# Patient Record
Sex: FEMALE | Race: BLACK OR AFRICAN AMERICAN | NOT HISPANIC OR LATINO | Employment: OTHER | URBAN - METROPOLITAN AREA
[De-identification: names, ages, dates, MRNs, and addresses within clinical notes are randomized per-mention and may not be internally consistent; named-entity substitution may affect disease eponyms.]

---

## 2023-10-01 ENCOUNTER — APPOINTMENT (OUTPATIENT)
Dept: RADIOLOGY | Facility: HOSPITAL | Age: 58
DRG: 097 | End: 2023-10-01
Payer: COMMERCIAL

## 2023-10-01 ENCOUNTER — HOSPITAL ENCOUNTER (INPATIENT)
Facility: HOSPITAL | Age: 58
LOS: 6 days | Discharge: HOME | DRG: 097 | End: 2023-10-07
Attending: STUDENT IN AN ORGANIZED HEALTH CARE EDUCATION/TRAINING PROGRAM | Admitting: STUDENT IN AN ORGANIZED HEALTH CARE EDUCATION/TRAINING PROGRAM
Payer: COMMERCIAL

## 2023-10-01 DIAGNOSIS — E13.11: Primary | ICD-10-CM

## 2023-10-01 DIAGNOSIS — G93.40 ENCEPHALOPATHY ACUTE: ICD-10-CM

## 2023-10-01 DIAGNOSIS — R53.81 PHYSICAL DECONDITIONING: ICD-10-CM

## 2023-10-01 DIAGNOSIS — I10 HYPERTENSION, UNSPECIFIED TYPE: ICD-10-CM

## 2023-10-01 DIAGNOSIS — R56.9 CONVULSIONS, UNSPECIFIED CONVULSION TYPE (MULTI): ICD-10-CM

## 2023-10-01 DIAGNOSIS — R41.0 DELIRIUM: ICD-10-CM

## 2023-10-01 DIAGNOSIS — E11.9 TYPE 2 DIABETES MELLITUS WITHOUT COMPLICATION, WITH LONG-TERM CURRENT USE OF INSULIN (MULTI): ICD-10-CM

## 2023-10-01 DIAGNOSIS — Z79.4 TYPE 2 DIABETES MELLITUS WITHOUT COMPLICATION, WITH LONG-TERM CURRENT USE OF INSULIN (MULTI): ICD-10-CM

## 2023-10-01 LAB
ABO GROUP (TYPE) IN BLOOD: NORMAL
AMPHETAMINES UR QL SCN: ABNORMAL
ANION GAP BLDV CALCULATED.4IONS-SCNC: 12 MMOL/L (ref 10–25)
ANION GAP BLDV CALCULATED.4IONS-SCNC: 18 MMOL/L (ref 10–25)
ANION GAP BLDV CALCULATED.4IONS-SCNC: 24 MMOL/L (ref 10–25)
ANION GAP BLDV CALCULATED.4IONS-SCNC: 30 MMOL/L (ref 10–25)
ANTIBODY SCREEN: NORMAL
APAP SERPL-MCNC: <10 UG/ML
APPEARANCE UR: CLEAR
B-OH-BUTYR SERPL-SCNC: 2.71 MMOL/L (ref 0.02–0.27)
BARBITURATES UR QL SCN: ABNORMAL
BASE EXCESS BLDV CALC-SCNC: -0.1 MMOL/L (ref -2–3)
BASE EXCESS BLDV CALC-SCNC: -10 MMOL/L (ref -2–3)
BASE EXCESS BLDV CALC-SCNC: -2.3 MMOL/L (ref -2–3)
BASE EXCESS BLDV CALC-SCNC: 2.3 MMOL/L (ref -2–3)
BASOPHILS # BLD AUTO: 0.01 X10*3/UL (ref 0–0.1)
BASOPHILS NFR BLD AUTO: 0.1 %
BENZODIAZ UR QL SCN: ABNORMAL
BILIRUB UR STRIP.AUTO-MCNC: NEGATIVE MG/DL
BODY TEMPERATURE: 37 DEGREES CELSIUS
BZE UR QL SCN: ABNORMAL
CA-I BLD-SCNC: 0.51 MMOL/L (ref 1.1–1.33)
CA-I BLDV-SCNC: 0.52 MMOL/L (ref 1.1–1.33)
CA-I BLDV-SCNC: 1.15 MMOL/L (ref 1.1–1.33)
CA-I BLDV-SCNC: 1.19 MMOL/L (ref 1.1–1.33)
CA-I BLDV-SCNC: 1.2 MMOL/L (ref 1.1–1.33)
CANNABINOIDS UR QL SCN: ABNORMAL
CARDIAC TROPONIN I PNL SERPL HS: 31 NG/L (ref 0–34)
CHLORIDE BLDV-SCNC: 100 MMOL/L (ref 98–107)
CHLORIDE BLDV-SCNC: 82 MMOL/L (ref 98–107)
CHLORIDE BLDV-SCNC: 95 MMOL/L (ref 98–107)
CHLORIDE BLDV-SCNC: 96 MMOL/L (ref 98–107)
CK SERPL-CCNC: 998 U/L (ref 0–215)
COLOR UR: ABNORMAL
EOSINOPHIL # BLD AUTO: 0 X10*3/UL (ref 0–0.7)
EOSINOPHIL NFR BLD AUTO: 0 %
ERYTHROCYTE [DISTWIDTH] IN BLOOD BY AUTOMATED COUNT: 11.9 % (ref 11.5–14.5)
ETHANOL SERPL-MCNC: <10 MG/DL
FENTANYL+NORFENTANYL UR QL SCN: ABNORMAL
FIBRINOGEN PPP-MCNC: 430 MG/DL (ref 200–400)
GLUCOSE BLD MANUAL STRIP-MCNC: 271 MG/DL (ref 74–99)
GLUCOSE BLD MANUAL STRIP-MCNC: 311 MG/DL (ref 74–99)
GLUCOSE BLD MANUAL STRIP-MCNC: 320 MG/DL (ref 74–99)
GLUCOSE BLD MANUAL STRIP-MCNC: 327 MG/DL (ref 74–99)
GLUCOSE BLD MANUAL STRIP-MCNC: 377 MG/DL (ref 74–99)
GLUCOSE BLDV-MCNC: 290 MG/DL (ref 74–99)
GLUCOSE BLDV-MCNC: 400 MG/DL (ref 74–99)
GLUCOSE BLDV-MCNC: 490 MG/DL (ref 74–99)
GLUCOSE BLDV-MCNC: 673 MG/DL (ref 74–99)
GLUCOSE UR STRIP.AUTO-MCNC: ABNORMAL MG/DL
HCO3 BLDV-SCNC: 19 MMOL/L (ref 22–26)
HCO3 BLDV-SCNC: 21.1 MMOL/L (ref 22–26)
HCO3 BLDV-SCNC: 23.9 MMOL/L (ref 22–26)
HCO3 BLDV-SCNC: 25.3 MMOL/L (ref 22–26)
HCT VFR BLD AUTO: 42.5 % (ref 36–46)
HCT VFR BLD EST: 35 % (ref 36–46)
HCT VFR BLD EST: 47 % (ref 36–46)
HCT VFR BLD EST: 52 % (ref 36–46)
HCT VFR BLD EST: ABNORMAL %
HGB BLD-MCNC: 14.9 G/DL (ref 12–16)
HGB BLDV-MCNC: 11.8 G/DL (ref 12–16)
HGB BLDV-MCNC: 15.8 G/DL (ref 12–16)
HGB BLDV-MCNC: 17.3 G/DL (ref 12–16)
HGB BLDV-MCNC: ABNORMAL G/DL
IMM GRANULOCYTES # BLD AUTO: 0.15 X10*3/UL (ref 0–0.7)
IMM GRANULOCYTES NFR BLD AUTO: 1 % (ref 0–0.9)
INR PPP: 1.1 (ref 0.9–1.1)
KETONES UR STRIP.AUTO-MCNC: ABNORMAL MG/DL
LACTATE BLDV-SCNC: 13.3 MMOL/L (ref 0.4–2)
LACTATE BLDV-SCNC: 2.2 MMOL/L (ref 0.4–2)
LACTATE BLDV-SCNC: 4.4 MMOL/L (ref 0.4–2)
LACTATE BLDV-SCNC: 7.7 MMOL/L (ref 0.4–2)
LACTATE SERPL-SCNC: 16.4 MMOL/L (ref 0.4–2)
LACTATE SERPL-SCNC: 2.1 MMOL/L (ref 0.4–2)
LACTATE SERPL-SCNC: 6 MMOL/L (ref 0.4–2)
LEUKOCYTE ESTERASE UR QL STRIP.AUTO: NEGATIVE
LIPASE SERPL-CCNC: 40 U/L (ref 9–82)
LYMPHOCYTES # BLD AUTO: 2.04 X10*3/UL (ref 1.2–4.8)
LYMPHOCYTES NFR BLD AUTO: 14 %
MAGNESIUM SERPL-MCNC: 1.63 MG/DL (ref 1.6–2.4)
MCH RBC QN AUTO: 31.8 PG (ref 26–34)
MCHC RBC AUTO-ENTMCNC: 35.1 G/DL (ref 32–36)
MCV RBC AUTO: 91 FL (ref 80–100)
MONOCYTES # BLD AUTO: 0.91 X10*3/UL (ref 0.1–1)
MONOCYTES NFR BLD AUTO: 6.2 %
NEUTROPHILS # BLD AUTO: 11.47 X10*3/UL (ref 1.2–7.7)
NEUTROPHILS NFR BLD AUTO: 78.7 %
NITRITE UR QL STRIP.AUTO: NEGATIVE
NRBC BLD-RTO: 0 /100 WBCS (ref 0–0)
OPIATES UR QL SCN: ABNORMAL
OSMOLALITY SERPL: 304 MOSM/KG (ref 280–300)
OXYCODONE+OXYMORPHONE UR QL SCN: ABNORMAL
OXYHGB MFR BLDV: 68.8 % (ref 45–75)
OXYHGB MFR BLDV: 90 % (ref 45–75)
OXYHGB MFR BLDV: 95.2 % (ref 45–75)
OXYHGB MFR BLDV: ABNORMAL %
PCO2 BLDV: 31 MM HG (ref 41–51)
PCO2 BLDV: 34 MM HG (ref 41–51)
PCO2 BLDV: 36 MM HG (ref 41–51)
PCO2 BLDV: 52 MM HG (ref 41–51)
PCP UR QL SCN: ABNORMAL
PH BLDV: 7.17 PH (ref 7.33–7.43)
PH BLDV: 7.43 PH (ref 7.33–7.43)
PH BLDV: 7.44 PH (ref 7.33–7.43)
PH BLDV: 7.48 PH (ref 7.33–7.43)
PH UR STRIP.AUTO: 6 [PH]
PLATELET # BLD AUTO: 265 X10*3/UL (ref 150–450)
PMV BLD AUTO: 11.6 FL (ref 7.5–11.5)
PO2 BLDV: 50 MM HG (ref 35–45)
PO2 BLDV: 54 MM HG (ref 35–45)
PO2 BLDV: 63 MM HG (ref 35–45)
PO2 BLDV: 79 MM HG (ref 35–45)
POTASSIUM BLDV-SCNC: 2.2 MMOL/L (ref 3.5–5.3)
POTASSIUM BLDV-SCNC: 3 MMOL/L (ref 3.5–5.3)
POTASSIUM BLDV-SCNC: 3.3 MMOL/L (ref 3.5–5.3)
POTASSIUM BLDV-SCNC: 6.1 MMOL/L (ref 3.5–5.3)
PROT UR STRIP.AUTO-MCNC: ABNORMAL MG/DL
PROTHROMBIN TIME: 12.6 SECONDS (ref 9.8–12.8)
RBC # BLD AUTO: 4.68 X10*6/UL (ref 4–5.2)
RBC # UR STRIP.AUTO: ABNORMAL /UL
RBC #/AREA URNS AUTO: NORMAL /HPF
RH FACTOR (ANTIGEN D): NORMAL
SALICYLATES SERPL-MCNC: <3 MG/DL
SAO2 % BLDV: 71 % (ref 45–75)
SAO2 % BLDV: 93 % (ref 45–75)
SAO2 % BLDV: 98 % (ref 45–75)
SAO2 % BLDV: ABNORMAL %
SODIUM BLDV-SCNC: 131 MMOL/L (ref 136–145)
SODIUM BLDV-SCNC: 132 MMOL/L (ref 136–145)
SODIUM BLDV-SCNC: 134 MMOL/L (ref 136–145)
SODIUM BLDV-SCNC: 135 MMOL/L (ref 136–145)
SP GR UR STRIP.AUTO: 1.03
UROBILINOGEN UR STRIP.AUTO-MCNC: <2 MG/DL
WBC # BLD AUTO: 14.6 X10*3/UL (ref 4.4–11.3)
WBC #/AREA URNS AUTO: NORMAL /HPF

## 2023-10-01 PROCEDURE — 82550 ASSAY OF CK (CPK): CPT

## 2023-10-01 PROCEDURE — 2500000004 HC RX 250 GENERAL PHARMACY W/ HCPCS (ALT 636 FOR OP/ED)

## 2023-10-01 PROCEDURE — 84132 ASSAY OF SERUM POTASSIUM: CPT

## 2023-10-01 PROCEDURE — 80179 DRUG ASSAY SALICYLATE: CPT | Performed by: STUDENT IN AN ORGANIZED HEALTH CARE EDUCATION/TRAINING PROGRAM

## 2023-10-01 PROCEDURE — 83605 ASSAY OF LACTIC ACID: CPT | Performed by: SURGERY

## 2023-10-01 PROCEDURE — 82805 BLOOD GASES W/O2 SATURATION: CPT

## 2023-10-01 PROCEDURE — 80053 COMPREHEN METABOLIC PANEL: CPT

## 2023-10-01 PROCEDURE — 96365 THER/PROPH/DIAG IV INF INIT: CPT | Mod: 59

## 2023-10-01 PROCEDURE — 36415 COLL VENOUS BLD VENIPUNCTURE: CPT

## 2023-10-01 PROCEDURE — 72128 CT CHEST SPINE W/O DYE: CPT | Mod: FR,MG

## 2023-10-01 PROCEDURE — 36415 COLL VENOUS BLD VENIPUNCTURE: CPT | Performed by: SURGERY

## 2023-10-01 PROCEDURE — 99291 CRITICAL CARE FIRST HOUR: CPT

## 2023-10-01 PROCEDURE — 82947 ASSAY GLUCOSE BLOOD QUANT: CPT

## 2023-10-01 PROCEDURE — 96376 TX/PRO/DX INJ SAME DRUG ADON: CPT | Mod: 59

## 2023-10-01 PROCEDURE — 2500000004 HC RX 250 GENERAL PHARMACY W/ HCPCS (ALT 636 FOR OP/ED): Performed by: STUDENT IN AN ORGANIZED HEALTH CARE EDUCATION/TRAINING PROGRAM

## 2023-10-01 PROCEDURE — G1004 CDSM NDSC: HCPCS

## 2023-10-01 PROCEDURE — 99291 CRITICAL CARE FIRST HOUR: CPT | Performed by: STUDENT IN AN ORGANIZED HEALTH CARE EDUCATION/TRAINING PROGRAM

## 2023-10-01 PROCEDURE — 74018 RADEX ABDOMEN 1 VIEW: CPT | Mod: FY,FR

## 2023-10-01 PROCEDURE — 71045 X-RAY EXAM CHEST 1 VIEW: CPT | Mod: FY,FR

## 2023-10-01 PROCEDURE — 99285 EMERGENCY DEPT VISIT HI MDM: CPT | Performed by: STUDENT IN AN ORGANIZED HEALTH CARE EDUCATION/TRAINING PROGRAM

## 2023-10-01 PROCEDURE — 87040 BLOOD CULTURE FOR BACTERIA: CPT | Performed by: STUDENT IN AN ORGANIZED HEALTH CARE EDUCATION/TRAINING PROGRAM

## 2023-10-01 PROCEDURE — 81001 URINALYSIS AUTO W/SCOPE: CPT | Performed by: STUDENT IN AN ORGANIZED HEALTH CARE EDUCATION/TRAINING PROGRAM

## 2023-10-01 PROCEDURE — 84132 ASSAY OF SERUM POTASSIUM: CPT | Performed by: SURGERY

## 2023-10-01 PROCEDURE — 96375 TX/PRO/DX INJ NEW DRUG ADDON: CPT | Mod: 59

## 2023-10-01 PROCEDURE — 82947 ASSAY GLUCOSE BLOOD QUANT: CPT | Performed by: SURGERY

## 2023-10-01 PROCEDURE — 96366 THER/PROPH/DIAG IV INF ADDON: CPT | Mod: 59

## 2023-10-01 PROCEDURE — 82077 ASSAY SPEC XCP UR&BREATH IA: CPT | Performed by: SURGERY

## 2023-10-01 PROCEDURE — 80307 DRUG TEST PRSMV CHEM ANLYZR: CPT | Performed by: STUDENT IN AN ORGANIZED HEALTH CARE EDUCATION/TRAINING PROGRAM

## 2023-10-01 PROCEDURE — 86901 BLOOD TYPING SEROLOGIC RH(D): CPT | Performed by: SURGERY

## 2023-10-01 PROCEDURE — 85025 COMPLETE CBC W/AUTO DIFF WBC: CPT | Performed by: SURGERY

## 2023-10-01 PROCEDURE — G0390 TRAUMA RESPONS W/HOSP CRITI: HCPCS | Performed by: STUDENT IN AN ORGANIZED HEALTH CARE EDUCATION/TRAINING PROGRAM

## 2023-10-01 PROCEDURE — 36415 COLL VENOUS BLD VENIPUNCTURE: CPT | Performed by: STUDENT IN AN ORGANIZED HEALTH CARE EDUCATION/TRAINING PROGRAM

## 2023-10-01 PROCEDURE — 96368 THER/DIAG CONCURRENT INF: CPT | Mod: 59

## 2023-10-01 PROCEDURE — 87389 HIV-1 AG W/HIV-1&-2 AB AG IA: CPT

## 2023-10-01 PROCEDURE — 80143 DRUG ASSAY ACETAMINOPHEN: CPT | Performed by: STUDENT IN AN ORGANIZED HEALTH CARE EDUCATION/TRAINING PROGRAM

## 2023-10-01 PROCEDURE — 83735 ASSAY OF MAGNESIUM: CPT

## 2023-10-01 PROCEDURE — 31500 INSERT EMERGENCY AIRWAY: CPT | Performed by: STUDENT IN AN ORGANIZED HEALTH CARE EDUCATION/TRAINING PROGRAM

## 2023-10-01 PROCEDURE — 2020000001 HC ICU ROOM DAILY

## 2023-10-01 PROCEDURE — 82435 ASSAY OF BLOOD CHLORIDE: CPT

## 2023-10-01 PROCEDURE — 72170 X-RAY EXAM OF PELVIS: CPT | Mod: FY,FR

## 2023-10-01 PROCEDURE — 85610 PROTHROMBIN TIME: CPT | Performed by: SURGERY

## 2023-10-01 PROCEDURE — 85384 FIBRINOGEN ACTIVITY: CPT | Performed by: SURGERY

## 2023-10-01 PROCEDURE — 83930 ASSAY OF BLOOD OSMOLALITY: CPT

## 2023-10-01 PROCEDURE — 72125 CT NECK SPINE W/O DYE: CPT | Mod: MG

## 2023-10-01 PROCEDURE — 83036 HEMOGLOBIN GLYCOSYLATED A1C: CPT

## 2023-10-01 PROCEDURE — 70450 CT HEAD/BRAIN W/O DYE: CPT

## 2023-10-01 PROCEDURE — 83605 ASSAY OF LACTIC ACID: CPT | Performed by: STUDENT IN AN ORGANIZED HEALTH CARE EDUCATION/TRAINING PROGRAM

## 2023-10-01 PROCEDURE — 2500000005 HC RX 250 GENERAL PHARMACY W/O HCPCS: Performed by: STUDENT IN AN ORGANIZED HEALTH CARE EDUCATION/TRAINING PROGRAM

## 2023-10-01 PROCEDURE — 87075 CULTR BACTERIA EXCEPT BLOOD: CPT | Performed by: STUDENT IN AN ORGANIZED HEALTH CARE EDUCATION/TRAINING PROGRAM

## 2023-10-01 PROCEDURE — 94002 VENT MGMT INPAT INIT DAY: CPT

## 2023-10-01 PROCEDURE — 84295 ASSAY OF SERUM SODIUM: CPT | Performed by: SURGERY

## 2023-10-01 PROCEDURE — 87640 STAPH A DNA AMP PROBE: CPT | Performed by: HOSPITALIST

## 2023-10-01 PROCEDURE — 94762 N-INVAS EAR/PLS OXIMTRY CONT: CPT

## 2023-10-01 PROCEDURE — 2500000001 HC RX 250 WO HCPCS SELF ADMINISTERED DRUGS (ALT 637 FOR MEDICARE OP)

## 2023-10-01 PROCEDURE — 2580000001 HC RX 258 IV SOLUTIONS

## 2023-10-01 PROCEDURE — 84484 ASSAY OF TROPONIN QUANT: CPT | Performed by: SURGERY

## 2023-10-01 PROCEDURE — 83690 ASSAY OF LIPASE: CPT

## 2023-10-01 PROCEDURE — 82010 KETONE BODYS QUAN: CPT

## 2023-10-01 PROCEDURE — 82330 ASSAY OF CALCIUM: CPT | Performed by: STUDENT IN AN ORGANIZED HEALTH CARE EDUCATION/TRAINING PROGRAM

## 2023-10-01 PROCEDURE — 2550000001 HC RX 255 CONTRASTS: Performed by: STUDENT IN AN ORGANIZED HEALTH CARE EDUCATION/TRAINING PROGRAM

## 2023-10-01 PROCEDURE — 70498 CT ANGIOGRAPHY NECK: CPT | Mod: MG

## 2023-10-01 PROCEDURE — 5A1945Z RESPIRATORY VENTILATION, 24-96 CONSECUTIVE HOURS: ICD-10-PCS | Performed by: INTERNAL MEDICINE

## 2023-10-01 RX ORDER — PROPOFOL 10 MG/ML
INJECTION, EMULSION INTRAVENOUS
Status: COMPLETED | OUTPATIENT
Start: 2023-10-01 | End: 2023-10-01

## 2023-10-01 RX ORDER — ACETAMINOPHEN 325 MG/1
650 TABLET ORAL EVERY 4 HOURS PRN
Status: DISCONTINUED | OUTPATIENT
Start: 2023-10-01 | End: 2023-10-02

## 2023-10-01 RX ORDER — FOLIC ACID 1 MG/1
1 TABLET ORAL DAILY
Status: DISCONTINUED | OUTPATIENT
Start: 2023-10-01 | End: 2023-10-07 | Stop reason: HOSPADM

## 2023-10-01 RX ORDER — FENTANYL CITRATE-0.9 % NACL/PF 10 MCG/ML
25-200 PLASTIC BAG, INJECTION (ML) INTRAVENOUS CONTINUOUS
Status: DISCONTINUED | OUTPATIENT
Start: 2023-10-01 | End: 2023-10-03

## 2023-10-01 RX ORDER — POTASSIUM CHLORIDE 14.9 MG/ML
40 INJECTION INTRAVENOUS ONCE
Status: DISCONTINUED | OUTPATIENT
Start: 2023-10-01 | End: 2023-10-04

## 2023-10-01 RX ORDER — PROPOFOL 10 MG/ML
INJECTION, EMULSION INTRAVENOUS
Status: DISPENSED
Start: 2023-10-01 | End: 2023-10-01

## 2023-10-01 RX ORDER — NICARDIPINE HYDROCHLORIDE 0.2 MG/ML
2.5-15 INJECTION INTRAVENOUS CONTINUOUS
Status: DISCONTINUED | OUTPATIENT
Start: 2023-10-01 | End: 2023-10-02

## 2023-10-01 RX ORDER — ROCURONIUM BROMIDE 10 MG/ML
INJECTION, SOLUTION INTRAVENOUS
Status: DISPENSED
Start: 2023-10-01 | End: 2023-10-01

## 2023-10-01 RX ORDER — ETOMIDATE 2 MG/ML
INJECTION INTRAVENOUS
Status: DISPENSED
Start: 2023-10-01 | End: 2023-10-01

## 2023-10-01 RX ORDER — LABETALOL HYDROCHLORIDE 5 MG/ML
INJECTION, SOLUTION INTRAVENOUS
Status: DISPENSED
Start: 2023-10-01 | End: 2023-10-01

## 2023-10-01 RX ORDER — MULTIVIT-MIN/IRON FUM/FOLIC AC 7.5 MG-4
1 TABLET ORAL DAILY
Status: DISCONTINUED | OUTPATIENT
Start: 2023-10-01 | End: 2023-10-07 | Stop reason: HOSPADM

## 2023-10-01 RX ORDER — POTASSIUM CHLORIDE 14.9 MG/ML
20 INJECTION INTRAVENOUS
Status: COMPLETED | OUTPATIENT
Start: 2023-10-01 | End: 2023-10-01

## 2023-10-01 RX ORDER — ETOMIDATE 2 MG/ML
INJECTION INTRAVENOUS
Status: COMPLETED | OUTPATIENT
Start: 2023-10-01 | End: 2023-10-01

## 2023-10-01 RX ORDER — ROCURONIUM BROMIDE 10 MG/ML
INJECTION, SOLUTION INTRAVENOUS
Status: COMPLETED | OUTPATIENT
Start: 2023-10-01 | End: 2023-10-01

## 2023-10-01 RX ORDER — HYDRALAZINE HYDROCHLORIDE 20 MG/ML
10 INJECTION INTRAMUSCULAR; INTRAVENOUS ONCE
Status: DISCONTINUED | OUTPATIENT
Start: 2023-10-01 | End: 2023-10-01

## 2023-10-01 RX ORDER — VANCOMYCIN HYDROCHLORIDE 1 G/200ML
2 INJECTION, SOLUTION INTRAVENOUS ONCE
Status: COMPLETED | OUTPATIENT
Start: 2023-10-01 | End: 2023-10-01

## 2023-10-01 RX ORDER — POTASSIUM CHLORIDE 14.9 MG/ML
20 INJECTION INTRAVENOUS
Status: COMPLETED | OUTPATIENT
Start: 2023-10-01 | End: 2023-10-02

## 2023-10-01 RX ORDER — PROPOFOL 10 MG/ML
INJECTION, EMULSION INTRAVENOUS
Status: COMPLETED
Start: 2023-10-01 | End: 2023-10-01

## 2023-10-01 RX ORDER — LABETALOL HYDROCHLORIDE 5 MG/ML
10 INJECTION, SOLUTION INTRAVENOUS ONCE
Status: DISCONTINUED | OUTPATIENT
Start: 2023-10-01 | End: 2023-10-01

## 2023-10-01 RX ORDER — PROPOFOL 10 MG/ML
5-50 INJECTION, EMULSION INTRAVENOUS CONTINUOUS
Status: DISCONTINUED | OUTPATIENT
Start: 2023-10-01 | End: 2023-10-02

## 2023-10-01 RX ADMIN — PROPOFOL 50 MCG/KG/MIN: 10 INJECTION, EMULSION INTRAVENOUS at 20:13

## 2023-10-01 RX ADMIN — PROPOFOL 50 MCG/KG/MIN: 10 INJECTION, EMULSION INTRAVENOUS at 17:08

## 2023-10-01 RX ADMIN — TAZOBACTAM SODIUM AND PIPERACILLIN SODIUM 4.5 G: 500; 4 INJECTION, SOLUTION INTRAVENOUS at 12:50

## 2023-10-01 RX ADMIN — PIPERACILLIN SODIUM AND TAZOBACTAM SODIUM 4.5 G: 4; .5 INJECTION, SOLUTION INTRAVENOUS at 23:25

## 2023-10-01 RX ADMIN — SODIUM CHLORIDE, POTASSIUM CHLORIDE, SODIUM LACTATE AND CALCIUM CHLORIDE 1000 ML: 600; 310; 30; 20 INJECTION, SOLUTION INTRAVENOUS at 22:23

## 2023-10-01 RX ADMIN — Medication 50 %: at 18:42

## 2023-10-01 RX ADMIN — ROCURONIUM BROMIDE 100 MG: 10 INJECTION, SOLUTION INTRAVENOUS at 10:34

## 2023-10-01 RX ADMIN — ACETAMINOPHEN 650 MG: 325 TABLET, FILM COATED ORAL at 22:38

## 2023-10-01 RX ADMIN — PROPOFOL 10 MCG/KG/MIN: 10 INJECTION, EMULSION INTRAVENOUS at 10:41

## 2023-10-01 RX ADMIN — NICARDIPINE HYDROCHLORIDE 5 MG/HR: 0.2 INJECTION, SOLUTION INTRAVENOUS at 12:00

## 2023-10-01 RX ADMIN — Medication 80 %: at 10:35

## 2023-10-01 RX ADMIN — POTASSIUM CHLORIDE 20 MEQ: 14.9 INJECTION, SOLUTION INTRAVENOUS at 14:50

## 2023-10-01 RX ADMIN — POTASSIUM CHLORIDE 20 MEQ: 14.9 INJECTION, SOLUTION INTRAVENOUS at 22:23

## 2023-10-01 RX ADMIN — ETOMIDATE 12 MG: 2 INJECTION INTRAVENOUS at 10:34

## 2023-10-01 RX ADMIN — INSULIN HUMAN 8 UNITS/HR: 1 INJECTION, SOLUTION INTRAVENOUS at 10:50

## 2023-10-01 RX ADMIN — SODIUM CHLORIDE 1000 ML: 0.9 INJECTION, SOLUTION INTRAVENOUS at 10:37

## 2023-10-01 RX ADMIN — INSULIN HUMAN 100 UNITS: 1 INJECTION, SOLUTION INTRAVENOUS at 22:24

## 2023-10-01 RX ADMIN — IOHEXOL 130 ML: 350 INJECTION, SOLUTION INTRAVENOUS at 11:23

## 2023-10-01 RX ADMIN — VANCOMYCIN HYDROCHLORIDE 2 G: 1 INJECTION, SOLUTION INTRAVENOUS at 12:50

## 2023-10-01 RX ADMIN — POTASSIUM CHLORIDE 20 MEQ: 14.9 INJECTION, SOLUTION INTRAVENOUS at 12:50

## 2023-10-01 RX ADMIN — Medication 25 MCG/HR: at 13:55

## 2023-10-01 RX ADMIN — THIAMINE HYDROCHLORIDE 500 MG: 100 INJECTION, SOLUTION INTRAMUSCULAR; INTRAVENOUS at 22:37

## 2023-10-01 ASSESSMENT — LIFESTYLE VARIABLES
TREMOR: NO TREMOR
ANXIETY: NO ANXIETY, AT EASE
NAUSEA AND VOMITING: NO NAUSEA AND NO VOMITING
AGITATION: NORMAL ACTIVITY
AUDITORY DISTURBANCES: NOT PRESENT
TOTAL SCORE: 0
PAROXYSMAL SWEATS: NO SWEAT VISIBLE
HEADACHE, FULLNESS IN HEAD: NOT PRESENT
ORIENTATION AND CLOUDING OF SENSORIUM: ORIENTED AND CAN DO SERIAL ADDITIONS
BLOOD PRESSURE: 119/73
PULSE: 107
VISUAL DISTURBANCES: NOT PRESENT

## 2023-10-01 ASSESSMENT — PAIN - FUNCTIONAL ASSESSMENT
PAIN_FUNCTIONAL_ASSESSMENT: CPOT (CRITICAL CARE PAIN OBSERVATION TOOL)
PAIN_FUNCTIONAL_ASSESSMENT: WONG-BAKER FACES

## 2023-10-01 ASSESSMENT — PAIN SCALES - WONG BAKER: WONGBAKER_NUMERICALRESPONSE: NO HURT

## 2023-10-01 ASSESSMENT — COLUMBIA-SUICIDE SEVERITY RATING SCALE - C-SSRS
2. HAVE YOU ACTUALLY HAD ANY THOUGHTS OF KILLING YOURSELF?: NO
1. IN THE PAST MONTH, HAVE YOU WISHED YOU WERE DEAD OR WISHED YOU COULD GO TO SLEEP AND NOT WAKE UP?: NO
6. HAVE YOU EVER DONE ANYTHING, STARTED TO DO ANYTHING, OR PREPARED TO DO ANYTHING TO END YOUR LIFE?: NO

## 2023-10-01 ASSESSMENT — PAIN DESCRIPTION - PROGRESSION: CLINICAL_PROGRESSION: NOT CHANGED

## 2023-10-01 NOTE — ED NOTES
Patient starting to become more coherent so resident hali made aware that propofol needs restarted and that RN restarted propofol gtt.     Jessica Delgado RN  10/01/23 9663

## 2023-10-01 NOTE — ED NOTES
Per the patient's family, patient is a very heavy daily drinker. Unsure of how much. But her brother just passed and has not been dealing with it well and has been drinking more prior to this admission. Attending and resident made aware. Patient placed on ciwa.     Jessica Delgado RN  10/01/23 0574

## 2023-10-01 NOTE — ED NOTES
Soft restraints on wrists initiated due to patient becoming very agitated and propofol increased to 25mcg     Jessica Delgado RN  10/01/23 7982

## 2023-10-01 NOTE — Clinical Note
Is the patient on isolation?: No  Do you expect the patient to require telemetry (informational-only for bed management): Yes

## 2023-10-01 NOTE — ED NOTES
Cardene also since paused.Due to hypotension patient's fentanyl and propofol stopped per verbal order from attending vipin. Patient's potassium on vbg 2.2 so insulin paused. Attending jluis and resident hali aware.     Jessica Delgado RN  10/01/23 1452       Jessica Delgado RN  10/01/23 1503       Jessica Delgado RN  10/01/23 0050

## 2023-10-01 NOTE — ED NOTES
"Pt presents to ED as a full trauma activation. Per EMS report, pt was found by her mother at the foot of the stairs. EMS states pt had a seizure en route and was medicated w/ 5 mg Versed. POCT BG for EMS was initially unreadable as \"high\", then read as 563 PTA.    Pt noted to have some posturing by EMS. On exam, pt noted to have some decorticate posturing.      Daniela Regalado RN  10/01/23 1108    "

## 2023-10-01 NOTE — H&P
[facility]  TRAUMA SERVICE - HISTORY AND PHYSICAL / CONSULT    Patient Name: Magda Bey  MRN: [id number]  Admit Date: 1001  : 10/1/1970  AGE: 53 y.o.   GENDER: female  ==============================================================================  MECHANISM OF INJURY / CHIEF COMPLAINT:   59yo F (Valentina Fontanez  1965) with unknown PMH found down with GCS 5, protecting airway per EMS, arrived on mask ventilation. Was given versed in the field.     In bay, was found to be acidotic with pH 7.17 and elevated glucose to 600's with concern for DKA. Was intubated in the bay. CXR and PXR normal. Started on insulin drip and propofol drip.     LOC (yes/no?): yes  Anticoagulant / Anti-platelet Rx? (for what dx?): unknown  Referring Facility Name (N/A for scene EMR run): n/a    INJURIES:   No acute traumatic injuries apparent on imaging     OTHER MEDICAL PROBLEMS:  Unknown     INCIDENTAL FINDINGS:  hepatic steatosis, fibroid uterus     ==============================================================================  ADMISSION PLAN OF CARE:  Valentina Fontanez is a 58 year old female who presented after being found down. Imaging revealed no traumatic injuries at this time based on current imaging.   -Likely medicine admission for DKA  -No acute trauma surgical intervention required, please call with any further questions or concerns   -Consultants notified (specialty, provider name, time): none currently     Seen and discussed with attending Dr. Todd Layton MD   PGY-1 General Surgery  Trauma Surgery [id number]  ==============================================================================  PAST MEDICAL HISTORY:   PMH: Unknown  No past medical history on file.  Last menstrual period: Unknown     PSH: unknown  No past surgical history on file.  FH: unknown  No family history on file.  SOCIAL HISTORY:    Smoking: unknown    Social History     Tobacco Use   Smoking Status Not on  file   Smokeless Tobacco Not on file       Alcohol: Unknown    Social History     Substance and Sexual Activity   Alcohol Use Not on file       Drug use: unknown    MEDICATIONS: unknown  Prior to Admission medications    Not on File     ALLERGIES: unknown  Not on File    REVIEW OF SYSTEMS:  Review of Systems  PHYSICAL EXAM:  PRIMARY SURVEY:  Airway  Airway is compromised.     Breathing  Breathing is normal. Right breath sounds are normal. Left breath sounds are normal.   Interventions:  Intubation performed by ED Staff   Circulation  Cardiac rhythm is regular.   Pulses  Radial: 2+ on the right; on the left.  Femoral: 2+ on the right; on the left.  Pedal: 2+ on the right; on the left.    Disability  [place] Coma Score  Eye:1   Verbal:1T   Motor:3      5T  Pupils  Right Pupil:   round and reactive        Left Pupil:   round and reactive                 Exam - eFAST  No fluid in the pericardial window    No fluid in the abdomen right upper quadrant, abdomen left upper quadrant, pelvis, right lung and left lung.       SECONDARY SURVEY/PHYSICAL EXAM:  Secondary Survey:  NEURO: Unconcious, GCS 3, Non purposeful extremity movement, no eye opening, no verbal   HEAD: NC/AT, No lacerations or abrasions, no bony step offs, midface stable.  EENT: PERRL, EOMI. Pupils 4-2mm b/l. external ear without laceration. Nasal septum midline, no crepitus or septal hematoma. Oral mucosa and tongue without lacerations, teeth in place.   NECK: No cervical spine step offs, no lacerations or abrasions, trachea midline. No JVD.  RESPIRATORY/CHEST: No abrasions, contusions, crepitus or tenderness to palpation. Non-labored, equal chest expansion,  CV: RRR, Pulses bilateral: 2+ radial, 2+DP, 2+PT,  2+femoral. No TTP of chest  ABDOMEN: soft, nondistended. No scars, abrasions or lacerations.  PELVIS: Stable to compression.  : nml external genitalia, no blood at urethral meatus  BACK/SPINE: No thoracic midline step-offs or deformities. No lumbar  midline step-offs, or deformities.  No abrasions, hematomas or lacerations noted.  EXTREMITIES: No edema or cyanosis, No deformities, lacerations or contusions.    IMAGING SUMMARY:  (summary of findings, not a copy of dictation)  CT Head/Face: No evidence of infarct or ICH. No evidence of stenosis or large branch vessel cutoffs.   CT C-Spine: No traumatic spondylolisthesis of C-spine   CT Chest/Abd/Pelvis: Bibasilar pulmonary infiltrates, hepatic steatosis, fibroid uterus, otherwise unremarkable CT CAP/ CT T/L spines   CXR/PXR: No focal pulmonary consolidation or pleural effusions. No acute pelvic fx  Other(s): pending reads     LABS:  Results for orders placed or performed during the hospital encounter of 10/01/23 (from the past 24 hour(s))   Blood Gas Venous Full Panel Unsolicited   Result Value Ref Range    POCT pH, Venous 7.17 (LL) 7.33 - 7.43 pH    POCT pCO2, Venous 52 (H) 41 - 51 mm Hg    POCT pO2, Venous 50 (H) 35 - 45 mm Hg    POCT SO2, Venous 71 45 - 75 %    POCT Oxy Hemoglobin, Venous 68.8 45.0 - 75.0 %    POCT Hematocrit Calculated, Venous 52.0 (H) 36.0 - 46.0 %    POCT Sodium, Venous 132 (L) 136 - 145 mmol/L    POCT Potassium, Venous 6.1 (HH) 3.5 - 5.3 mmol/L    POCT Chloride, Venous 95 (L) 98 - 107 mmol/L    POCT Ionized Calicum, Venous 1.20 1.10 - 1.33 mmol/L    POCT Glucose, Venous 673 (HH) 74 - 99 mg/dL    POCT Lactate, Venous 13.3 (HH) 0.4 - 2.0 mmol/L    POCT Base Excess, Venous -10.0 (L) -2.0 - 3.0 mmol/L    POCT HCO3 Calculated, Venous 19.0 (L) 22.0 - 26.0 mmol/L    POCT Hemoglobin, Venous 17.3 (H) 12.0 - 16.0 g/dL    POCT Anion Gap, Venous 24.0 10.0 - 25.0 mmol/L    Patient Temperature 37.0 degrees Celsius       I have reviewed all laboratory and imaging results ordered/pertinent for this encounter.

## 2023-10-01 NOTE — ED NOTES
Patient and family updated on plan of care by RN and Attending.     Jessica Delgado RN  10/01/23 3442

## 2023-10-01 NOTE — NURSING NOTE
Sotero Two admitted to the MICU from ED for DKA TX. PT arrived to MICU via RN transport. PT arrived intubated. PT immediately placed on MICU continuous cardiac and SpO2 monitoring. Face to face handoff performed at bedside with RN and the ICU team. Please refer to documentation flowsheets for a complete assessment and vital signs. Admission orders reviewed. Family is not present at this time.

## 2023-10-01 NOTE — ED PROVIDER NOTES
Limitations to History: None    HPI: Patient is a 53-year-old female past medical history significant for alcohol use disorder, diabetes presented emergency department chief concern of full trauma activation.  Patient reportedly had fall down 4-5 steps, was found to be seizing by EMS given IM Versed in route.  Upon arrival to the emergency department patient has a tenuous airway, bilateral breath sounds, 2+ pulses in the bilateral radial, femoral, DP pulses, GCS of 4.  Patient is in a cervical collar.  It is previously mentioned the patient is a tenuous airway.  Venous full panel was obtained to evaluate patient's acid-base status immediately prior to intubation.  Patient does have mixed respiratory and metabolic acidosis, significantly elevated glucose.  I am concerned the patient is in DKA which may be precipitating her trauma.  Due to patient's tenuous airway, GCS she was intubated to facilitate further imaging/work-up she was not protecting airway.  Patient was intubated in trauma bay (see procedures note).    Further collateral information is obtained from family.  Based on family's report it sounds like patient had a recent death of a relative.  Patient has been binge drinking however her exact history of alcohol use is uncertain.  Family notes the patient has been significantly down lately. They were updated on patient condition and plan of care.    Additional History Obtained from: EMS, chart review, family interview.    ------------------------------------------------------------------------------------------------------------------------------------------  Physical Exam:    NEURO: A&O x0, GCS 4, initially flexing to pain.  HEAD: NC/AT, No lacerations or abrasions, no bony step offs, midface stable.   EENT: PERRL, EOMI. Canals without blood or CSF drainage, TMs clear, external ear without laceration. Nasal septum midline, no crepitus or septal hematoma. Oral mucosa and tongue without lacerations, teeth in  place.   NECK: No cervical spine tenderness or step offs, no lacerations or abrasions, tracheal midline. No JVD.  RESPIRATORY/CHEST: No abrasions, contusions, crepitus or tenderness to palpation. Non-labored, equal chest expansion, CTAB, no W/R/R.  CV: Tachycardic, nml S1 and S2, no M/R/G. Pulses bilateral: 2+ radial, 2+DP, 2+PT,  2+femoral and 2+ carotid.   ABDOMEN: soft, nontender, nondistended. No scars, abrasions or lacerations.  PELVIS: Stable to compression  : nml external genitalia, no blood at urethral meatus  RECTAL: rectal tone intact with no gross blood noted on exam.  BACK/SPINE: No thoracic midline tenderness, step-offs or deformities. No lumbar midline tenderness, step-offs, or deformities.  No abrasions, hematomas or lacerations noted.   EXTREMITIES: No edema or cyanosis. Nml ROM w/o pain. No deformities, lacerations or contusions.     ------------------------------------------------------------------------------------------------------------------------------------------    Medical Decision Making:    Patient is a 53-year-old female with past medical history as noted above presenting to the emergency department obtunded after fall downstairs.  There is concern initially the patient may be in DKA.  Patient was started on normal saline, insulin, Cardene drip for hypertension.  The propofol drip was also initiated postintubation.  Intubation medications included etomidate 10 mg half dose due to altered mental status as well as rocuronium 100.  After the patient patient is taken for CT imaging.  Trauma primary and secondary survey unremarkable for any significant injury, CT imaging ultimately without any significant injury.  We will continue to treat patient's DKA.  We will also initiate work-up for potential sepsis with blood cultures.  No antibiotics indicated at this time.  Given patient's altered mental status, failure to protect airway, respiratory failure, likely DKA patient was discussed with  the ICU attending and accepted for admission in the ICU.  Patient did become agitated the emergency department required additional fentanyl drip.  During titration of sedatives/Cardene for blood pressure patient did have hypotensive episode.  Cardene was discontinued.  Venous blood gas was repeated, patient was noted to be hypokalemic and at this time insulin drip was discontinued.  Fluid resuscitation continued.  Patient was admitted to the medical ICU in critical condition for further treatment/care of DKA.  I discussed patient case with family, discussed patient case with trauma surgery, discussed patient case with medical ICU, multiple titratable drips were administered the patient will she was in the emergency department, all imaging was reviewed by emergency physician with comments as noted above.  Specifically I reviewed chest x-ray and pelvic x-ray that were performed at bedside with no significant fractures/dislocations, did note appropriate placement of endotracheal tube on initial chest x-ray.      Patient discussed with attending physician    Montana Ramos M.D.  PGY-3 EM       Montana Ramos MD  Resident  10/01/23 1417

## 2023-10-01 NOTE — ED NOTES
Fentanyl gtt restarted at 25mcg. Propofol still running. Cardene gtt still stopped. Vanc and zosyn completed. LR running and potassium gtt still running.      Jessica Delgado RN  10/01/23 3331

## 2023-10-01 NOTE — ED NOTES
Patient;s propofol now at 50mcg increased over the past 20 minutes as she has gotten very agitated. Placed patient's ankles in soft restraints as well. Resident hali made aware of this all.     Jesscia Delgado RN  10/01/23 2014

## 2023-10-01 NOTE — CARE PLAN
PT in bilateral soft wrist restraints to maintain patient safety and protect medical devices. Will continue to monitor and reassess the need for restraints. See restraint flow sheet for further documentation.     Fall prevention continued, bed wheels locked, side rails up x3, bed in lowest position, bed alarm on, room door open and lights on for ease of visibility.    Skin care plan continued, site care performed, minimal linens placed underneath and PT turned every two hours and PT checked for incontinence.

## 2023-10-01 NOTE — H&P
[facility]  TRAUMA SERVICE - HISTORY AND PHYSICAL / CONSULT    Patient Name: Magda Bey  MRN: [id number]  Admit Date: 1001  : 10/1/1970  AGE: 53 y.o.   GENDER: female  ==============================================================================  MECHANISM OF INJURY / CHIEF COMPLAINT:   57yo F (Valentina Fontanez  1965) with unknown PMH found down with GCS 5, protecting airway per EMS, arrived on mask ventilation. Was given versed in the field.     In bay, was found to be acidotic with pH 7.17 and elevated glucose to 600's with concern for DKA. Was intubated in the bay. CXR and PXR normal. Started on insulin drip and propofol drip.     LOC (yes/no?): yes  Anticoagulant / Anti-platelet Rx? (for what dx?): unknown  Referring Facility Name (N/A for scene EMR run): n/a    INJURIES:   No acute traumatic injuries apparent on imaging     OTHER MEDICAL PROBLEMS:  Unknown     INCIDENTAL FINDINGS:  hepatic steatosis, fibroid uterus     ==============================================================================  ADMISSION PLAN OF CARE:  Valentina Fontanez is a 58 year old female who presented after being found down. Imaging revealed no traumatic injuries at this time based on current imaging.   -Likely medicine admission for DKA  -No acute trauma surgical intervention required, please call with any further questions or concerns   -Consultants notified (specialty, provider name, time): none currently     Seen and discussed with attending Dr. Todd Layton MD   PGY-1 General Surgery  Trauma Surgery [id number]  ==============================================================================  PAST MEDICAL HISTORY:   PMH: Unknown  No past medical history on file.  Last menstrual period: Unknown     PSH: unknown  No past surgical history on file.  FH: unknown  No family history on file.  SOCIAL HISTORY:    Smoking: unknown    Social History     Tobacco Use   Smoking Status Not on  file   Smokeless Tobacco Not on file       Alcohol: Unknown    Social History     Substance and Sexual Activity   Alcohol Use Not on file       Drug use: unknown    MEDICATIONS: unknown  Prior to Admission medications    Not on File     ALLERGIES: unknown  Not on File    REVIEW OF SYSTEMS:  Review of Systems  PHYSICAL EXAM:  PRIMARY SURVEY:  Airway  Airway is compromised.     Breathing  Breathing is normal. Right breath sounds are normal. Left breath sounds are normal.   Interventions:  Intubation performed by ED Staff   Circulation  Cardiac rhythm is regular.   Pulses  Radial: 2+ on the right; on the left.  Femoral: 2+ on the right; on the left.  Pedal: 2+ on the right; on the left.    Disability  [place] Coma Score  Eye:1   Verbal:1T   Motor:3      5T  Pupils  Right Pupil:   round and reactive        Left Pupil:   round and reactive                 Exam - eFAST  No fluid in the pericardial window    No fluid in the abdomen right upper quadrant, abdomen left upper quadrant, pelvis, right lung and left lung.       SECONDARY SURVEY/PHYSICAL EXAM:  Secondary Survey:  NEURO: Unconcious, GCS 3, Non purposeful extremity movement, no eye opening, no verbal   HEAD: NC/AT, No lacerations or abrasions, no bony step offs, midface stable.  EENT: PERRL, EOMI. Pupils 4-2mm b/l. external ear without laceration. Nasal septum midline, no crepitus or septal hematoma. Oral mucosa and tongue without lacerations, teeth in place.   NECK: No cervical spine step offs, no lacerations or abrasions, trachea midline. No JVD.  RESPIRATORY/CHEST: No abrasions, contusions, crepitus or tenderness to palpation. Non-labored, equal chest expansion,  CV: RRR, Pulses bilateral: 2+ radial, 2+DP, 2+PT,  2+femoral. No TTP of chest  ABDOMEN: soft, nondistended. No scars, abrasions or lacerations.  PELVIS: Stable to compression.  : nml external genitalia, no blood at urethral meatus  BACK/SPINE: No thoracic midline step-offs or deformities. No lumbar  midline step-offs, or deformities.  No abrasions, hematomas or lacerations noted.  EXTREMITIES: No edema or cyanosis, No deformities, lacerations or contusions.    IMAGING SUMMARY:  (summary of findings, not a copy of dictation)  CT Head/Face: No evidence of infarct or ICH. No evidence of stenosis or large branch vessel cutoffs.   CT C-Spine: No traumatic spondylolisthesis of C-spine   CT Chest/Abd/Pelvis: Bibasilar pulmonary infiltrates, hepatic steatosis, fibroid uterus, otherwise unremarkable CT CAP/ CT T/L spines   CXR/PXR: No focal pulmonary consolidation or pleural effusions. No acute pelvic fx  Other(s): pending reads     LABS:  Results for orders placed or performed during the hospital encounter of 10/01/23 (from the past 24 hour(s))   Blood Gas Venous Full Panel Unsolicited   Result Value Ref Range    POCT pH, Venous 7.17 (LL) 7.33 - 7.43 pH    POCT pCO2, Venous 52 (H) 41 - 51 mm Hg    POCT pO2, Venous 50 (H) 35 - 45 mm Hg    POCT SO2, Venous 71 45 - 75 %    POCT Oxy Hemoglobin, Venous 68.8 45.0 - 75.0 %    POCT Hematocrit Calculated, Venous 52.0 (H) 36.0 - 46.0 %    POCT Sodium, Venous 132 (L) 136 - 145 mmol/L    POCT Potassium, Venous 6.1 (HH) 3.5 - 5.3 mmol/L    POCT Chloride, Venous 95 (L) 98 - 107 mmol/L    POCT Ionized Calicum, Venous 1.20 1.10 - 1.33 mmol/L    POCT Glucose, Venous 673 (HH) 74 - 99 mg/dL    POCT Lactate, Venous 13.3 (HH) 0.4 - 2.0 mmol/L    POCT Base Excess, Venous -10.0 (L) -2.0 - 3.0 mmol/L    POCT HCO3 Calculated, Venous 19.0 (L) 22.0 - 26.0 mmol/L    POCT Hemoglobin, Venous 17.3 (H) 12.0 - 16.0 g/dL    POCT Anion Gap, Venous 24.0 10.0 - 25.0 mmol/L    Patient Temperature 37.0 degrees Celsius       I have reviewed all laboratory and imaging results ordered/pertinent for this encounter.

## 2023-10-02 ENCOUNTER — APPOINTMENT (OUTPATIENT)
Dept: NEUROLOGY | Facility: HOSPITAL | Age: 58
DRG: 097 | End: 2023-10-02
Payer: COMMERCIAL

## 2023-10-02 ENCOUNTER — DOCUMENTATION (OUTPATIENT)
Dept: RESEARCH | Age: 58
End: 2023-10-02

## 2023-10-02 ENCOUNTER — APPOINTMENT (OUTPATIENT)
Dept: RADIOLOGY | Facility: HOSPITAL | Age: 58
End: 2023-10-02
Payer: COMMERCIAL

## 2023-10-02 ENCOUNTER — DOCUMENTATION (OUTPATIENT)
Dept: NEUROLOGY | Facility: HOSPITAL | Age: 58
End: 2023-10-02

## 2023-10-02 ENCOUNTER — APPOINTMENT (OUTPATIENT)
Dept: RADIOLOGY | Facility: HOSPITAL | Age: 58
DRG: 097 | End: 2023-10-02
Payer: COMMERCIAL

## 2023-10-02 PROBLEM — R83.6: Status: ACTIVE | Noted: 2023-10-02

## 2023-10-02 PROBLEM — R41.82 ALTERED MENTAL STATUS: Status: ACTIVE | Noted: 2023-10-02

## 2023-10-02 PROBLEM — R50.9 FEVER: Status: ACTIVE | Noted: 2023-10-02

## 2023-10-02 LAB
ALBUMIN SERPL BCP-MCNC: 3.4 G/DL (ref 3.4–5)
ALBUMIN SERPL BCP-MCNC: 4 G/DL (ref 3.4–5)
ALP SERPL-CCNC: 71 U/L (ref 33–110)
ALT SERPL W P-5'-P-CCNC: 16 U/L (ref 7–45)
ANION GAP BLDV CALCULATED.4IONS-SCNC: 11 MMOL/L (ref 10–25)
ANION GAP SERPL CALC-SCNC: 18 MMOL/L (ref 10–20)
ANION GAP SERPL CALC-SCNC: 35 MMOL/L (ref 10–20)
APPEARANCE CSF: CLEAR
AST SERPL W P-5'-P-CCNC: 24 U/L (ref 9–39)
B-HCG SERPL-ACNC: <3 MIU/ML
BASE EXCESS BLDV CALC-SCNC: 4.7 MMOL/L (ref -2–3)
BASOPHILS NFR CSF MANUAL: 0 %
BILIRUB SERPL-MCNC: 1 MG/DL (ref 0–1.2)
BLASTS CSF MANUAL: 0 %
BODY TEMPERATURE: 37 DEGREES CELSIUS
BUN SERPL-MCNC: 21 MG/DL (ref 6–23)
BUN SERPL-MCNC: 22 MG/DL (ref 6–23)
C GATTII+NEOFOR DNA CSF QL NAA+NON-PROBE: NOT DETECTED
CA-I BLDV-SCNC: 1.2 MMOL/L (ref 1.1–1.33)
CALCIUM SERPL-MCNC: 9.4 MG/DL (ref 8.6–10.6)
CALCIUM SERPL-MCNC: 9.8 MG/DL (ref 8.6–10.6)
CHLORIDE BLDV-SCNC: 100 MMOL/L (ref 98–107)
CHLORIDE SERPL-SCNC: 92 MMOL/L (ref 98–107)
CHLORIDE SERPL-SCNC: 98 MMOL/L (ref 98–107)
CHLORIDE UR-SCNC: <15 MMOL/L
CHLORIDE/CREATININE (MMOL/G) IN URINE: NORMAL
CHOLEST SERPL-MCNC: 324 MG/DL (ref 0–199)
CHOLESTEROL/HDL RATIO: 9
CK SERPL-CCNC: 833 U/L (ref 0–215)
CK SERPL-CCNC: 881 U/L (ref 0–215)
CMV DNA CSF QL NAA+NON-PROBE: NOT DETECTED
CO2 SERPL-SCNC: 13 MMOL/L (ref 21–32)
CO2 SERPL-SCNC: 23 MMOL/L (ref 21–32)
COLOR CSF: COLORLESS
COLOR CSF: COLORLESS
COLOR SPUN CSF: COLORLESS
CORTIS SERPL-MCNC: 34.2 UG/DL (ref 2.5–20)
CREAT SERPL-MCNC: 1.04 MG/DL (ref 0.5–1.05)
CREAT SERPL-MCNC: 1.07 MG/DL (ref 0.5–1.05)
CREAT UR-MCNC: 131.6 MG/DL (ref 20–320)
E COLI K1 DNA CSF QL NAA+NON-PROBE: NOT DETECTED
EOSINOPHIL NFR CSF MANUAL: 0 %
EST. AVERAGE GLUCOSE BLD GHB EST-MCNC: 338 MG/DL
EV RNA CSF QL NAA+NON-PROBE: NOT DETECTED
FERRITIN SERPL-MCNC: 526 NG/ML (ref 8–150)
FOLATE SERPL-MCNC: 13 NG/ML
GFR SERPL CREATININE-BSD FRML MDRD: 60 ML/MIN/1.73M*2
GFR SERPL CREATININE-BSD FRML MDRD: 62 ML/MIN/1.73M*2
GLUCOSE BLD MANUAL STRIP-MCNC: 168 MG/DL (ref 74–99)
GLUCOSE BLD MANUAL STRIP-MCNC: 195 MG/DL (ref 74–99)
GLUCOSE BLD MANUAL STRIP-MCNC: 249 MG/DL (ref 74–99)
GLUCOSE BLD MANUAL STRIP-MCNC: 299 MG/DL (ref 74–99)
GLUCOSE BLD MANUAL STRIP-MCNC: 332 MG/DL (ref 74–99)
GLUCOSE BLD MANUAL STRIP-MCNC: 381 MG/DL (ref 74–99)
GLUCOSE BLD MANUAL STRIP-MCNC: 384 MG/DL (ref 74–99)
GLUCOSE BLD MANUAL STRIP-MCNC: 408 MG/DL (ref 74–99)
GLUCOSE BLD MANUAL STRIP-MCNC: 511 MG/DL (ref 74–99)
GLUCOSE BLDV-MCNC: 357 MG/DL (ref 74–99)
GLUCOSE CSF-MCNC: 116 MG/DL (ref 40–70)
GLUCOSE SERPL-MCNC: 310 MG/DL (ref 74–99)
GLUCOSE SERPL-MCNC: 496 MG/DL (ref 74–99)
GP B STREP DNA CSF QL NAA+NON-PROBE: NOT DETECTED
HAEM INFLU DNA CSF QL NAA+NON-PROBE: NOT DETECTED
HBA1C MFR BLD: 13.4 %
HCO3 BLDV-SCNC: 28.2 MMOL/L (ref 22–26)
HCT VFR BLD EST: 42 % (ref 36–46)
HDLC SERPL-MCNC: 36.2 MG/DL
HGB BLDV-MCNC: 14 G/DL (ref 12–16)
HHV6 DNA CSF QL NAA+NON-PROBE: NOT DETECTED
HIV 1+2 AB+HIV1 P24 AG SERPL QL IA: NONREACTIVE
HSV1 DNA CSF QL NAA+NON-PROBE: NOT DETECTED
HSV1 DNA CSF QL NAA+PROBE: NOT DETECTED
HSV2 DNA CSF QL NAA+NON-PROBE: NOT DETECTED
HSV2 DNA CSF QL NAA+PROBE: NOT DETECTED
IMM GRANULOCYTES NFR CSF: 0 %
L MONOCYTOG DNA CSF QL NAA+NON-PROBE: NOT DETECTED
LACTATE BLDV-SCNC: 2.6 MMOL/L (ref 0.4–2)
LACTATE SERPL-SCNC: 1.9 MMOL/L (ref 0.4–2)
LACTATE SERPL-SCNC: 4 MMOL/L (ref 0.4–2)
LDLC SERPL CALC-MCNC: ABNORMAL MG/DL
LEGIONELLA AG UR QL: NEGATIVE
LYMPHOCYTES NFR CSF MANUAL: 45 % (ref 28–96)
MAGNESIUM SERPL-MCNC: 1.61 MG/DL (ref 1.6–2.4)
MAGNESIUM SERPL-MCNC: 1.69 MG/DL (ref 1.6–2.4)
MONOS+MACROS NFR CSF MANUAL: 21 % (ref 16–56)
MRSA DNA SPEC QL NAA+PROBE: DETECTED
N MEN DNA CSF QL NAA+NON-PROBE: NOT DETECTED
NEUTS SEG NFR CSF MANUAL: 34 % (ref 0–5)
NON HDL CHOLESTEROL: 288 MG/DL (ref 0–149)
OTHER CELLS NFR CSF MANUAL: 0 %
OXYHGB MFR BLDV: 67.9 % (ref 45–75)
PARECHOVIRUS A RNA CSF QL NAA+NON-PROBE: NOT DETECTED
PCO2 BLDV: 37 MM HG (ref 41–51)
PH BLDV: 7.49 PH (ref 7.33–7.43)
PHOSPHATE SERPL-MCNC: 2.7 MG/DL (ref 2.5–4.9)
PLASMA CELLS NFR CSF MICRO: 0 %
PO2 BLDV: 45 MM HG (ref 35–45)
POTASSIUM BLDV-SCNC: 3.5 MMOL/L (ref 3.5–5.3)
POTASSIUM SERPL-SCNC: 3.4 MMOL/L (ref 3.5–5.3)
POTASSIUM SERPL-SCNC: 3.5 MMOL/L (ref 3.5–5.3)
POTASSIUM UR-SCNC: 59 MMOL/L
POTASSIUM/CREAT UR-RTO: 45 MMOL/G CREAT
PROCALCITONIN SERPL-MCNC: 0.41 NG/ML
PROT CSF-MCNC: 47 MG/DL (ref 15–45)
PROT SERPL-MCNC: 7.2 G/DL (ref 6.4–8.2)
RBC # CSF AUTO: 3 /UL (ref 0–5)
S PNEUM AG UR QL: NEGATIVE
S PNEUM DNA CSF QL NAA+NON-PROBE: NOT DETECTED
SAO2 % BLDV: 70 % (ref 45–75)
SARS-COV-2 RNA RESP QL NAA+PROBE: NOT DETECTED
SODIUM BLDV-SCNC: 136 MMOL/L (ref 136–145)
SODIUM SERPL-SCNC: 136 MMOL/L (ref 136–145)
SODIUM SERPL-SCNC: 136 MMOL/L (ref 136–145)
SODIUM UR-SCNC: 16 MMOL/L
SODIUM/CREAT UR-RTO: 12 MMOL/G CREAT
TOTAL CELLS COUNTED CSF: 47
TRIGL SERPL-MCNC: 1422 MG/DL (ref 0–149)
TSH SERPL-ACNC: 3.11 MIU/L (ref 0.44–3.98)
TUBE # CSF: ABNORMAL
VDRL CSF-ACNC: NONREACTIVE
VIT B12 SERPL-MCNC: 448 PG/ML (ref 211–911)
VLDL: ABNORMAL
VZV DNA CSF QL NAA+NON-PROBE: NOT DETECTED
WBC # CSF AUTO: 27 /UL (ref 1–5)

## 2023-10-02 PROCEDURE — 80321 ALCOHOLS BIOMARKERS 1OR 2: CPT

## 2023-10-02 PROCEDURE — 74018 RADEX ABDOMEN 1 VIEW: CPT | Performed by: RADIOLOGY

## 2023-10-02 PROCEDURE — 80061 LIPID PANEL: CPT

## 2023-10-02 PROCEDURE — 82746 ASSAY OF FOLIC ACID SERUM: CPT

## 2023-10-02 PROCEDURE — 2500000001 HC RX 250 WO HCPCS SELF ADMINISTERED DRUGS (ALT 637 FOR MEDICARE OP)

## 2023-10-02 PROCEDURE — 36415 COLL VENOUS BLD VENIPUNCTURE: CPT

## 2023-10-02 PROCEDURE — 94003 VENT MGMT INPAT SUBQ DAY: CPT

## 2023-10-02 PROCEDURE — 82607 VITAMIN B-12: CPT

## 2023-10-02 PROCEDURE — 87070 CULTURE OTHR SPECIMN AEROBIC: CPT | Performed by: HOSPITALIST

## 2023-10-02 PROCEDURE — 009U3ZX DRAINAGE OF SPINAL CANAL, PERCUTANEOUS APPROACH, DIAGNOSTIC: ICD-10-PCS | Performed by: HOSPITALIST

## 2023-10-02 PROCEDURE — 80069 RENAL FUNCTION PANEL: CPT

## 2023-10-02 PROCEDURE — 62270 DX LMBR SPI PNXR: CPT | Performed by: HOSPITALIST

## 2023-10-02 PROCEDURE — 82550 ASSAY OF CK (CPK): CPT

## 2023-10-02 PROCEDURE — 82947 ASSAY GLUCOSE BLOOD QUANT: CPT

## 2023-10-02 PROCEDURE — 88104 CYTOPATH FL NONGYN SMEARS: CPT | Performed by: HOSPITALIST

## 2023-10-02 PROCEDURE — C9113 INJ PANTOPRAZOLE SODIUM, VIA: HCPCS

## 2023-10-02 PROCEDURE — 82728 ASSAY OF FERRITIN: CPT

## 2023-10-02 PROCEDURE — 89051 BODY FLUID CELL COUNT: CPT | Performed by: HOSPITALIST

## 2023-10-02 PROCEDURE — 2500000004 HC RX 250 GENERAL PHARMACY W/ HCPCS (ALT 636 FOR OP/ED)

## 2023-10-02 PROCEDURE — 74018 RADEX ABDOMEN 1 VIEW: CPT

## 2023-10-02 PROCEDURE — 84443 ASSAY THYROID STIM HORMONE: CPT

## 2023-10-02 PROCEDURE — 96372 THER/PROPH/DIAG INJ SC/IM: CPT

## 2023-10-02 PROCEDURE — 99291 CRITICAL CARE FIRST HOUR: CPT

## 2023-10-02 PROCEDURE — 82945 GLUCOSE OTHER FLUID: CPT | Performed by: HOSPITALIST

## 2023-10-02 PROCEDURE — 2020000001 HC ICU ROOM DAILY

## 2023-10-02 PROCEDURE — 2500000002 HC RX 250 W HCPCS SELF ADMINISTERED DRUGS (ALT 637 FOR MEDICARE OP, ALT 636 FOR OP/ED)

## 2023-10-02 PROCEDURE — 84157 ASSAY OF PROTEIN OTHER: CPT | Performed by: HOSPITALIST

## 2023-10-02 PROCEDURE — 83735 ASSAY OF MAGNESIUM: CPT

## 2023-10-02 PROCEDURE — 82553 CREATINE MB FRACTION: CPT

## 2023-10-02 PROCEDURE — 70553 MRI BRAIN STEM W/O & W/DYE: CPT | Performed by: RADIOLOGY

## 2023-10-02 PROCEDURE — 84295 ASSAY OF SERUM SODIUM: CPT

## 2023-10-02 PROCEDURE — 87632 RESP VIRUS 6-11 TARGETS: CPT

## 2023-10-02 PROCEDURE — 82533 TOTAL CORTISOL: CPT

## 2023-10-02 PROCEDURE — 86592 SYPHILIS TEST NON-TREP QUAL: CPT | Performed by: HOSPITALIST

## 2023-10-02 PROCEDURE — 87635 SARS-COV-2 COVID-19 AMP PRB: CPT

## 2023-10-02 PROCEDURE — 2500000005 HC RX 250 GENERAL PHARMACY W/O HCPCS

## 2023-10-02 PROCEDURE — 84300 ASSAY OF URINE SODIUM: CPT

## 2023-10-02 PROCEDURE — 87529 HSV DNA AMP PROBE: CPT | Performed by: HOSPITALIST

## 2023-10-02 PROCEDURE — 87483 CNS DNA AMP PROBE TYPE 12-25: CPT | Performed by: HOSPITALIST

## 2023-10-02 PROCEDURE — 84145 PROCALCITONIN (PCT): CPT

## 2023-10-02 PROCEDURE — 83605 ASSAY OF LACTIC ACID: CPT

## 2023-10-02 PROCEDURE — 99254 IP/OBS CNSLTJ NEW/EST MOD 60: CPT | Performed by: INTERNAL MEDICINE

## 2023-10-02 PROCEDURE — 70553 MRI BRAIN STEM W/O & W/DYE: CPT

## 2023-10-02 PROCEDURE — 87075 CULTR BACTERIA EXCEPT BLOOD: CPT | Performed by: HOSPITALIST

## 2023-10-02 PROCEDURE — 87899 AGENT NOS ASSAY W/OPTIC: CPT

## 2023-10-02 PROCEDURE — 99255 IP/OBS CONSLTJ NEW/EST HI 80: CPT | Performed by: PSYCHIATRY & NEUROLOGY

## 2023-10-02 PROCEDURE — 84702 CHORIONIC GONADOTROPIN TEST: CPT

## 2023-10-02 PROCEDURE — 87449 NOS EACH ORGANISM AG IA: CPT

## 2023-10-02 PROCEDURE — 84132 ASSAY OF SERUM POTASSIUM: CPT

## 2023-10-02 PROCEDURE — 62270 DX LMBR SPI PNXR: CPT | Performed by: INTERNAL MEDICINE

## 2023-10-02 PROCEDURE — 84100 ASSAY OF PHOSPHORUS: CPT

## 2023-10-02 PROCEDURE — 82570 ASSAY OF URINE CREATININE: CPT

## 2023-10-02 PROCEDURE — 2580000001 HC RX 258 IV SOLUTIONS

## 2023-10-02 RX ORDER — MIDAZOLAM HYDROCHLORIDE 1 MG/ML
2 INJECTION INTRAMUSCULAR; INTRAVENOUS ONCE
Status: COMPLETED | OUTPATIENT
Start: 2023-10-02 | End: 2023-10-02

## 2023-10-02 RX ORDER — ACETAMINOPHEN 325 MG/1
650 TABLET ORAL EVERY 4 HOURS
Status: DISCONTINUED | OUTPATIENT
Start: 2023-10-02 | End: 2023-10-04

## 2023-10-02 RX ORDER — CEFTRIAXONE 2 G/50ML
2 INJECTION, SOLUTION INTRAVENOUS EVERY 12 HOURS
Status: DISCONTINUED | OUTPATIENT
Start: 2023-10-02 | End: 2023-10-03

## 2023-10-02 RX ORDER — DEXTROSE 50 % IN WATER (D50W) INTRAVENOUS SYRINGE
25
Status: DISCONTINUED | OUTPATIENT
Start: 2023-10-02 | End: 2023-10-07 | Stop reason: HOSPADM

## 2023-10-02 RX ORDER — INSULIN GLARGINE 100 [IU]/ML
6 INJECTION, SOLUTION SUBCUTANEOUS NIGHTLY
Status: DISCONTINUED | OUTPATIENT
Start: 2023-10-02 | End: 2023-10-02

## 2023-10-02 RX ORDER — PANTOPRAZOLE SODIUM 40 MG/10ML
40 INJECTION, POWDER, LYOPHILIZED, FOR SOLUTION INTRAVENOUS DAILY
Status: DISCONTINUED | OUTPATIENT
Start: 2023-10-02 | End: 2023-10-03

## 2023-10-02 RX ORDER — INSULIN LISPRO 100 [IU]/ML
0-10 INJECTION, SOLUTION INTRAVENOUS; SUBCUTANEOUS EVERY 4 HOURS
Status: DISCONTINUED | OUTPATIENT
Start: 2023-10-02 | End: 2023-10-04

## 2023-10-02 RX ORDER — SODIUM,POTASSIUM PHOSPHATES 280-250MG
1 POWDER IN PACKET (EA) ORAL 4 TIMES DAILY
Status: COMPLETED | OUTPATIENT
Start: 2023-10-02 | End: 2023-10-02

## 2023-10-02 RX ORDER — INSULIN LISPRO 100 [IU]/ML
5 INJECTION, SOLUTION INTRAVENOUS; SUBCUTANEOUS ONCE
Status: COMPLETED | OUTPATIENT
Start: 2023-10-02 | End: 2023-10-02

## 2023-10-02 RX ORDER — PROPOFOL 10 MG/ML
INJECTION, EMULSION INTRAVENOUS
Status: COMPLETED
Start: 2023-10-02 | End: 2023-10-02

## 2023-10-02 RX ORDER — PROPOFOL 10 MG/ML
5-50 INJECTION, EMULSION INTRAVENOUS CONTINUOUS
Status: DISCONTINUED | OUTPATIENT
Start: 2023-10-02 | End: 2023-10-03

## 2023-10-02 RX ORDER — DEXTROSE MONOHYDRATE 100 MG/ML
0.3 INJECTION, SOLUTION INTRAVENOUS ONCE AS NEEDED
Status: DISCONTINUED | OUTPATIENT
Start: 2023-10-02 | End: 2023-10-03

## 2023-10-02 RX ORDER — PROPOFOL 10 MG/ML
INJECTION, EMULSION INTRAVENOUS
Status: DISPENSED
Start: 2023-10-02 | End: 2023-10-03

## 2023-10-02 RX ORDER — HEPARIN SODIUM 5000 [USP'U]/ML
5000 INJECTION, SOLUTION INTRAVENOUS; SUBCUTANEOUS EVERY 8 HOURS
Status: DISCONTINUED | OUTPATIENT
Start: 2023-10-02 | End: 2023-10-05

## 2023-10-02 RX ORDER — MAGNESIUM SULFATE HEPTAHYDRATE 40 MG/ML
INJECTION, SOLUTION INTRAVENOUS
Status: DISPENSED
Start: 2023-10-02 | End: 2023-10-02

## 2023-10-02 RX ORDER — VANCOMYCIN HYDROCHLORIDE 750 MG/150ML
750 INJECTION, SOLUTION INTRAVENOUS EVERY 12 HOURS
Status: DISCONTINUED | OUTPATIENT
Start: 2023-10-02 | End: 2023-10-03

## 2023-10-02 RX ORDER — MIDAZOLAM HYDROCHLORIDE 1 MG/ML
2 INJECTION INTRAMUSCULAR; INTRAVENOUS AS NEEDED
Status: DISCONTINUED | OUTPATIENT
Start: 2023-10-02 | End: 2023-10-03

## 2023-10-02 RX ORDER — INSULIN GLARGINE 100 [IU]/ML
10 INJECTION, SOLUTION SUBCUTANEOUS NIGHTLY
Status: DISCONTINUED | OUTPATIENT
Start: 2023-10-02 | End: 2023-10-03

## 2023-10-02 RX ORDER — DEXMEDETOMIDINE HYDROCHLORIDE 4 UG/ML
.1-1.5 INJECTION, SOLUTION INTRAVENOUS CONTINUOUS
Status: DISCONTINUED | OUTPATIENT
Start: 2023-10-02 | End: 2023-10-04

## 2023-10-02 RX ORDER — MAGNESIUM SULFATE HEPTAHYDRATE 40 MG/ML
2 INJECTION, SOLUTION INTRAVENOUS ONCE
Status: COMPLETED | OUTPATIENT
Start: 2023-10-02 | End: 2023-10-02

## 2023-10-02 RX ORDER — LEVETIRACETAM 500 MG/1
750 TABLET ORAL 2 TIMES DAILY
Status: DISCONTINUED | OUTPATIENT
Start: 2023-10-02 | End: 2023-10-03

## 2023-10-02 RX ADMIN — LEVETIRACETAM 750 MG: 500 TABLET, FILM COATED ORAL at 23:30

## 2023-10-02 RX ADMIN — INSULIN LISPRO 10 UNITS: 100 INJECTION, SOLUTION INTRAVENOUS; SUBCUTANEOUS at 16:00

## 2023-10-02 RX ADMIN — VANCOMYCIN HYDROCHLORIDE 750 MG: 750 INJECTION, SOLUTION INTRAVENOUS at 07:53

## 2023-10-02 RX ADMIN — DOXYCYCLINE 100 MG: 100 INJECTION, POWDER, LYOPHILIZED, FOR SOLUTION INTRAVENOUS at 18:30

## 2023-10-02 RX ADMIN — PROPOFOL 1000 MG: 10 INJECTION, EMULSION INTRAVENOUS at 21:59

## 2023-10-02 RX ADMIN — MIDAZOLAM HYDROCHLORIDE 2 MG: 1 INJECTION, SOLUTION INTRAMUSCULAR; INTRAVENOUS at 15:15

## 2023-10-02 RX ADMIN — ACETAMINOPHEN 650 MG: 325 TABLET, FILM COATED ORAL at 05:37

## 2023-10-02 RX ADMIN — Medication 80 %: at 08:00

## 2023-10-02 RX ADMIN — ACYCLOVIR SODIUM 675 MG: 50 INJECTION, SOLUTION INTRAVENOUS at 11:00

## 2023-10-02 RX ADMIN — DEXAMETHASONE SODIUM PHOSPHATE 10 MG: 10 INJECTION, SOLUTION INTRAMUSCULAR; INTRAVENOUS at 05:10

## 2023-10-02 RX ADMIN — POTASSIUM CHLORIDE 20 MEQ: 14.9 INJECTION, SOLUTION INTRAVENOUS at 00:01

## 2023-10-02 RX ADMIN — Medication 75 MCG/HR: at 00:26

## 2023-10-02 RX ADMIN — AMPICILLIN 2 G: 2 INJECTION, POWDER, FOR SOLUTION INTRAVENOUS at 12:00

## 2023-10-02 RX ADMIN — HEPARIN SODIUM 5000 UNITS: 5000 INJECTION INTRAVENOUS; SUBCUTANEOUS at 10:07

## 2023-10-02 RX ADMIN — PROPOFOL 40 MCG/KG/MIN: 10 INJECTION, EMULSION INTRAVENOUS at 10:15

## 2023-10-02 RX ADMIN — PROPOFOL 1000 MG: 10 INJECTION, EMULSION INTRAVENOUS at 21:10

## 2023-10-02 RX ADMIN — AMPICILLIN 2 G: 2 INJECTION, POWDER, FOR SOLUTION INTRAVENOUS at 16:00

## 2023-10-02 RX ADMIN — HEPARIN SODIUM 5000 UNITS: 5000 INJECTION INTRAVENOUS; SUBCUTANEOUS at 16:32

## 2023-10-02 RX ADMIN — CEFTRIAXONE SODIUM 2 G: 2 INJECTION, SOLUTION INTRAVENOUS at 14:17

## 2023-10-02 RX ADMIN — POTASSIUM & SODIUM PHOSPHATES POWDER PACK 280-160-250 MG 1 PACKET: 280-160-250 PACK at 14:46

## 2023-10-02 RX ADMIN — VANCOMYCIN HYDROCHLORIDE 750 MG: 750 INJECTION, SOLUTION INTRAVENOUS at 20:26

## 2023-10-02 RX ADMIN — SODIUM CHLORIDE, POTASSIUM CHLORIDE, SODIUM LACTATE AND CALCIUM CHLORIDE 500 ML: 600; 310; 30; 20 INJECTION, SOLUTION INTRAVENOUS at 14:12

## 2023-10-02 RX ADMIN — CEFTRIAXONE SODIUM 2 G: 2 INJECTION, SOLUTION INTRAVENOUS at 05:10

## 2023-10-02 RX ADMIN — PANTOPRAZOLE SODIUM 40 MG: 40 INJECTION, POWDER, FOR SOLUTION INTRAVENOUS at 10:07

## 2023-10-02 RX ADMIN — MIDAZOLAM HYDROCHLORIDE 2 MG: 1 INJECTION, SOLUTION INTRAMUSCULAR; INTRAVENOUS at 18:52

## 2023-10-02 RX ADMIN — MIDAZOLAM HYDROCHLORIDE 2 MG: 1 INJECTION, SOLUTION INTRAMUSCULAR; INTRAVENOUS at 21:14

## 2023-10-02 RX ADMIN — THIAMINE HYDROCHLORIDE 500 MG: 100 INJECTION, SOLUTION INTRAMUSCULAR; INTRAVENOUS at 15:00

## 2023-10-02 RX ADMIN — MAGNESIUM SULFATE HEPTAHYDRATE 2 G: 40 INJECTION, SOLUTION INTRAVENOUS at 05:40

## 2023-10-02 RX ADMIN — INSULIN GLARGINE 10 UNITS: 100 INJECTION, SOLUTION SUBCUTANEOUS at 21:22

## 2023-10-02 RX ADMIN — DEXAMETHASONE SODIUM PHOSPHATE 10 MG: 10 INJECTION, SOLUTION INTRAMUSCULAR; INTRAVENOUS at 18:14

## 2023-10-02 RX ADMIN — PROPOFOL 40 MCG/KG/MIN: 10 INJECTION, EMULSION INTRAVENOUS at 12:35

## 2023-10-02 RX ADMIN — POTASSIUM & SODIUM PHOSPHATES POWDER PACK 280-160-250 MG 1 PACKET: 280-160-250 PACK at 09:14

## 2023-10-02 RX ADMIN — Medication 1 TABLET: at 09:00

## 2023-10-02 RX ADMIN — INSULIN LISPRO 6 UNITS: 100 INJECTION, SOLUTION INTRAVENOUS; SUBCUTANEOUS at 20:27

## 2023-10-02 RX ADMIN — THIAMINE HYDROCHLORIDE 500 MG: 100 INJECTION, SOLUTION INTRAMUSCULAR; INTRAVENOUS at 09:00

## 2023-10-02 RX ADMIN — ACYCLOVIR SODIUM 675 MG: 50 INJECTION, SOLUTION INTRAVENOUS at 05:38

## 2023-10-02 RX ADMIN — ACETAMINOPHEN 650 MG: 325 TABLET, FILM COATED ORAL at 09:30

## 2023-10-02 RX ADMIN — INSULIN LISPRO 5 UNITS: 100 INJECTION, SOLUTION INTRAVENOUS; SUBCUTANEOUS at 10:45

## 2023-10-02 RX ADMIN — Medication 50 MCG/HR: at 12:26

## 2023-10-02 RX ADMIN — FOLIC ACID 1 MG: 1 TABLET ORAL at 09:00

## 2023-10-02 RX ADMIN — Medication 100 MCG/HR: at 22:00

## 2023-10-02 RX ADMIN — INSULIN LISPRO 10 UNITS: 100 INJECTION, SOLUTION INTRAVENOUS; SUBCUTANEOUS at 08:00

## 2023-10-02 RX ADMIN — DEXAMETHASONE SODIUM PHOSPHATE 10 MG: 10 INJECTION, SOLUTION INTRAMUSCULAR; INTRAVENOUS at 12:12

## 2023-10-02 RX ADMIN — PROPOFOL 20 MCG/KG/MIN: 10 INJECTION, EMULSION INTRAVENOUS at 08:13

## 2023-10-02 RX ADMIN — PROPOFOL 50 MCG/KG/MIN: 10 INJECTION, EMULSION INTRAVENOUS at 06:35

## 2023-10-02 RX ADMIN — Medication: at 13:45

## 2023-10-02 RX ADMIN — ACETAMINOPHEN 650 MG: 325 TABLET, FILM COATED ORAL at 21:11

## 2023-10-02 RX ADMIN — Medication 75 MCG/HR: at 00:35

## 2023-10-02 RX ADMIN — ACETAMINOPHEN 650 MG: 325 TABLET, FILM COATED ORAL at 16:32

## 2023-10-02 RX ADMIN — THIAMINE HYDROCHLORIDE 500 MG: 100 INJECTION, SOLUTION INTRAMUSCULAR; INTRAVENOUS at 22:26

## 2023-10-02 RX ADMIN — INSULIN LISPRO 10 UNITS: 100 INJECTION, SOLUTION INTRAVENOUS; SUBCUTANEOUS at 12:00

## 2023-10-02 RX ADMIN — ACYCLOVIR SODIUM 675 MG: 50 INJECTION, SOLUTION INTRAVENOUS at 17:26

## 2023-10-02 RX ADMIN — MIDAZOLAM HYDROCHLORIDE 2 MG: 1 INJECTION, SOLUTION INTRAMUSCULAR; INTRAVENOUS at 23:45

## 2023-10-02 SDOH — SOCIAL STABILITY: SOCIAL INSECURITY
WITHIN THE LAST YEAR, HAVE YOU BEEN RAPED OR FORCED TO HAVE ANY KIND OF SEXUAL ACTIVITY BY YOUR PARTNER OR EX-PARTNER?: NO

## 2023-10-02 SDOH — ECONOMIC STABILITY: HOUSING INSECURITY
IN THE LAST 12 MONTHS, WAS THERE A TIME WHEN YOU DID NOT HAVE A STEADY PLACE TO SLEEP OR SLEPT IN A SHELTER (INCLUDING NOW)?: NO

## 2023-10-02 SDOH — ECONOMIC STABILITY: FOOD INSECURITY: WITHIN THE PAST 12 MONTHS, THE FOOD YOU BOUGHT JUST DIDN'T LAST AND YOU DIDN'T HAVE MONEY TO GET MORE.: NEVER TRUE

## 2023-10-02 SDOH — ECONOMIC STABILITY: HOUSING INSECURITY: IN THE LAST 12 MONTHS, WAS THERE A TIME WHEN YOU WERE NOT ABLE TO PAY THE MORTGAGE OR RENT ON TIME?: NO

## 2023-10-02 SDOH — ECONOMIC STABILITY: FOOD INSECURITY: WITHIN THE PAST 12 MONTHS, YOU WORRIED THAT YOUR FOOD WOULD RUN OUT BEFORE YOU GOT MONEY TO BUY MORE.: NEVER TRUE

## 2023-10-02 SDOH — ECONOMIC STABILITY: HOUSING INSECURITY: IN THE LAST 12 MONTHS, HOW MANY PLACES HAVE YOU LIVED?: 1

## 2023-10-02 SDOH — SOCIAL STABILITY: SOCIAL INSECURITY
WITHIN THE LAST YEAR, HAVE YOU BEEN KICKED, HIT, SLAPPED, OR OTHERWISE PHYSICALLY HURT BY YOUR PARTNER OR EX-PARTNER?: NO

## 2023-10-02 SDOH — ECONOMIC STABILITY: FOOD INSECURITY: WITHIN THE PAST 12 MONTHS, YOU WORRIED THAT YOUR FOOD WOULD RUN OUT BEFORE YOU GOT THE MONEY TO BUY MORE.: NEVER TRUE

## 2023-10-02 SDOH — ECONOMIC STABILITY: INCOME INSECURITY: IN THE LAST 12 MONTHS, WAS THERE A TIME WHEN YOU WERE NOT ABLE TO PAY THE MORTGAGE OR RENT ON TIME?: NO

## 2023-10-02 SDOH — ECONOMIC STABILITY: TRANSPORTATION INSECURITY
IN THE PAST 12 MONTHS, HAS LACK OF TRANSPORTATION KEPT YOU FROM MEETINGS, WORK, OR FROM GETTING THINGS NEEDED FOR DAILY LIVING?: NO

## 2023-10-02 SDOH — SOCIAL STABILITY: SOCIAL INSECURITY: WITHIN THE LAST YEAR, HAVE YOU BEEN HUMILIATED OR EMOTIONALLY ABUSED IN OTHER WAYS BY YOUR PARTNER OR EX-PARTNER?: NO

## 2023-10-02 SDOH — SOCIAL STABILITY: SOCIAL INSECURITY
WITHIN THE LAST YEAR, HAVE TO BEEN RAPED OR FORCED TO HAVE ANY KIND OF SEXUAL ACTIVITY BY YOUR PARTNER OR EX-PARTNER?: NO

## 2023-10-02 SDOH — SOCIAL STABILITY: SOCIAL INSECURITY: WITHIN THE LAST YEAR, HAVE YOU BEEN AFRAID OF YOUR PARTNER OR EX-PARTNER?: NO

## 2023-10-02 SDOH — ECONOMIC STABILITY: TRANSPORTATION INSECURITY: IN THE PAST 12 MONTHS, HAS LACK OF TRANSPORTATION KEPT YOU FROM MEDICAL APPOINTMENTS OR FROM GETTING MEDICATIONS?: NO

## 2023-10-02 SDOH — ECONOMIC STABILITY: TRANSPORTATION INSECURITY
IN THE PAST 12 MONTHS, HAS THE LACK OF TRANSPORTATION KEPT YOU FROM MEDICAL APPOINTMENTS OR FROM GETTING MEDICATIONS?: NO

## 2023-10-02 ASSESSMENT — PAIN - FUNCTIONAL ASSESSMENT
PAIN_FUNCTIONAL_ASSESSMENT: CPOT (CRITICAL CARE PAIN OBSERVATION TOOL)

## 2023-10-02 ASSESSMENT — ACTIVITIES OF DAILY LIVING (ADL): LACK_OF_TRANSPORTATION: NO

## 2023-10-02 NOTE — PROGRESS NOTES
10/02/23 1531   Discharge Planning   Living Arrangements Parent   Support Systems Parent;Family members   Assistance Needed independent   Type of Residence Private residence   Home or Post Acute Services Other (Comment)  (Currently intubated, TBD)   Patient expects to be discharged to: TBD, currently intubated       SW spoke with mother Aparna ([phone] or [phone]) to complete assessment. Patient lives at home with her in [facility]. Patient is independent  at baseline and helps care for mother. No current needs at home. PCP appointment with  pending 11/1. Social work to follow.   THADDEUS Osborne, LISW-S ([id number])

## 2023-10-02 NOTE — PROGRESS NOTES
"Vancomycin Dosing by Pharmacy- INITIAL    Two Trauma Sotero is a 53 y.o. year old female who Pharmacy has been consulted for vancomycin dosing for CNS/meningoencephalitis. Based on the patient's indication and renal status this patient will be dosed based on a goal AUC of 500-600.     Renal function is currently stable.    Visit Vitals  BP 95/61   Pulse 80   Temp 38.8 °C (101.8 °F)   Resp 18        Lab Results   Component Value Date    CREATININE 1.07 (H) 10/01/2023    CREATININE 1.04 10/01/2023        Patient weight is No results found for: \"PTWEIGHT\"    No results found for: \"CULTURE\"     No intake/output data recorded.  [unfilled]    Lab Results   Component Value Date    PATIENTTEMP 37.0 10/01/2023    PATIENTTEMP 37.0 10/01/2023    PATIENTTEMP 37.0 10/01/2023          Assessment/Plan     Patient has already been given a loading dose of 2000 mg 10/1 @ 1250  Will initiate vancomycin maintenance,  750 mg every 12 hours.    This dosing regimen is predicted by [employer] to result in the following pharmacokinetic parameters:  Exposure target: AUC24 (range) 500-600 mg/L.hr   AUC24,ss: 544 mg/L.hr  Probability of AUC24 > 400: 81 %  Ctrough,ss: 18.1 mg/L  Probability of Ctrough,ss > 20: 41 %  Probability of nephrotoxicity (Lodise JOSE ANGEL 2009): 14 %    Follow-up level will be ordered on 10/3 with AM labs unless clinically indicated sooner.  Will continue to monitor renal function daily while on vancomycin and order serum creatinine at least every 48 hours if not already ordered.  Follow for continued vancomycin needs, clinical response, and signs/symptoms of toxicity.       Loyd Boone, PharmD       "

## 2023-10-02 NOTE — CONSULTS
"Nutrition Assessment Note  Assessment    Assessment:  Reason for Assessment  Reason for Assessment: Dietitian discretion (Team started patient on tube feed this morning)    History:    Pt admitted after being found down at home with slurred speech and thrashing.  She is currently intubated, sedated on propofol at 19.2mls/hr (507kcals daily from fat).  She has an OGT in place for enteral access.  Team ordered her Isosource 1.5@ 10mls/hr this morning.     PMHx includes type 2 DM    Anthropometrics:  Height: 167.6 cm (5' 5.98\")  Weight: 66.7 kg (147 lb 0.8 oz)  BMI (Calculated): 23.75    Weight Change: 0              IBW/kg (Dietitian Calculated): 59 kg  Percent of IBW: 113 %       Biochemical Data, Medical Tests and Procedures     Na+ 138  K+ 3.5  Chloride 100  Bicarb 21  BUN 24  Creatinine 1.07  Calcium 9.1  Phos 2.4           Energy Needs:  Calculated Energy Needs Using Equations  Height: 167.6 cm (5' 5.98\")  Temp: 35.9 °C (96.6 °F)    Estimated Energy Needs  Total Estimated Energy Need per Day (kCal/kg):  (3593-1602)  Method for Estimating Needs:  ([place]= MV= 4.9, RMR= 1284)    Estimated Protein Needs  Total Protein Estimated Needs (g):  (70-80grams/day)  Method for Estimating Needs:  (1.2-1.4 x 59kg)              Nutrition Focused Physical Findings:  Subcutaneous Fat Loss  Orbital Fat Pads: Mild-Moderate (slight dark circles and slight hollowing)  Buccal Fat Pads: Mild-Moderate (flat cheeks, minimal bounce)    Muscle Wasting  Temporalis: Mild-Moderate (slight depression)  Pectoralis (Clavicular Region): Mild-Moderate (some protrusion of clavicle)  Deltoid/Trapezius: Mild-Moderate (slight protrusion of acromion process)  Quadriceps: Mild-moderate (mild depression on inner and outer thigh)  Gastrocnemius: Mild-Moderate (not well developed muscle)           Diagnosis   Diagnosis:          Nutrition Diagnosis 1: Increased nutrient needs  Diagnosis Status (1): New  Related to (1):  (critical illness)  As Evidenced " by (1):  (altered metabolism in the setting of critical illness)           Difficult to determine malnutrition in patient at this time without a weight or nutrition history available.  No noted A1c available at this time in patient with history of DM.                Interventions/Recommendations   Interventions/Recommendations:  Nutrition Prescription  Individualized Nutrition Prescription Provided for :  (Start patient on lsosource 1.5@ 15mls/hr.  While on current propofol rate, increase once after 6hrs to goal rate of 25mls/hr.  Once off of Propofol increase to new goal rate of 35mls/hr and add one packet of Pro-Stat AWC to orders.)    Food and/or Nutrient Delivery Interventions        Pt's tube feed regimen provides:     Isosource 1.5@ 25mls/hr with Propofol= 1407kcals, 41grams protein  Isosource 1.5@ 35mls/hr with Prostat= 1360kcals, 74grams protein      Education Documentation  No documentation found.        Monitoring and Evaluation   Monitoring/Evaluation:  Food and Nutrient Related History                                   Jeaneth Hebert RDN, LD

## 2023-10-02 NOTE — PROGRESS NOTES
Valentina Fontanez is a 58 y.o. female on day 1 of admission presenting with Diabetic ketoacidosis with coma associated with other specified diabetes mellitus (CMS/HCC).    Subjective   Intubated and sedated this morning. Her propofol was weaned down and she began getting very agitated, tugging on her restraints.    Active Medications  acetaminophen, 650 mg, oral, q4h  acyclovir, 675 mg, intravenous, q8h  ampicillin, 2 g, intravenous, q4h  cefTRIAXone, 2 g, intravenous, q12h  dexAMETHasone, 0.15 mg/kg, intravenous, q6h  folic acid, 1 mg, oral, Daily  heparin (porcine), 5,000 Units, subcutaneous, q8h  insulin glargine, 10 Units, subcutaneous, Nightly  insulin lispro, 0-10 Units, subcutaneous, q4h  lactated Ringer's, 500 mL, intravenous, Once  magnesium sulfate, , ,   multivitamin with minerals, 1 tablet, oral, Daily  [START ON 10/3/2023] oxygen, , inhalation, Continuous - 02/gases  pantoprazole, 40 mg, intravenous, Daily  potassium chloride, 40 mEq, intravenous, Once  potassium, sodium phosphates, 1 packet, oral, 4x daily  propofol, , ,   sodium chloride, 1,000 mL, intravenous, Once  thiamine, 500 mg, intravenous, TID  vancomycin, 750 mg, intravenous, q12h         Review of Systems   Unable to perform ROS: Mental status change          Objective     Physical Exam  Constitutional:       Comments: Intubated and sedated   HENT:      Head: Normocephalic and atraumatic.   Eyes:      Conjunctiva/sclera: Conjunctivae normal.      Pupils: Pupils are equal, round, and reactive to light.   Cardiovascular:      Rate and Rhythm: Normal rate and regular rhythm.      Pulses: Normal pulses.      Heart sounds: Normal heart sounds.   Pulmonary:      Comments: Intubated. Breath sounds equal bilaterally with faint bibasilar crackles  Abdominal:      General: Abdomen is flat. There is no distension.      Palpations: Abdomen is soft.      Tenderness: There is no abdominal tenderness.   Musculoskeletal:      Right lower leg: No edema.      Left  "lower leg: No edema.   Neurological:      Comments: PERRL. Not responsive to questioning but withdrawing to pain. Moving all four extremities equally while agitated but will periodically stop moving and stay still         Last Recorded Vitals  Blood pressure 103/66, pulse 57, temperature 35.9 °C (96.6 °F), resp. rate 14, height 1.676 m (5' 5.98\"), weight 66.7 kg (147 lb 0.8 oz), SpO2 99 %.  Intake/Output last 3 Shifts:  I/O last 3 completed shifts:  In: 1243.1 (18.6 mL/kg) [I.V.:140.6 (2.1 mL/kg); IV Piggyback:1102.5]  Out: 910 (13.6 mL/kg) [Urine:910 (0.4 mL/kg/hr)]  Weight: 66.7 kg     Relevant Results  Results from last 7 days   Lab Units 10/01/23  1124   WBC AUTO x10*3/uL 14.6*   HEMOGLOBIN g/dL 14.9   HEMATOCRIT % 42.5   PLATELETS AUTO x10*3/uL 265      Results from last 7 days   Lab Units 10/02/23  0254   SODIUM mmol/L 138   POTASSIUM mmol/L 3.5   CHLORIDE mmol/L 100   CO2 mmol/L 21   BUN mg/dL 24*   CREATININE mg/dL 1.07*   GLUCOSE mg/dL 142*   CALCIUM mg/dL 9.1        CT cervical/thoracic/lumbar spine wo IV contrast, CT chest/abdomen/pelvis    Result Date: 10/1/2023      Bibasilar pulmonary infiltrates. Hepatic steatosis. Fibroid uterus. Otherwise unremarkable CT scan of the chest, abdomen, pelvis, thoracic and lumbosacral spines. There are no acute fractures.       CT head W O contrast trauma protocol    Result Date: 10/1/2023    1. No acute large territorial infarct or intracranial hemorrhage. 2. No evidence for significant stenosis or large branch vessel cutoffs of the intracranial vessels. No evidence of traumatic arterial injury. 3. No traumatic spondylolisthesis of the cervical spine. 4. Enteric tube coils within the pharynx and terminates within the right piriform sinus and recommend repositioning. 5. Endotracheal tube with its tip terminating approximately 1.6 cm above the level of the rubens.                  Assessment/Plan   Valentina Fontanez is a 52 yo F with reported T2D (per mother, no records " available) who presented to the ED after being found down   by mother this AM. Pt reportedly seized en route to hospital, s/p IM Versed by EMS. GCS 4 in ED, s/p intubation. While in the ED, pt afebrile, tachycardic, hypertensive. Imaging unremarkable, no acute trauma surgical intervention per gen surgery. Labs notable for hyperglycemia, AG metabolic acidosis with lactate 13. Alcohol <10, Utox positive only for benzos (likely Versed). Unclear etiology of encephalopathy, possibly 2/2 infectious (i/s/o persistent high fevers) vs withdrawal/intoxication vs metabolic. Low c/f DKA, will dc insulin gtt and start basal-bolus. Continue vanc, start doxy for atypical coverage and acyclovir/CTX for empiric meningitis treatment. Plan to wean sedation as able to assess mentation. Investigation of seizure activity with MRI/EEG.    Neuro:  #Acute encephalopathy  #Reported seizures  #C/f meningoencephalitis  ::CT Head, CTA Head/Neck negative for acute abnormalities  ::Seizures possibly secondary to infection vs alcohol withdrawal  ::Persistently febrile on admission  ::LP done overnight, lab work demonstrating WBC count of 27, protein 47, glucose 116, lymphocytes 45%  ::Meningitis PCR panel negative including HSV negative  -MRI ordered  -cvEEG once MRI is done  -Empiric abx coverage with vancomycin, ceftriaxone, ampicillin, dexamethasone, acyclovir  -Acyclovir maintained due to high pre-test probability of HSV encephalitis, pending MRI  -Tylenol PRN for fever  -Neuro and ID consulted, appreciate recs    #Alcohol use disorder  -[facility] protocol  -IV thiamine 500mg TID    #Sedation  -Propofol initiated, triglyceride level of 1422  -Will attempt to wean propofol. If unable, planning to place precedex drip  -Fentanyl drip as well    CV:  #Hypertension  ::Initial blood pressures as high as 239/127  ::S/p nicardipine drip, now normotensive  -Blood pressures have dropped to normotension on propofol infusion  -Hydralazine PRN for  hypertension    Pulm:  #Acute hypoxic respiratory failure  #C/f aspiration pneumonia  ::Intubated, vent settings: 18/450/5/40%  ::Unclear cause, possibly secondary to aspiration pneumonia vs aspiration pneumonitis  ::Right basilar infiltrates seen on CT Chest consistent with aspiration  ::Legionella, Strep Ag tests negative  ::Procal of 0.41  ::MRSA nares+  ::Blood cultures NGTD  -Being covered with vancomycin, ceftriaxone, ampicillin  -Weaning down on sedation, will attempt extubation when her mental status has improved. On minimal vent settings currently    GI:  #Nutrition  -OG tube in place  -Trickle feeds with isosource 1.5 @ 15cc/hr    Renal:  #YUMI  ::Reported, although no known baseline  ::Likely prerenal in setting of infection, seizures  ::Urine lytes showing FENa of <.01%  -500cc LR bolus given today  -Continue to trend RFPs  -Valerio in place. Monitor I/O's  -Avoid nephrotoxic medications    Infectious Disease:  #C/f meningoencephalitis  #C/f aspiration pneumonia  ::Abx day #2 (10/1-*)  -Vancomycin 750mg daily, pharmacy dosing  -Ceftriaxone 2g daily  -Ampicillin 2g q12  -Acyclovir 675mg q8h  -Dexamethasone 10mg q6h x4days    Endo:  #Hyperglycemia  -Low concern for DKA/HHS  -Blood glucose in normal ranges  -Insulin gtt discontinued, on Lantus 10u daily, moderate SSI    MSK  #C/f rhabdomyolysis  ::CK likely elevated in setting of seizure  -Continuing to trend  -500cc LR bolus given today    Ventilator: Day #2 (10/1-*), 18/450/5/40%  Electrolytes: replete PRN  Access/Lines/Tubes: PIVx3, ETT, OG tube, Valerio  Abx: Vancomycin, ceftriaxone, ampicillin, acyclovir (10/1-*)  Drips: Fentanyl  PPX: subcutaneous heparing, protonix  Code status: FULL CODE  NOK: Aparna (mother) [phone]         Wan Wright MD

## 2023-10-02 NOTE — PROCEDURES
Lumbar Puncture    Date/Time: 10/2/2023 4:45 AM    Performed by: Jarrod Miller MD  Authorized by: Kristen CONDON MD    Consent:     Consent obtained:  Verbal    Consent given by:  Parent    Risks, benefits, and alternatives were discussed: yes      Risks discussed:  Bleeding and infection    Alternatives discussed:  Delayed treatment and observation  Universal protocol:     Procedure explained and questions answered to patient or proxy's satisfaction: yes      Relevant documents present and verified: yes      Test results available: yes      Imaging studies available: yes      Immediately prior to procedure a time out was called: yes      Site/side marked: yes      Patient identity confirmed:  Hospital-assigned identification number and arm band  Pre-procedure details:     Procedure purpose:  Diagnostic    Preparation: Patient was prepped and draped in usual sterile fashion    Anesthesia:     Anesthesia method:  Local infiltration    Local anesthetic:  Lidocaine 1% WITH epi  Procedure details:     Lumbar space:  L3-L4 interspace    Patient position:  L lateral decubitus    Needle gauge:  20    Needle type:  Spinal needle - Quincke tip    Ultrasound guidance: yes      Number of attempts:  4    Opening pressure (cm H2O):  24    Fluid appearance:  Clear    Tubes of fluid:  4    Total volume (ml):  6  Post-procedure details:     Puncture site:  Adhesive bandage applied and direct pressure applied    Procedure completion:  Tolerated well, no immediate complications  Comments:      Will keep flat 1 hour after LP, then return to 30 degree angle positioning.

## 2023-10-02 NOTE — H&P
History Of Present Illness  Valentina Fontanez is a 54 yo F with reported T2D (per mother, no records available) who presented to the ED as a trauma activation.     History obtained by mother at bedside as patient is sedated and intubated. Her mother states that she was sleeping when she heard fast, heavy breathing and found her daughter at the bottom of the stairs (~7 steps). She kept calling her daughter's name, but her daughter was making incoherent noises and unresponsive. States that she was staring straight ahead, with slurred speech, thrashing and wet herself, so she called EMS. Prior to this, patient's LKN was Saturday morning when she was downstairs making breakfast (pt lives upstairs, mom downstairs). Pt was not complaining of anything at the time and appeared in her usual state of health. However, mother states that her brother recently  on Thursday and has been feeling down. Denies of depression, suicide attempts, or prior sz. Asked her daughter if she ingested anything, but unable to respond. The mother states that her other brother had concerns that she may be a closet alcoholic after finding multiple wine bottles in her home while helping her move, but the mother states that she has not seen her daughter drinking. Per ED notes, patient has been binge drinking. En route, patient seized and given 5mg IM Versed. POC glucose 563.     Upon arrival to the ED, pt noted to have some decorticate posturing. Pt in cervical collar, on mask ventilation. GCS 4, intubated in the trauma bay (with etomidate 10mg half dose and lawanda 100 per ED note) AC VC 18/450/5 and placed on prop/fent due to agitation. Trauma primary and secondary survey unremarkable, CT imaging without significant injury (see below). No acute trauma surgical intervention required per gen surg note.     ED Course:     Vitals:   T 37.8C (100 F)  P 134  /130  SpO2 100% (bag valve mask)     CBC Hgb 14.9, WBC 14.6, Plt 265  INR 1.1, fibrinogen 430    RFP Na 136, K 3.5, Cl 92, HCO3 13, BUN 21, Cr 1.04, Ca 9.8, albumin 4, glucose 496  AG 31  Alk phos 71, ALT 16, AST 24, Tbili 1, T protein 7.2   Lactate 16.4 > 6 > 2.2 (on VBG )   Hs trop 31    UA 3+ glucose, 2+ protein, 2+ blood, 1+ ketones    VB (prior to intubation) pH 7.17 pCO2 52 lactate 13.3   1717 pH 7.48, pCO2 32, lactate 2.2     Acetaminophen < 10, salicylate <3, alcohol < 10  UTox benzo presumed positive     Images  eFAST  No fluid in the pericardial window     No fluid in the abdomen right upper quadrant, abdomen left upper quadrant, pelvis, right lung and left lung.     CXR No focal pulmonary consolidation or pleural effusions.  PXR: No acute pelvic fx/dislocation  CT head w/o; CTA head and neck w/wo:   No acute large territorial infarct or intracranial hemorrhage.  2. No evidence for significant stenosis or large branch vessel  cutoffs of the intracranial vessels. No evidence of traumatic  arterial injury.  3. No traumatic spondylolisthesis of the cervical spine.  4. Enteric tube coils within the pharynx and terminates within the  right piriform sinus and recommend repositioning.  5. Endotracheal tube with its tip terminating approximately 1.6 cm  above the level of the rubens.    CT Chest/Abd/Pelvis with IV contrast:   Bibasilar pulmonary infiltrates, hepatic steatosis, fibroid uterus, otherwise unremarkable   CT C/T/L spine:   Unremarkable, no acute fx. No traumatic spondylolisthesis of the cervical spine.     KUB:   Enteric tube tip located in the region of the distal esophagus.   This should be advanced.   2. Nonspecific bowel gas pattern. No definite evidence of mechanical   obstruction.    Interventions:   Insulin gtt at 8U/hr (subsequently discontinued due to VBG showing K 2.2)  1L NS  Folic acid 1mg   MVI  Cardene drip 5mg/hr (discontinued due to hypotension)  Hydral 10mg IVP x1  labetolol 10mg IVP x1   blood cx x2  Vanc, Zosyn   20mEq IV KCl x2     Past Medical History  Per  "mother, pt has T2D. otherwise unknown due to lack of records    Surgical History  none     Social History  Retired, previously did clerical work at [employer]   Lives with mother  Per brother, pt is a closet heavy drinker and may have been binge drinking recently i/s/o recent family death but mother denies  Seeing patient drink or issues with alcohol   Denies tobacco use or other drugs     Family History  Mother with CHF and HTN     Allergies  Morphine: swelling      Physical Exam  General: intubated, sedated on prop 50, fent 75  HEENT: atraumatic, normocephalic, ETT in place, internal rotation with neck flexion  CV: tachycardic, no m/r/g   Pulm: b/l mechanical breath sounds, no crackles or wheezes   GI: soft, no grimacing or obvious pain to palpation  Renal: lake with 250cc light yellow urine  Extremities: SCDs, no rashes or edema  Neuro: sedated, does not respond to name, sternal rub, or noxious stimuli, PERRL      Last Recorded Vitals  Blood pressure 127/71, pulse 90, temperature 39.3 °C (102.7 °F), resp. rate 18, height 1.676 m (5' 6\"), weight 66.7 kg (147 lb 0.8 oz), SpO2 100 %.    Relevant Results  Results for orders placed or performed during the hospital encounter of 10/01/23 (from the past 24 hour(s))   Blood Gas Venous Full Panel Unsolicited   Result Value Ref Range    POCT pH, Venous 7.17 (LL) 7.33 - 7.43 pH    POCT pCO2, Venous 52 (H) 41 - 51 mm Hg    POCT pO2, Venous 50 (H) 35 - 45 mm Hg    POCT SO2, Venous 71 45 - 75 %    POCT Oxy Hemoglobin, Venous 68.8 45.0 - 75.0 %    POCT Hematocrit Calculated, Venous 52.0 (H) 36.0 - 46.0 %    POCT Sodium, Venous 132 (L) 136 - 145 mmol/L    POCT Potassium, Venous 6.1 (HH) 3.5 - 5.3 mmol/L    POCT Chloride, Venous 95 (L) 98 - 107 mmol/L    POCT Ionized Calicum, Venous 1.20 1.10 - 1.33 mmol/L    POCT Glucose, Venous 673 (HH) 74 - 99 mg/dL    POCT Lactate, Venous 13.3 (HH) 0.4 - 2.0 mmol/L    POCT Base Excess, Venous -10.0 (L) -2.0 - 3.0 mmol/L    POCT HCO3 Calculated, " Venous 19.0 (L) 22.0 - 26.0 mmol/L    POCT Hemoglobin, Venous 17.3 (H) 12.0 - 16.0 g/dL    POCT Anion Gap, Venous 24.0 10.0 - 25.0 mmol/L    Patient Temperature 37.0 degrees Celsius   Comprehensive Metabolic Panel   Result Value Ref Range    Glucose 496 (HH) 74 - 99 mg/dL    Sodium 136 136 - 145 mmol/L    Potassium 3.5 3.5 - 5.3 mmol/L    Chloride 92 (L) 98 - 107 mmol/L    Bicarbonate 13 (L) 21 - 32 mmol/L    Anion Gap 35 (H) 10 - 20 mmol/L    Urea Nitrogen 21 6 - 23 mg/dL    Creatinine 1.04 0.50 - 1.05 mg/dL    eGFR 64 >60 mL/min/1.73m*2    Calcium 9.8 8.6 - 10.6 mg/dL    Albumin 4.0 3.4 - 5.0 g/dL    Alkaline Phosphatase 71 33 - 110 U/L    Total Protein 7.2 6.4 - 8.2 g/dL    AST 24 9 - 39 U/L    Bilirubin, Total 1.0 0.0 - 1.2 mg/dL    ALT 16 7 - 45 U/L   Alcohol   Result Value Ref Range    Alcohol <10 <=10 mg/dL   Lactate   Result Value Ref Range    Lactate 16.4 (HH) 0.4 - 2.0 mmol/L   Protime-INR   Result Value Ref Range    Protime 12.6 9.8 - 12.8 seconds    INR 1.1 0.9 - 1.1   Troponin I, High Sensitivity   Result Value Ref Range    Troponin I, High Sensitivity 31 0 - 34 ng/L   Fibrinogen   Result Value Ref Range    Fibrinogen 430 (H) 200 - 400 mg/dL   Salicylate level   Result Value Ref Range    Salicylate  <3 4 - 20 mg/dL   Acetaminophen level   Result Value Ref Range    Acetaminophen <10.0 10.0 - 30.0 ug/mL   Lipase   Result Value Ref Range    Lipase 40 9 - 82 U/L   Beta Hydroxybutyrate   Result Value Ref Range    Beta-Hydroxybutyrate 2.71 (H) 0.02 - 0.27 mmol/L   CBC and Auto Differential   Result Value Ref Range    WBC 14.6 (H) 4.4 - 11.3 x10*3/uL    nRBC 0.0 0.0 - 0.0 /100 WBCs    RBC 4.68 4.00 - 5.20 x10*6/uL    Hemoglobin 14.9 12.0 - 16.0 g/dL    Hematocrit 42.5 36.0 - 46.0 %    MCV 91 80 - 100 fL    MCH 31.8 26.0 - 34.0 pg    MCHC 35.1 32.0 - 36.0 g/dL    RDW 11.9 11.5 - 14.5 %    Platelets 265 150 - 450 x10*3/uL    MPV 11.6 (H) 7.5 - 11.5 fL    Neutrophils % 78.7 40.0 - 80.0 %    Immature Granulocytes  %, Automated 1.0 (H) 0.0 - 0.9 %    Lymphocytes % 14.0 13.0 - 44.0 %    Monocytes % 6.2 2.0 - 10.0 %    Eosinophils % 0.0 0.0 - 6.0 %    Basophils % 0.1 0.0 - 2.0 %    Neutrophils Absolute 11.47 (H) 1.20 - 7.70 x10*3/uL    Immature Granulocytes Absolute, Automated 0.15 0.00 - 0.70 x10*3/uL    Lymphocytes Absolute 2.04 1.20 - 4.80 x10*3/uL    Monocytes Absolute 0.91 0.10 - 1.00 x10*3/uL    Eosinophils Absolute 0.00 0.00 - 0.70 x10*3/uL    Basophils Absolute 0.01 0.00 - 0.10 x10*3/uL   Type And Screen   Result Value Ref Range    ABO TYPE AB     Rh TYPE POS     ANTIBODY SCREEN NEG    Blood Gas Venous Full Panel Unsolicited   Result Value Ref Range    POCT pH, Venous 7.43 7.33 - 7.43 pH    POCT pCO2, Venous 36 (L) 41 - 51 mm Hg    POCT pO2, Venous 54 (H) 35 - 45 mm Hg    POCT SO2, Venous      POCT Oxy Hemoglobin, Venous      POCT Hematocrit Calculated, Venous      POCT Sodium, Venous 135 (L) 136 - 145 mmol/L    POCT Potassium, Venous 3.3 (L) 3.5 - 5.3 mmol/L    POCT Chloride, Venous 96 (L) 98 - 107 mmol/L    POCT Ionized Calicum, Venous 1.15 1.10 - 1.33 mmol/L    POCT Glucose, Venous 400 (H) 74 - 99 mg/dL    POCT Lactate, Venous 7.7 (HH) 0.4 - 2.0 mmol/L    POCT Base Excess, Venous -0.1 -2.0 - 3.0 mmol/L    POCT HCO3 Calculated, Venous 23.9 22.0 - 26.0 mmol/L    POCT Hemoglobin, Venous      POCT Anion Gap, Venous 18.0 10.0 - 25.0 mmol/L    Patient Temperature 37.0 degrees Celsius   Blood Gas Venous Full Panel Unsolicited   Result Value Ref Range    POCT pH, Venous 7.44 (H) 7.33 - 7.43 pH    POCT pCO2, Venous 31 (L) 41 - 51 mm Hg    POCT pO2, Venous 63 (H) 35 - 45 mm Hg    POCT SO2, Venous 93 (H) 45 - 75 %    POCT Oxy Hemoglobin, Venous 90.0 (H) 45.0 - 75.0 %    POCT Hematocrit Calculated, Venous 35.0 (L) 36.0 - 46.0 %    POCT Sodium, Venous 131 (L) 136 - 145 mmol/L    POCT Potassium, Venous 2.2 (LL) 3.5 - 5.3 mmol/L    POCT Chloride, Venous 82 (L) 98 - 107 mmol/L    POCT Ionized Calicum, Venous 0.52 (LL) 1.10 - 1.33  mmol/L    POCT Glucose, Venous 490 (HH) 74 - 99 mg/dL    POCT Lactate, Venous 4.4 (HH) 0.4 - 2.0 mmol/L    POCT Base Excess, Venous -2.3 (L) -2.0 - 3.0 mmol/L    POCT HCO3 Calculated, Venous 21.1 (L) 22.0 - 26.0 mmol/L    POCT Hemoglobin, Venous 11.8 (L) 12.0 - 16.0 g/dL    POCT Anion Gap, Venous 30.0 (H) 10.0 - 25.0 mmol/L    Patient Temperature 37.0 degrees Celsius   Blood Culture    Specimen: Peripheral Venipuncture; Blood culture   Result Value Ref Range    Blood Culture Loaded on Instrument - Culture in progress    Lactate   Result Value Ref Range    Lactate 6.0 (HH) 0.4 - 2.0 mmol/L   Urinalysis with Reflex Microscopic   Result Value Ref Range    Color, Urine Straw Straw, Yellow    Appearance, Urine Clear Clear    Specific Gravity, Urine 1.029 1.005 - 1.035    pH, Urine 6.0 5.0, 5.5, 6.0, 6.5, 7.0, 7.5, 8.0    Protein, Urine 100 (2+) (N) NEGATIVE mg/dL    Glucose, Urine >=500 (3+) (A) NEGATIVE mg/dL    Blood, Urine MODERATE (2+) (A) NEGATIVE    Ketones, Urine 20 (1+) (A) NEGATIVE mg/dL    Bilirubin, Urine NEGATIVE NEGATIVE    Urobilinogen, Urine <2.0 <2.0 mg/dL    Nitrite, Urine NEGATIVE NEGATIVE    Leukocyte Esterase, Urine NEGATIVE NEGATIVE   Drug Screen, Urine   Result Value Ref Range    Amphetamine Screen, Urine Presumptive Negative Presumptive Negative    Barbiturate Screen, Urine Presumptive Negative Presumptive Negative    Benzodiazepines Screen, Urine Presumptive Positive (A) Presumptive Negative    Cannabinoid Screen, Urine Presumptive Negative Presumptive Negative    Cocaine Metabolite Screen, Urine Presumptive Negative Presumptive Negative    Fentanyl Screen, Urine Presumptive Negative Presumptive Negative    Opiate Screen, Urine Presumptive Negative Presumptive Negative    Oxycodone Screen, Urine Presumptive Negative Presumptive Negative    PCP Screen, Urine Presumptive Negative Presumptive Negative   Urinalysis Microscopic Only   Result Value Ref Range    WBC, Urine NONE 1-5, NONE /HPF    RBC,  Urine 1-2 NONE, 1-2, 3-5 /HPF   Calcium, Ionized   Result Value Ref Range    POCT Calcium, Ionized 0.51 (LL) 1.1 - 1.33 mmol/L   Blood Gas Venous Full Panel Unsolicited   Result Value Ref Range    POCT pH, Venous 7.48 (H) 7.33 - 7.43 pH    POCT pCO2, Venous 34 (L) 41 - 51 mm Hg    POCT pO2, Venous 79 (H) 35 - 45 mm Hg    POCT SO2, Venous 98 (H) 45 - 75 %    POCT Oxy Hemoglobin, Venous 95.2 (H) 45.0 - 75.0 %    POCT Hematocrit Calculated, Venous 47.0 (H) 36.0 - 46.0 %    POCT Sodium, Venous 134 (L) 136 - 145 mmol/L    POCT Potassium, Venous 3.0 (L) 3.5 - 5.3 mmol/L    POCT Chloride, Venous 100 98 - 107 mmol/L    POCT Ionized Calicum, Venous 1.19 1.10 - 1.33 mmol/L    POCT Glucose, Venous 290 (H) 74 - 99 mg/dL    POCT Lactate, Venous 2.2 (H) 0.4 - 2.0 mmol/L    POCT Base Excess, Venous 2.3 -2.0 - 3.0 mmol/L    POCT HCO3 Calculated, Venous 25.3 22.0 - 26.0 mmol/L    POCT Hemoglobin, Venous 15.8 12.0 - 16.0 g/dL    POCT Anion Gap, Venous 12.0 10.0 - 25.0 mmol/L    Patient Temperature 37.0 degrees Celsius   POCT GLUCOSE   Result Value Ref Range    POCT Glucose 377 (H) 74 - 99 mg/dL   Lactate   Result Value Ref Range    Lactate 2.1 (H) 0.4 - 2.0 mmol/L   Renal function panel   Result Value Ref Range    Glucose 310 (H) 74 - 99 mg/dL    Sodium 136 136 - 145 mmol/L    Potassium 3.4 (L) 3.5 - 5.3 mmol/L    Chloride 98 98 - 107 mmol/L    Bicarbonate 23 21 - 32 mmol/L    Anion Gap 18 10 - 20 mmol/L    Urea Nitrogen 22 6 - 23 mg/dL    Creatinine 1.07 (H) 0.50 - 1.05 mg/dL    eGFR 62 >60 mL/min/1.73m*2    Calcium 9.4 8.6 - 10.6 mg/dL    Phosphorus 2.7 2.5 - 4.9 mg/dL    Albumin 3.4 3.4 - 5.0 g/dL   Magnesium   Result Value Ref Range    Magnesium 1.63 1.60 - 2.40 mg/dL   Osmolality   Result Value Ref Range    Osmolality, Serum 304 (H) 280 - 300 mOsm/kg   Creatine Kinase   Result Value Ref Range    Creatine Kinase 998 (H) 0 - 215 U/L   POCT GLUCOSE   Result Value Ref Range    POCT Glucose 311 (H) 74 - 99 mg/dL   POCT GLUCOSE    Result Value Ref Range    POCT Glucose 327 (H) 74 - 99 mg/dL   POCT GLUCOSE   Result Value Ref Range    POCT Glucose 320 (H) 74 - 99 mg/dL   POCT GLUCOSE   Result Value Ref Range    POCT Glucose 271 (H) 74 - 99 mg/dL        Assessment/Plan     Valentina Fontanez is a 52 yo F with reported T2D (per mother, no records available) who presented to the ED after being found down   by mother this AM. Pt reportedly seized en route to hospital, s/p IM Versed by EMS. GCS 4 in ED, s/p intubation. While in the ED,   pt afebrile, tachycardic, hypertensive. Imaging unremarkable, no acute trauma surgical intervention per gen surgery. Labs notable   for hyperglycemia, AG metabolic acidosis with lactate 13. Alcohol <10, Utox positive only for benzos (likely Versed). Unclear etiology of encephalopathy, possibly 2/2 infectious (i/s/o persistent high fevers) vs withdrawal/intoxication vs metabolic. Low c/f DKA, will dc insulin gtt and start basal-bolus. Continue vanc, start doxy for atypical coverage and acyclovir/CTX for empiric meningitis treatment. Plan to wean sedation as able to assess mentation, consider LP if continues to be altered.     Neuro  #AMS  #New-onset sz   :: Ddx: metabolic, ingestion/withdrawal, infectious (eg, meningitis/encephalitis), hypoxia, fever, PRES, Wernicke's, subclinical status  :: CTH/CTA without e/o acute large territorial infarct or intracranial hemorrhage  :: Utox +benzos (s/p Versed), Acetaminophen < 10, salicylate <3, alcohol < 10  Currently sedated on fent, prop. Wean as able to assess mentation  CIWA protocol. C/w IV thiamine 500mg TID, folic acid 1mg daily   C/w vanc. Dc zosyn. Start doxy, acyclovir, rocephin, dex for empiric meningitis coverage. Consider LP   Tylenol 650mg q4h PRN, cooling blanket for fever   Neurology consulted, appreciate recs   cvEEG  TSH, cortisol, folate, B12, HIV, syphilis     CV  No active problems    Pulm  #Intubated due to GSC 4  #C/f aspiration   SBT as able once mentation  improves  Procal, respiratory viral panel, urine strep and legionella, COVID ordered   On vanc, doxy, CTX, dex, acyclovir    GI  no active problems       #YUMI, likely prerenal i/s/o persistent fevers, hypergylcemia   #CT c/f fibroid uterus  :: unknown baseline Cr        -     s/p 2L IVF       -     add Ulytes        -     follow-up outpt       -     pregnancy test pending     Endo  #Hyperglycemia   #Hypokalemia  Dc insulin gtt   s/p 1L NS, 1L LR   Lantus 6U + SSI, trickle feeds. POC glucose q4h   s/p 40mEq IV K, additional 40 IV K given for repeat K 3.4    ID  #Fever  #Leukocytosis  :: possibly reactive i/s/o DKA vs infectious given fever, AMS vs withdrawal/ingestion  :: CT with bibasilar pulmonary infiltrates, UA non-infectious   Blood cx x2  Procal, respiratory viral panel, urine strep and legionella, COVID ordered   C/w vanc. Dc zosyn. Start doxy, acyclovir, rocephin, dex for empiric meningitis coverage. Consider LP   Tylenol 650mg q4h PRN, cooling blanket for fever     Heme/Onc  No active problems    MSK  #Elevated CK i/s/o recent seizure   s/p 1L NS in ED, 1L LR   continue to trend CK and RFP     F: PRN  E: PRN  N: NPO, trickle feeds ordered   A: PIVs  Valerio, OG     Holding DVT ppx pending need for LP     Code status: Full code (per mother)   NOK: mother Aparna [phone]       Edilma Ascencio MD

## 2023-10-02 NOTE — HOSPITAL COURSE
Her mother states that she was sleeping when she heard fast, heavy breathing and found her daughter at the bottom of the stairs (~7 steps). She kept calling her daughter's name, but her daughter was making incoherent noises and unresponsive. States that she was staring straight ahead, with slurred speech, thrashing and wet herself, so she called EMS. Prior to this, patient's LKN was Saturday morning when she was downstairs making breakfast (pt lives upstairs, mom downstairs). Pt was not complaining of anything at the time and appeared in her usual state of health. However, mother states that her brother recently  on Thursday and has been feeling down. Denies of depression, suicide attempts, or prior sz. Asked her daughter if she ingested anything, but unable to respond. The mother states that her other brother had concerns that she may be a closet alcoholic after finding multiple wine bottles in her home while helping her move, but the mother states that she has not seen her daughter drinking. Per ED notes, patient has been binge drinking. En route, patient seized and given 5mg IM Versed. POC glucose 563.      Upon arrival to the ED, pt noted to have some decorticate posturing. Pt in cervical collar, on mask ventilation. GCS 4, intubated in the trauma bay (with etomidate 10mg half dose and lawanda 100 per ED note) AC VC 18/450/5 and placed on prop/fent due to agitation. Trauma primary and secondary survey unremarkable, CT imaging remarkable for bibasilar infiltrates. No acute trauma surgical intervention required per gen surg note. Neurology consulted for encephalopathy who recommended LP, MRI, cvEEG. LP performed overnight on 10/1 which revealed a viral pattern of CSF but PCR was negative for any organisms. She remained on empiric antimicrobial coverage with vancomycin, ceftriaxone, ampicillin, acyclovir . Weaned off sedation on 10/2.    Patient was extubated on 10/3 and transferred to medicine for continued  management of viral encephalitis. Antivirals were stopped after no organism detected on PCR. On arrival to floors patient was delirious and hypertensive. Her home BP meds were added back (lisinopril and coreg) and her insulin regimen was also titrated to control her hyperglycemia. Antibiotics were stopped after consultation with ID for low concern of any ongoing PNA. She was transferred to [facility] for co-management with leonardo due to delirium/agitation. Her mental status rapidly improved and she was oriented the morning on 10/6 and not complaining of any symptoms. She was deemed stable for discharge on 10/7 with PCP follow up for HTN, DM2, resolution of encephalitis. She also has instructions to follow up with epilepsy clinic for her keppra management.

## 2023-10-02 NOTE — CARE PLAN
Problem: Skin  Goal: Decreased wound size/increased tissue granulation at next dressing change  10/2/2023 1258 by Galdino Kathleen RN  Outcome: Not Progressing  10/2/2023 1257 by Galdino Kathleen RN  Outcome: Not Progressing  Goal: Participates in plan/prevention/treatment measures  10/2/2023 1258 by Galdino Kathleen RN  Outcome: Not Progressing  10/2/2023 1257 by Galdino Kathleen RN  Outcome: Not Progressing  Goal: Prevent/manage excess moisture  10/2/2023 1258 by Galdino Kathleen RN  Outcome: Not Progressing  10/2/2023 1257 by Galdino Kathleen RN  Outcome: Not Progressing  Goal: Prevent/minimize sheer/friction injuries  Outcome: Not Progressing  Goal: Promote/optimize nutrition  Outcome: Not Progressing  Goal: Promote skin healing  Outcome: Not Progressing

## 2023-10-02 NOTE — CONSULTS
Inpatient consult to Neurology  Consult performed by: Mauro Harris MD  Consult ordered by: Daniella Szymanski MD        CC: C/f meningitis and seizure    History Of Present Illness  [employer] Trauma Whiskey (Valentina Fontanez [id number]) 54 y/o F w/ PMH of 54 y/o F w/ PMH of EOTH use and T2DM who was found down at home and reported to have seizure witnessed by EMS for whom neurology was consulted.    The patient is intubated and sedated, hx obtained from mother and chart review    The patient has unclear ETOH use hx; however, there is concern from family for alcoholism and recent binge drinking behavior. The patient has reportedly had depressed mood recently due to death of relative. Per the patient’s mother, she was in her normal state of health with no complaints of recent illness or changes in medication. The patient lives with her mother who had not seen the patient since 9/30. On 10/1, she heard abnormal breathing from the stairs and found the patient lying on the ground and the foot of the steps unable to get up on her own and speaking incoherently. EMS was called and the patient was brought to the hospital. En route, the patient reportedly had seizure like activity for which she was given IM Versed 5mg.    On arrival to the ED, the patient was intubated for airway protection due to AMS (GCS 4), started on prop/fent and admitted to the MICU w/ c/f DKA. Workup notable for Lactate of 16.4, pH 7.17, WBC 14.6, and glucose 496. CTH, CTA h&n, CT C/T/L spine, CT C/A/P unremarkable. On admission to the MICU, the patient was noted to be persistently febrile (Tmax 39.3) and started on Vanc/Zosyn and Ceftriaxone/Acyclovir/Dexamethasone for CNS coverage. Neurology was consulted for new-onset seizure and c/f meningitis.    ROS  Unable to obtain due to patient condition    Past Medical History  - Type 2 DM    Surgical History  History reviewed. No pertinent surgical history.    Family History  No neurological family hx    Social  History  Lives with mom, independent in ADLs and IADLs  Tobacco: mother reports denies  ETOH: mother reports occasional, brother has concern patient is alcoholic  Illicit: mother denies    Home Meds  Metformin    Allergies  Morphine: swelling      Physical Exam:  GENERAL APPEARANCE:  Intubated, sedated, on prop and fent     MENTAL STATE:   Pt intubted and sedated, not verbal. Eyes closed to nox stim.   Recent and remote memory were impaired.  Attention span and concentration were impaired. Language testing was unable to be performed. Does not follow commands.    OPHTHALMOSCOPIC:   Unable to assess due to limited pt cooperation/miotic pupils     CRANIAL NERVES:   CN 2   Difficult to assess due to limited pt cooperation, no clear blink to threat noted.  CN 3, 4, 6   Pupils round, 3 mm in diameter, equally reactive to light. Lids symmetric; no ptosis.   No nystagmus.   CN 5, 7  Corneal reflex present bilaterally.  CN 8   Unable to assess due to pt's impaired mental status   CN 9   Unable to assess as pt is intubated  CN 10  Cough/gag reflexes present.  CN 11   Unable to assess as pt is intubated   CN 12   Unable to assess as pt is intubated    MOTOR:   Muscle bulk and tone were normal in both upper and lower extremities.   No fasciculations, tremor or other abnormal movements were present.   Wd to nox stim in BLE.   No nuchal rigidity on exam. Negative Kernig sign.  On testing for Brudzinski sign, pt noted to have internal rotation of lower extremities (L>R).     REFLEXES:                       R          L  BR:               2         2  Biceps:         2          2  Triceps:        2          2  Knee:           3          3  Ankle:          2          2    Suprapatellar and crossed adductor reflexes present.   Babinski: toes mute to plantar stimulation bilaterally.   No clonus.    SENSORY:   Unable to fully assess due to pt's impaired mental status.  Withdrawal to noxious stimuli in BLE. Localizes to nox stim in  ALAYNA.    COORDINATION:    Unable to assess due to pt's impaired mental status     GAIT:   Unable to assess as pt is intubated      Assessment:  [employer] Whiskey (Valentina Fontnaez [id number]) 52 y/o F w/ PMH of 52 y/o F w/ PMH of EOTH use and T2DM who was found down at home and reported to have seizure witnessed by EMS for whom neurology was consulted. Per mother, pt was found down and reportedly seemed to be confused and incoherent. On arrival to ED, pt was intubated for airway protection and started on prop/fent drips. Workup notable for Lactate of 16.4, pH 7.17, WBC 14.6, and glucose 496. CTH, CTA h&n, CT C/T/L spine, CT C/A/P unremarkable. On admission to the MICU, the patient was noted to be persistently febrile (Tmax 39.3) and started on Vanc/Zosyn and Ceftriaxone/Acyclovir/Dexamethasone for CNS coverage. Given witnessed seizure-like activity, would recommend monitoring with vEEG prior to weaning sedation. No indication for AEDs at this time given it was a one-time event and likely provoked iso possible infection vs DKA. Recurrent fevers iso AMS and seizure is c/f meningitis so would recommend getting a stat LP and starting empiric abx in the interim.     Recommendations:  - Monitoring with cvEEG to assess for epileptiform activity  - Please perform lumbar puncture and send for the following labs:  --cell count and diff in tubes 1 and 4, total protein and glucose  --biofire panel, CSF culture and smear  -Given c/f meningitis, please start the following medications:    - Dexamethasone 10 mg IV Q6H x4 days (30 minutes before or with the first dose of antibiotics)      - Ceftriaxone 2 grams daily Q12H    - Vancomycin 1 gram IV: (15-20 mg/kg/dose Q8H)    - Acyclovir 10-15 mg/kg (ideal body weight) IV Q8H    Mauro Harris MD  PGY-2, Neurology    I have seen and evaluated the patient with the neurology team, I agree with the assessment and pan, which were formulated with my direct input.   Picture is consistent with  meningoencephalitis.  Primitivo Agosto MD PhD

## 2023-10-02 NOTE — CONSULTS
Consults  Referred by Nelly Willson MD: No primary care provider on file.    Reason For Consult  Concern for meningitis    History Of Present Illness  Valentina Fontanez is a 58 y.o. female presenting with unknown PMH found down with GCS 5, protecting airway per EMS, arrived on mask ventilation. ID consulted for concern for meningitis.     Patient presented on 10/1 with being found down. On admission was fevering to 102 with leukocytosis of 14.6.  Patient was intubated for inability to protect airway. Start doxy, acyclovir, rocephin, dex for empiric meningitis coverage.      Past Medical History  She has no past medical history on file.    Surgical History  She has no past surgical history on file.     Social History     Occupational History    Not on file   Tobacco Use    Smoking status: Unknown    Smokeless tobacco: Not on file   Vaping Use    Vaping Use: Unknown   Substance and Sexual Activity    Alcohol use: Yes     Comment: unknown per family    Drug use: Defer    Sexual activity: Defer     Travel History   Travel since 09/02/23    No documented travel since 09/02/23            Pets: unknown  Hobbies: unknown    Family History  No family history on file.  Allergies  Patient has no known allergies.       There is no immunization history on file for this patient.  Medications  Home medications:  No medications prior to admission.     Current medications:  Scheduled medications  acetaminophen, 650 mg, oral, q4h  acyclovir, 675 mg, intravenous, q8h  ampicillin, 2 g, intravenous, q4h  cefTRIAXone, 2 g, intravenous, q12h  dexAMETHasone, 0.15 mg/kg, intravenous, q6h  folic acid, 1 mg, oral, Daily  heparin (porcine), 5,000 Units, subcutaneous, q8h  insulin glargine, 6 Units, subcutaneous, Nightly  insulin lispro, 0-10 Units, subcutaneous, q4h  magnesium sulfate, , ,   multivitamin with minerals, 1 tablet, oral, Daily  [START ON 10/3/2023] oxygen, , inhalation, Continuous - 02/gases  pantoprazole, 40 mg,  "intravenous, Daily  potassium chloride, 40 mEq, intravenous, Once  potassium, sodium phosphates, 1 packet, oral, 4x daily  propofol, , ,   sodium chloride, 1,000 mL, intravenous, Once  thiamine, 500 mg, intravenous, TID  vancomycin, 750 mg, intravenous, q12h      Continuous medications  dexmedeTOMIDine, 0.1-1.5 mcg/kg/hr  fentaNYL,  mcg/hr, Last Rate: 50 mcg/hr (10/02/23 1226)  propofol, 5-50 mcg/kg/min, Last Rate: 40 mcg/kg/min (10/02/23 1235)      PRN medications  PRN medications: dextrose, dextrose, glucagon, magnesium sulfate, propofol    Review of Systems  Review of systems negative due to intubated status    Objective  Range of Vitals (last 24 hours)  Heart Rate:  []   Temp:  [35.9 °C (96.6 °F)-39.4 °C (102.9 °F)]   Resp:  [12-21]   BP: ()/(57-85)   Height:  [167.6 cm (5' 5.98\")]   Weight:  [66.7 kg (147 lb 0.8 oz)]   SpO2:  [96 %-100 %]   Daily Weight  10/02/23 : 66.7 kg (147 lb 0.8 oz)    Body mass index is 23.75 kg/m².     Physical Exam   General: intubated and sedated  HEENT: eyes closed  CVS: RRR. Normal S1 and S2. No m/r/g.   RESP: intubated, vent sounds  Abd: +BS. Soft and lax. ND.   Ext: No swelling of the LE b/l.   Neuro: intubated and sedated  Integumentary: no obvious lesions   Rheumatologic: No joint swelling or edema    Relevant Results    Labs  Results from last 72 hours   Lab Units 10/01/23  1124   WBC AUTO x10*3/uL 14.6*   HEMOGLOBIN g/dL 14.9   HEMATOCRIT % 42.5   PLATELETS AUTO x10*3/uL 265   NEUTROS PCT AUTO % 78.7   LYMPHS PCT AUTO % 14.0   MONOS PCT AUTO % 6.2   EOS PCT AUTO % 0.0     Results from last 72 hours   Lab Units 10/02/23  0254 10/02/23  0206 10/01/23  1946   SODIUM mmol/L 138 138 136   POTASSIUM mmol/L 3.5 3.5 3.4*   CHLORIDE mmol/L 100 101 98   CO2 mmol/L 21 23 23   BUN mg/dL 24* 23 22   CREATININE mg/dL 1.07* 1.09* 1.07*   GLUCOSE mg/dL 142* 143* 310*   CALCIUM mg/dL 9.1 9.2 9.4   ANION GAP mmol/L 21* 18 18   EGFR mL/min/1.73m*2 62 60* 62   PHOSPHORUS mg/dL " "2.4* 2.1* 2.7     Results from last 72 hours   Lab Units 10/02/23  0254 10/02/23  0206 10/01/23  1946 10/01/23  1033   ALK PHOS U/L  --   --   --  71   BILIRUBIN TOTAL mg/dL  --   --   --  1.0   PROTEIN TOTAL g/dL  --   --   --  7.2   ALT U/L  --   --   --  16   AST U/L  --   --   --  24   ALBUMIN g/dL 3.1* 3.0* 3.4 4.0     Estimated Creatinine Clearance: 53 mL/min (A) (by C-G formula based on SCr of 1.07 mg/dL (H)).  No results found for: \"CRP\", \"SEDRATE\"  HIV 1/2 Antigen/Antibody Screen with Reflex to Confirmation   Date Value Ref Range Status   10/01/2023 Nonreactive Nonreactive Final     No results found for: \"HEPCABINIT\", \"HEPCAB\", \"HCVPCRQUANT\"  Microbiology  No results found for the last 14 days.    10/1 BC pending  10/1 MRSA nares positive  10/2 legionella negative  10/2 strep pneumococcosis negative  10/2 CSF WBC 27, 47 protein, 116, 34 neutrophils, 45 lymphocytes  10/2 CSF VDRL  10/2 molecular microbiology  10/2 CSF gram stain rare polymorphonuclear leukocytes, pending culture  10/2 HSV CSF negative    Abx:  Acyclovir (10/1-current)  Ampicillin (10/2-current)  Ceftriaxone (10/2-current)  Doxycyline (10/2-current)      Imaging    Xray abdomen   IMPRESSION:  1.  Enteric tube is unchanged in position with the tip projecting  over proximal stomach and side hole projecting over distal esophagus.  Consider advancing.      Signed by: Waqas Anderson 10/2/2023 1:03 PM  Dictation workstation:   [id number]    CT angio  IMPRESSION:  1. No acute large territorial infarct or intracranial hemorrhage.  2. No evidence for significant stenosis or large branch vessel  cutoffs of the intracranial vessels. No evidence of traumatic  arterial injury.  3. No traumatic spondylolisthesis of the cervical spine.  4. Enteric tube coils within the pharynx and terminates within the  right piriform sinus and recommend repositioning.  5. Endotracheal tube with its tip terminating approximately 1.6 cm  above the level of the " rubens.      CT chest abdomen pelvis  FINDINGS:  Please note that the evaluation of vessels, lymph nodes and organs is  limited without intravenous contrast.   CHEST:  There is an endotracheal tube 2.2 cm above the rubens.  MEDIASTINUM:  The heart is normal in size without pericardial effusion.  LUNGS/PLEURA:  There is no pleural effusion, pleural thickening, or pneumothorax.   The airways are patent.  There are bibasilar pulmonary infiltrates.  LYMPH NODES:  Thoracic lymph nodes are not enlarged.  ABDOMEN:     CT head  IMPRESSION:  1. No acute large territorial infarct or intracranial hemorrhage.  2. No evidence for significant stenosis or large branch vessel  cutoffs of the intracranial vessels. No evidence of traumatic  arterial injury.  3. No traumatic spondylolisthesis of the cervical spine.  4. Enteric tube coils within the pharynx and terminates within the  right piriform sinus and recommend repositioning.  5. Endotracheal tube with its tip terminating approximately 1.6 cm  above the level of the rubens.    Assessment and recommendations:    #Altered mental status  #Fevers    Patient with fever and altered mentation concerning for encephalitis. CSF looks less consistent with bacterial and looks more consistent with viral meningitis. Also with some possibility of aspiration and pneumonia therefore will continue coverage as below.     -Stop ampicillin  -Continue ceftriaxone  -Continue doxycyline  -Continue acyclovir  -Agree with MRI  -Please obtain west nile virus CSF IgM   -Okay to discontinue droplet precautions as concern for bacterial meningitis is low    ID will continue to follow. If any questions regarding this patient please call Team pager.  Discussed with Dr. Danish Brown MD

## 2023-10-03 ENCOUNTER — APPOINTMENT (OUTPATIENT)
Dept: NEUROLOGY | Facility: HOSPITAL | Age: 58
DRG: 097 | End: 2023-10-03
Payer: COMMERCIAL

## 2023-10-03 ENCOUNTER — TRANSCRIBE ORDERS (OUTPATIENT)
Dept: NEUROLOGY | Facility: HOSPITAL | Age: 58
End: 2023-10-03
Payer: COMMERCIAL

## 2023-10-03 DIAGNOSIS — R56.9 SEIZURE (MULTI): Primary | ICD-10-CM

## 2023-10-03 PROBLEM — E13.11: Status: RESOLVED | Noted: 2023-10-01 | Resolved: 2023-10-03

## 2023-10-03 PROBLEM — J69.0 ASPIRATION PNEUMONIA OF BOTH LOWER LOBES (MULTI): Status: ACTIVE | Noted: 2023-10-03

## 2023-10-03 LAB
ALBUMIN SERPL BCP-MCNC: 3 G/DL (ref 3.4–5)
ALBUMIN SERPL BCP-MCNC: 3.1 G/DL (ref 3.4–5)
ALBUMIN SERPL BCP-MCNC: 3.2 G/DL (ref 3.4–5)
ANION GAP SERPL CALC-SCNC: 16 MMOL/L (ref 10–20)
ANION GAP SERPL CALC-SCNC: 18 MMOL/L (ref 10–20)
ANION GAP SERPL CALC-SCNC: 21 MMOL/L (ref 10–20)
BASOPHILS # BLD AUTO: 0.02 X10*3/UL (ref 0–0.1)
BASOPHILS NFR BLD AUTO: 0.1 %
BUN SERPL-MCNC: 23 MG/DL (ref 6–23)
BUN SERPL-MCNC: 24 MG/DL (ref 6–23)
BUN SERPL-MCNC: 29 MG/DL (ref 6–23)
CALCIUM SERPL-MCNC: 9.1 MG/DL (ref 8.6–10.6)
CALCIUM SERPL-MCNC: 9.2 MG/DL (ref 8.6–10.6)
CALCIUM SERPL-MCNC: 9.2 MG/DL (ref 8.6–10.6)
CHLORIDE SERPL-SCNC: 100 MMOL/L (ref 98–107)
CHLORIDE SERPL-SCNC: 101 MMOL/L (ref 98–107)
CHLORIDE SERPL-SCNC: 97 MMOL/L (ref 98–107)
CK SERPL-CCNC: 1019 U/L (ref 0–215)
CK SERPL-CCNC: 632 U/L (ref 0–215)
CO2 SERPL-SCNC: 21 MMOL/L (ref 21–32)
CO2 SERPL-SCNC: 23 MMOL/L (ref 21–32)
CO2 SERPL-SCNC: 24 MMOL/L (ref 21–32)
CREAT SERPL-MCNC: 0.93 MG/DL (ref 0.5–1.05)
CREAT SERPL-MCNC: 1.07 MG/DL (ref 0.5–1.05)
CREAT SERPL-MCNC: 1.09 MG/DL (ref 0.5–1.05)
EOSINOPHIL # BLD AUTO: 0 X10*3/UL (ref 0–0.7)
EOSINOPHIL NFR BLD AUTO: 0 %
ERYTHROCYTE [DISTWIDTH] IN BLOOD BY AUTOMATED COUNT: 11.4 % (ref 11.5–14.5)
GFR SERPL CREATININE-BSD FRML MDRD: 59 ML/MIN/1.73M*2
GFR SERPL CREATININE-BSD FRML MDRD: 60 ML/MIN/1.73M*2
GFR SERPL CREATININE-BSD FRML MDRD: 71 ML/MIN/1.73M*2
GLUCOSE BLD MANUAL STRIP-MCNC: 147 MG/DL (ref 74–99)
GLUCOSE BLD MANUAL STRIP-MCNC: 195 MG/DL (ref 74–99)
GLUCOSE BLD MANUAL STRIP-MCNC: 215 MG/DL (ref 74–99)
GLUCOSE BLD MANUAL STRIP-MCNC: 242 MG/DL (ref 74–99)
GLUCOSE BLD MANUAL STRIP-MCNC: 286 MG/DL (ref 74–99)
GLUCOSE BLD MANUAL STRIP-MCNC: 288 MG/DL (ref 74–99)
GLUCOSE BLD MANUAL STRIP-MCNC: 81 MG/DL (ref 74–99)
GLUCOSE SERPL-MCNC: 142 MG/DL (ref 74–99)
GLUCOSE SERPL-MCNC: 143 MG/DL (ref 74–99)
GLUCOSE SERPL-MCNC: 280 MG/DL (ref 74–99)
HCT VFR BLD AUTO: 35.8 % (ref 36–46)
HGB BLD-MCNC: 11.9 G/DL (ref 12–16)
IMM GRANULOCYTES # BLD AUTO: 0.07 X10*3/UL (ref 0–0.7)
IMM GRANULOCYTES NFR BLD AUTO: 0.5 % (ref 0–0.9)
LYMPHOCYTES # BLD AUTO: 1.52 X10*3/UL (ref 1.2–4.8)
LYMPHOCYTES NFR BLD AUTO: 11.2 %
MAGNESIUM SERPL-MCNC: 2.22 MG/DL (ref 1.6–2.4)
MCH RBC QN AUTO: 30.7 PG (ref 26–34)
MCHC RBC AUTO-ENTMCNC: 33.2 G/DL (ref 32–36)
MCV RBC AUTO: 93 FL (ref 80–100)
MONOCYTES # BLD AUTO: 0.81 X10*3/UL (ref 0.1–1)
MONOCYTES NFR BLD AUTO: 5.9 %
NEUTROPHILS # BLD AUTO: 11.2 X10*3/UL (ref 1.2–7.7)
NEUTROPHILS NFR BLD AUTO: 82.3 %
NRBC BLD-RTO: 0 /100 WBCS (ref 0–0)
PHOSPHATE SERPL-MCNC: 2.1 MG/DL (ref 2.5–4.9)
PHOSPHATE SERPL-MCNC: 2.4 MG/DL (ref 2.5–4.9)
PHOSPHATE SERPL-MCNC: 5.2 MG/DL (ref 2.5–4.9)
PLATELET # BLD AUTO: 188 X10*3/UL (ref 150–450)
PMV BLD AUTO: 12.1 FL (ref 7.5–11.5)
POTASSIUM SERPL-SCNC: 3.5 MMOL/L (ref 3.5–5.3)
POTASSIUM SERPL-SCNC: 3.5 MMOL/L (ref 3.5–5.3)
POTASSIUM SERPL-SCNC: 3.9 MMOL/L (ref 3.5–5.3)
RBC # BLD AUTO: 3.87 X10*6/UL (ref 4–5.2)
SODIUM SERPL-SCNC: 133 MMOL/L (ref 136–145)
SODIUM SERPL-SCNC: 138 MMOL/L (ref 136–145)
SODIUM SERPL-SCNC: 138 MMOL/L (ref 136–145)
VANCOMYCIN TROUGH SERPL-MCNC: 12.5 UG/ML (ref 5–20)
WBC # BLD AUTO: 13.6 X10*3/UL (ref 4.4–11.3)

## 2023-10-03 PROCEDURE — 82550 ASSAY OF CK (CPK): CPT

## 2023-10-03 PROCEDURE — 95720 EEG PHY/QHP EA INCR W/VEEG: CPT | Performed by: PSYCHIATRY & NEUROLOGY

## 2023-10-03 PROCEDURE — 95715 VEEG EA 12-26HR INTMT MNTR: CPT

## 2023-10-03 PROCEDURE — 82947 ASSAY GLUCOSE BLOOD QUANT: CPT

## 2023-10-03 PROCEDURE — 2500000004 HC RX 250 GENERAL PHARMACY W/ HCPCS (ALT 636 FOR OP/ED)

## 2023-10-03 PROCEDURE — C9113 INJ PANTOPRAZOLE SODIUM, VIA: HCPCS

## 2023-10-03 PROCEDURE — S0166 INJ OLANZAPINE 2.5MG: HCPCS

## 2023-10-03 PROCEDURE — 99255 IP/OBS CONSLTJ NEW/EST HI 80: CPT | Performed by: PSYCHIATRY & NEUROLOGY

## 2023-10-03 PROCEDURE — 36415 COLL VENOUS BLD VENIPUNCTURE: CPT

## 2023-10-03 PROCEDURE — 2500000004 HC RX 250 GENERAL PHARMACY W/ HCPCS (ALT 636 FOR OP/ED): Performed by: INTERNAL MEDICINE

## 2023-10-03 PROCEDURE — 99291 CRITICAL CARE FIRST HOUR: CPT

## 2023-10-03 PROCEDURE — 94799 UNLISTED PULMONARY SVC/PX: CPT

## 2023-10-03 PROCEDURE — 96372 THER/PROPH/DIAG INJ SC/IM: CPT

## 2023-10-03 PROCEDURE — 2500000004 HC RX 250 GENERAL PHARMACY W/ HCPCS (ALT 636 FOR OP/ED): Performed by: HOSPITALIST

## 2023-10-03 PROCEDURE — 80069 RENAL FUNCTION PANEL: CPT

## 2023-10-03 PROCEDURE — 86788 WEST NILE VIRUS AB IGM: CPT

## 2023-10-03 PROCEDURE — 1100000001 HC PRIVATE ROOM DAILY

## 2023-10-03 PROCEDURE — 82553 CREATINE MB FRACTION: CPT

## 2023-10-03 PROCEDURE — 2580000001 HC RX 258 IV SOLUTIONS

## 2023-10-03 PROCEDURE — 99232 SBSQ HOSP IP/OBS MODERATE 35: CPT | Performed by: INTERNAL MEDICINE

## 2023-10-03 PROCEDURE — A9575 INJ GADOTERATE MEGLUMI 0.1ML: HCPCS | Performed by: INTERNAL MEDICINE

## 2023-10-03 PROCEDURE — 2500000005 HC RX 250 GENERAL PHARMACY W/O HCPCS: Performed by: INTERNAL MEDICINE

## 2023-10-03 PROCEDURE — 95700 EEG CONT REC W/VID EEG TECH: CPT | Performed by: PSYCHIATRY & NEUROLOGY

## 2023-10-03 PROCEDURE — 2500000001 HC RX 250 WO HCPCS SELF ADMINISTERED DRUGS (ALT 637 FOR MEDICARE OP)

## 2023-10-03 PROCEDURE — 80202 ASSAY OF VANCOMYCIN: CPT

## 2023-10-03 PROCEDURE — 85025 COMPLETE CBC W/AUTO DIFF WBC: CPT

## 2023-10-03 PROCEDURE — 83735 ASSAY OF MAGNESIUM: CPT

## 2023-10-03 PROCEDURE — A4217 STERILE WATER/SALINE, 500 ML: HCPCS

## 2023-10-03 PROCEDURE — 2500000002 HC RX 250 W HCPCS SELF ADMINISTERED DRUGS (ALT 637 FOR MEDICARE OP, ALT 636 FOR OP/ED)

## 2023-10-03 PROCEDURE — 95715 VEEG EA 12-26HR INTMT MNTR: CPT | Performed by: PSYCHIATRY & NEUROLOGY

## 2023-10-03 PROCEDURE — 94003 VENT MGMT INPAT SUBQ DAY: CPT

## 2023-10-03 RX ORDER — CEFTRIAXONE 2 G/50ML
2 INJECTION, SOLUTION INTRAVENOUS EVERY 24 HOURS
Status: DISCONTINUED | OUTPATIENT
Start: 2023-10-04 | End: 2023-10-05

## 2023-10-03 RX ORDER — PHENOBARBITAL SODIUM 65 MG/ML
130 INJECTION, SOLUTION INTRAMUSCULAR; INTRAVENOUS ONCE AS NEEDED
Status: COMPLETED | OUTPATIENT
Start: 2023-10-03 | End: 2023-10-03

## 2023-10-03 RX ORDER — INSULIN LISPRO 100 [IU]/ML
3 INJECTION, SOLUTION INTRAVENOUS; SUBCUTANEOUS
Status: DISCONTINUED | OUTPATIENT
Start: 2023-10-03 | End: 2023-10-04

## 2023-10-03 RX ORDER — PHENOBARBITAL SODIUM 65 MG/ML
32.5 INJECTION, SOLUTION INTRAMUSCULAR; INTRAVENOUS EVERY 8 HOURS
Status: CANCELLED | OUTPATIENT
Start: 2023-10-05 | End: 2023-10-07

## 2023-10-03 RX ORDER — LORAZEPAM 2 MG/ML
2 INJECTION INTRAMUSCULAR ONCE AS NEEDED
Status: DISCONTINUED | OUTPATIENT
Start: 2023-10-03 | End: 2023-10-03

## 2023-10-03 RX ORDER — DEXTROSE MONOHYDRATE 100 MG/ML
0.3 INJECTION, SOLUTION INTRAVENOUS ONCE AS NEEDED
Status: DISCONTINUED | OUTPATIENT
Start: 2023-10-03 | End: 2023-10-07 | Stop reason: HOSPADM

## 2023-10-03 RX ORDER — PETROLATUM 420 MG/G
OINTMENT TOPICAL
Status: COMPLETED
Start: 2023-10-03 | End: 2023-10-03

## 2023-10-03 RX ORDER — MIDAZOLAM HYDROCHLORIDE 1 MG/ML
0.5 INJECTION INTRAMUSCULAR; INTRAVENOUS AS NEEDED
Status: DISCONTINUED | OUTPATIENT
Start: 2023-10-03 | End: 2023-10-03

## 2023-10-03 RX ORDER — PHENOBARBITAL SODIUM 65 MG/ML
65 INJECTION, SOLUTION INTRAMUSCULAR; INTRAVENOUS EVERY 8 HOURS
Status: CANCELLED | OUTPATIENT
Start: 2023-10-03 | End: 2023-10-05

## 2023-10-03 RX ORDER — OLANZAPINE 10 MG/2ML
2.5 INJECTION, POWDER, FOR SOLUTION INTRAMUSCULAR ONCE
Status: COMPLETED | OUTPATIENT
Start: 2023-10-03 | End: 2023-10-03

## 2023-10-03 RX ORDER — WATER 1000 ML/1000ML
INJECTION, SOLUTION INTRAVENOUS
Status: COMPLETED
Start: 2023-10-03 | End: 2023-10-03

## 2023-10-03 RX ORDER — INSULIN GLARGINE 100 [IU]/ML
15 INJECTION, SOLUTION SUBCUTANEOUS NIGHTLY
Status: DISCONTINUED | OUTPATIENT
Start: 2023-10-03 | End: 2023-10-04

## 2023-10-03 RX ADMIN — DEXAMETHASONE SODIUM PHOSPHATE 10 MG: 10 INJECTION, SOLUTION INTRAMUSCULAR; INTRAVENOUS at 01:07

## 2023-10-03 RX ADMIN — HEPARIN SODIUM 5000 UNITS: 5000 INJECTION INTRAVENOUS; SUBCUTANEOUS at 09:33

## 2023-10-03 RX ADMIN — INSULIN LISPRO 6 UNITS: 100 INJECTION, SOLUTION INTRAVENOUS; SUBCUTANEOUS at 11:51

## 2023-10-03 RX ADMIN — PANTOPRAZOLE SODIUM 40 MG: 40 INJECTION, POWDER, FOR SOLUTION INTRAVENOUS at 08:20

## 2023-10-03 RX ADMIN — PHENOBARBITAL SODIUM 130 MG: 65 INJECTION INTRAMUSCULAR at 05:31

## 2023-10-03 RX ADMIN — MIDAZOLAM HYDROCHLORIDE 0.5 MG: 1 INJECTION, SOLUTION INTRAMUSCULAR; INTRAVENOUS at 07:50

## 2023-10-03 RX ADMIN — DEXMEDETOMIDINE HYDROCHLORIDE 1.2 MCG/KG/HR: 400 INJECTION INTRAVENOUS at 09:25

## 2023-10-03 RX ADMIN — SODIUM CHLORIDE, POTASSIUM CHLORIDE, SODIUM LACTATE AND CALCIUM CHLORIDE 1000 ML: 600; 310; 30; 20 INJECTION, SOLUTION INTRAVENOUS at 11:51

## 2023-10-03 RX ADMIN — Medication 100 MCG/HR: at 02:43

## 2023-10-03 RX ADMIN — ACETAMINOPHEN 650 MG: 325 TABLET, FILM COATED ORAL at 01:08

## 2023-10-03 RX ADMIN — THIAMINE HYDROCHLORIDE 500 MG: 100 INJECTION, SOLUTION INTRAMUSCULAR; INTRAVENOUS at 11:17

## 2023-10-03 RX ADMIN — DOXYCYCLINE 100 MG: 100 INJECTION, POWDER, LYOPHILIZED, FOR SOLUTION INTRAVENOUS at 06:30

## 2023-10-03 RX ADMIN — THIAMINE HYDROCHLORIDE 500 MG: 100 INJECTION, SOLUTION INTRAMUSCULAR; INTRAVENOUS at 15:27

## 2023-10-03 RX ADMIN — THIAMINE HYDROCHLORIDE 500 MG: 100 INJECTION, SOLUTION INTRAMUSCULAR; INTRAVENOUS at 20:46

## 2023-10-03 RX ADMIN — ACETAMINOPHEN 650 MG: 325 TABLET, FILM COATED ORAL at 20:47

## 2023-10-03 RX ADMIN — INSULIN LISPRO 8 UNITS: 100 INJECTION, SOLUTION INTRAVENOUS; SUBCUTANEOUS at 01:09

## 2023-10-03 RX ADMIN — LEVETIRACETAM 750 MG: 500 INJECTION, SOLUTION INTRAVENOUS at 22:06

## 2023-10-03 RX ADMIN — WATER 2.1 ML: 1 INJECTION INTRAMUSCULAR; INTRAVENOUS; SUBCUTANEOUS at 09:28

## 2023-10-03 RX ADMIN — OLANZAPINE 2.5 MG: 10 INJECTION, POWDER, FOR SOLUTION INTRAMUSCULAR at 09:18

## 2023-10-03 RX ADMIN — DOXYCYCLINE 100 MG: 100 INJECTION, POWDER, LYOPHILIZED, FOR SOLUTION INTRAVENOUS at 18:30

## 2023-10-03 RX ADMIN — CEFTRIAXONE SODIUM 2 G: 2 INJECTION, SOLUTION INTRAVENOUS at 01:41

## 2023-10-03 RX ADMIN — INSULIN GLARGINE 15 UNITS: 100 INJECTION, SOLUTION SUBCUTANEOUS at 20:48

## 2023-10-03 RX ADMIN — INSULIN LISPRO 4 UNITS: 100 INJECTION, SOLUTION INTRAVENOUS; SUBCUTANEOUS at 08:21

## 2023-10-03 RX ADMIN — PETROLATUM: 1 OINTMENT TOPICAL at 15:00

## 2023-10-03 RX ADMIN — INSULIN LISPRO 6 UNITS: 100 INJECTION, SOLUTION INTRAVENOUS; SUBCUTANEOUS at 04:04

## 2023-10-03 RX ADMIN — HEPARIN SODIUM 5000 UNITS: 5000 INJECTION INTRAVENOUS; SUBCUTANEOUS at 16:54

## 2023-10-03 RX ADMIN — Medication 2 L/MIN: at 08:00

## 2023-10-03 RX ADMIN — ACYCLOVIR SODIUM 675 MG: 50 INJECTION, SOLUTION INTRAVENOUS at 02:42

## 2023-10-03 RX ADMIN — VANCOMYCIN HYDROCHLORIDE 750 MG: 750 INJECTION, SOLUTION INTRAVENOUS at 08:20

## 2023-10-03 RX ADMIN — ACYCLOVIR SODIUM 675 MG: 50 INJECTION, SOLUTION INTRAVENOUS at 10:06

## 2023-10-03 RX ADMIN — HEPARIN SODIUM 5000 UNITS: 5000 INJECTION INTRAVENOUS; SUBCUTANEOUS at 01:09

## 2023-10-03 RX ADMIN — GADOTERATE MEGLUMINE 13.5 ML: 376.9 INJECTION INTRAVENOUS at 00:48

## 2023-10-03 RX ADMIN — INSULIN LISPRO 4 UNITS: 100 INJECTION, SOLUTION INTRAVENOUS; SUBCUTANEOUS at 15:27

## 2023-10-03 RX ADMIN — DEXMEDETOMIDINE HYDROCHLORIDE 0.72 MCG/KG/HR: 400 INJECTION INTRAVENOUS at 04:03

## 2023-10-03 RX ADMIN — INSULIN LISPRO 3 UNITS: 100 INJECTION, SOLUTION INTRAVENOUS; SUBCUTANEOUS at 17:53

## 2023-10-03 RX ADMIN — LEVETIRACETAM 750 MG: 500 INJECTION, SOLUTION INTRAVENOUS at 09:00

## 2023-10-03 SDOH — HEALTH STABILITY: MENTAL HEALTH: EXPERIENCED ANY OF THE FOLLOWING LIFE EVENTS: LIFE-THREATENING ILLNESS OR INJURY;SERIOUS ACCIDENT AT HOME OR WORK

## 2023-10-03 SDOH — SOCIAL STABILITY: SOCIAL INSECURITY: ARE THERE ANY APPARENT SIGNS OF INJURIES/BEHAVIORS THAT COULD BE RELATED TO ABUSE/NEGLECT?: NO

## 2023-10-03 SDOH — SOCIAL STABILITY: SOCIAL INSECURITY: DOES ANYONE TRY TO KEEP YOU FROM HAVING/CONTACTING OTHER FRIENDS OR DOING THINGS OUTSIDE YOUR HOME?: NO

## 2023-10-03 SDOH — SOCIAL STABILITY: SOCIAL INSECURITY: DO YOU FEEL ANYONE HAS EXPLOITED OR TAKEN ADVANTAGE OF YOU FINANCIALLY OR OF YOUR PERSONAL PROPERTY?: NO

## 2023-10-03 SDOH — SOCIAL STABILITY: SOCIAL INSECURITY: DO YOU FEEL UNSAFE GOING BACK TO THE PLACE WHERE YOU ARE LIVING?: NO

## 2023-10-03 SDOH — SOCIAL STABILITY: SOCIAL INSECURITY: HAVE YOU HAD THOUGHTS OF HARMING ANYONE ELSE?: NO

## 2023-10-03 SDOH — SOCIAL STABILITY: SOCIAL INSECURITY: HAS ANYONE EVER THREATENED TO HURT YOUR FAMILY OR YOUR PETS?: NO

## 2023-10-03 SDOH — SOCIAL STABILITY: SOCIAL INSECURITY: ARE YOU OR HAVE YOU BEEN THREATENED OR ABUSED PHYSICALLY, EMOTIONALLY, OR SEXUALLY BY ANYONE?: NO

## 2023-10-03 ASSESSMENT — PAIN SCALES - GENERAL
PAINLEVEL_OUTOF10: 0 - NO PAIN
PAINLEVEL_OUTOF10: 4

## 2023-10-03 ASSESSMENT — COGNITIVE AND FUNCTIONAL STATUS - GENERAL
MOVING FROM LYING ON BACK TO SITTING ON SIDE OF FLAT BED WITH BEDRAILS: A LITTLE
EATING MEALS: A LITTLE
DRESSING REGULAR UPPER BODY CLOTHING: A LITTLE
STANDING UP FROM CHAIR USING ARMS: A LITTLE
CLIMB 3 TO 5 STEPS WITH RAILING: A LOT
WALKING IN HOSPITAL ROOM: A LITTLE
TURNING FROM BACK TO SIDE WHILE IN FLAT BAD: A LITTLE
TOILETING: A LITTLE
MOVING TO AND FROM BED TO CHAIR: A LITTLE
HELP NEEDED FOR BATHING: A LITTLE
PATIENT BASELINE BEDBOUND: NO
DAILY ACTIVITIY SCORE: 18
PATIENT BASELINE BEDBOUND: UNABLE TO ASSESS AT THIS TIME
DRESSING REGULAR LOWER BODY CLOTHING: A LITTLE
PERSONAL GROOMING: A LITTLE
MOBILITY SCORE: 17

## 2023-10-03 ASSESSMENT — ACTIVITIES OF DAILY LIVING (ADL)
WALKS IN HOME: UNABLE TO ASSESS
JUDGMENT_ADEQUATE_SAFELY_COMPLETE_DAILY_ACTIVITIES: YES
JUDGMENT_ADEQUATE_SAFELY_COMPLETE_DAILY_ACTIVITIES: UNABLE TO ASSESS
ASSISTIVE_DEVICE: EYEGLASSES
ASSISTIVE_DEVICE: OTHER (COMMENT)
GROOMING: INDEPENDENT
WALKS IN HOME: INDEPENDENT
HEARING - RIGHT EAR: UNABLE TO ASSESS
HEARING - RIGHT EAR: FUNCTIONAL
GROOMING: UNABLE TO ASSESS
PATIENT'S MEMORY ADEQUATE TO SAFELY COMPLETE DAILY ACTIVITIES?: YES
PATIENT'S MEMORY ADEQUATE TO SAFELY COMPLETE DAILY ACTIVITIES?: UNABLE TO ASSESS
BATHING: INDEPENDENT
ADEQUATE_TO_COMPLETE_ADL: UNABLE TO ASSESS
DRESSING YOURSELF: UNABLE TO ASSESS
BATHING: UNABLE TO ASSESS
HEARING - LEFT EAR: FUNCTIONAL
DRESSING YOURSELF: INDEPENDENT
TOILETING: UNABLE TO ASSESS
TOILETING: INDEPENDENT
ADEQUATE_TO_COMPLETE_ADL: YES
FEEDING YOURSELF: UNABLE TO ASSESS
HEARING - LEFT EAR: UNABLE TO ASSESS
FEEDING YOURSELF: INDEPENDENT

## 2023-10-03 ASSESSMENT — LIFESTYLE VARIABLES
PRESCIPTION_ABUSE_PAST_12_MONTHS: NO
HOW OFTEN DO YOU HAVE A DRINK CONTAINING ALCOHOL: NEVER
SKIP TO QUESTIONS 9-10: 1
AUDIT-C TOTAL SCORE: 0
HOW OFTEN DO YOU HAVE 6 OR MORE DRINKS ON ONE OCCASION: NEVER
HOW MANY STANDARD DRINKS CONTAINING ALCOHOL DO YOU HAVE ON A TYPICAL DAY: PATIENT DOES NOT DRINK
SUBSTANCE_ABUSE_PAST_12_MONTHS: NO
AUDIT-C TOTAL SCORE: 0

## 2023-10-03 ASSESSMENT — PATIENT HEALTH QUESTIONNAIRE - PHQ9
1. LITTLE INTEREST OR PLEASURE IN DOING THINGS: NOT AT ALL
SUM OF ALL RESPONSES TO PHQ9 QUESTIONS 1 & 2: 0
2. FEELING DOWN, DEPRESSED OR HOPELESS: NOT AT ALL

## 2023-10-03 ASSESSMENT — PAIN SCALES - PAIN ASSESSMENT IN ADVANCED DEMENTIA (PAINAD)
BODYLANGUAGE: RELAXED
BREATHING: NORMAL
FACIALEXPRESSION: SMILING OR INEXPRESSIVE
CONSOLABILITY: NO NEED TO CONSOLE
TOTALSCORE: 0

## 2023-10-03 ASSESSMENT — PAIN SCALES - WONG BAKER
WONGBAKER_NUMERICALRESPONSE: NO HURT
WONGBAKER_NUMERICALRESPONSE: NO HURT

## 2023-10-03 ASSESSMENT — PAIN - FUNCTIONAL ASSESSMENT
PAIN_FUNCTIONAL_ASSESSMENT: 0-10
PAIN_FUNCTIONAL_ASSESSMENT: CPOT (CRITICAL CARE PAIN OBSERVATION TOOL)
PAIN_FUNCTIONAL_ASSESSMENT: CPOT (CRITICAL CARE PAIN OBSERVATION TOOL)

## 2023-10-03 NOTE — SIGNIFICANT EVENT
Neurology Final Recommendations and Signoff:  EEG now with decreasing epileptogenicity after administration of Keppra. MRI with moderate subcortical white matter disease. There are no mesial temporal/orbitofrontal lesions or microhemorrhages which are characteristic of HSV encephalitis.    ===  Assessment:  Valentina Fontanez is a 58 y.o. female who is admitted to the MICU with likely viral encephalitis and seizures, for which neurology has been following. EEG with new focal epileptogenic lesions, but the epileptogenicity has significantly improved with LEV.    Impression: Likely Viral Encephalitis    Recommendations:  - continue Keppra 750mg BID (either IV or enteral). Stay on this regimen until neurology outpatient followup, which should be scheduled for 3 months post discharge.   - continue cvEEG. If negative for seizure through tomorrow (10/4)'s final read, can discontinue then.  - defer antibiotic/antiviral recs to our ID colleagues.      Thank you for this consult. Neurology will sign off. Please do not hesitate to reach out to our team by page or secure chat with any questions you may have. Patient was staffed with the attending physician Dr. Primitivo Agosto, who agrees w/ the assessment and plan.    Abdoulaye Lee MD PGY-3  [facility] Inpatient Neurology Team  General Neurology Pager [phone]

## 2023-10-03 NOTE — SIGNIFICANT EVENT
Neurology Post-Rounding Updates:  Patient seen with consult team this morning. Exam nonfocal but limited by sedation. CSF strongly suggestive of viral meningoencephalitis. HSV PCR negative. I personally read/reviewed the cvEEG which shows slowing and P7>O1 spikes.    ==  Assessment:  Valentina Fontanez is a 58 y.o. female who is admitted to the MICU with fever, seizures, and lactic acidosis for which neurology is following. Objective data including exam, CSF, and EEG are strongly concerning for viral encephalitis. It is very possible the HSV-PCR represents a false negative, which is well-reported in the early stages of HSV Encephalitis. Other viral encephalitides are also very possible and merely statistically less-likely. Recommend continued workup and treatment for encephalitis as below.    Impression: Likely Viral Encephalitis    Recommendations:  - start Keppra 750mg IVPB BID  - remain on cvEEG. Ok to remove leads for MRI; please replace them afterward.  - obtain MRI w/wo Contrast  - continue acyclovir  - agree with West Nile CSF IgM  - defer any antibiotic recs to ID  - rest of care per MICU      Thank you for this consult. Neurology will continue to follow. Please do not hesitate to reach out to our team by page or secure chat with any questions you may have. Patient was seen, examined, and discussed with the attending physician Dr. Primitivo Agosto, who agrees w/ the assessment and plan.    Abdoulaye Lee MD PGY-3  [facility] Inpatient Neurology Team  General Neurology Pager [phone]

## 2023-10-03 NOTE — PROGRESS NOTES
Physical Therapy                 Therapy Communication Note    Patient Name: Valentina Fontanez  MRN: [id number]  Today's Date: 10/3/2023     Discipline: Physical Therapy    Missed Visit Reason: Missed Visit Reason:  (easily agitation, requiring sedation; PT will re-attempt as time and schedule allows and as medically appropriate.)    Missed Time: Attempt    Comment:

## 2023-10-03 NOTE — PROGRESS NOTES
I performed a sepsis reperfusion exam on the patient on 10/1/2023 at 1400 pm    Improving mental status. Repeat lactate improving. No obvious source yet detected.     Brinda Harris MD

## 2023-10-03 NOTE — PROGRESS NOTES
Valentina Fontanez is a 58 y.o. female on day 2 of admission presenting with Diabetic ketoacidosis with coma associated with other specified diabetes mellitus (CMS/HCC).    Subjective   Overnight patient was very agitated, attempting to pull out her ETT. She received two doses of midazolam and another bolus and infusion of propofol before going down for her MRI. Later in the night she broke through the sedation. She was placed on precedex and then extubated. For concerns of alcohol withdrawal syndrome she was given a dose of phenobarbital 130mg as well.    Also of note, cvEEG showed epileptiform activity of the left parietal lobe. She was started on Keppra 750mg IV BID.    Upon examination this morning she is heavily sedated, unarousable but withdrawing to pain in all 4 extremities initially. Called to the bedside again this morning for acute agitation. She was yelling that nobody around her was real and that she wanted to leave. She was attempting to get out of bed and to strike staff around her. 0.5mg midazolam IV given with resolution of agitation. Required another dose of Zyprexa. Reportedly she spoke to her mom over the phone who was able to reorient and calm her down.    Active Medications  acetaminophen, 650 mg, oral, q4h  cefTRIAXone, 2 g, intravenous, q12h  doxycycline, 100 mg, intravenous, q12h  folic acid, 1 mg, oral, Daily  heparin (porcine), 5,000 Units, subcutaneous, q8h  insulin glargine, 10 Units, subcutaneous, Nightly  insulin lispro, 0-10 Units, subcutaneous, q4h  lactated Ringer's, 1,000 mL, intravenous, Once  levETIRAcetam, 750 mg, intravenous, q12h  multivitamin with minerals, 1 tablet, oral, Daily  oxygen, , inhalation, Continuous - 02/gases  potassium chloride, 40 mEq, intravenous, Once  sodium chloride, 1,000 mL, intravenous, Once  thiamine, 500 mg, intravenous, TID         Review of Systems   Unable to perform ROS: Mental status change          Objective     Physical Exam  Constitutional:        "Comments: Alternating between sedated and very agitated   HENT:      Head: Normocephalic and atraumatic.   Eyes:      Conjunctiva/sclera: Conjunctivae normal.      Pupils: Pupils are equal, round, and reactive to light.   Cardiovascular:      Rate and Rhythm: Regular rhythm. Bradycardia present.      Pulses: Normal pulses.      Heart sounds: Normal heart sounds.   Pulmonary:      Effort: Pulmonary effort is normal. No respiratory distress.      Breath sounds: Rhonchi present.   Abdominal:      General: Abdomen is flat. There is no distension.      Palpations: Abdomen is soft.      Tenderness: There is no abdominal tenderness.   Genitourinary:     Comments: Valerio in place  Musculoskeletal:      Right lower leg: No edema.      Left lower leg: No edema.   Neurological:      Comments: PERRL. When aroused, moving all four extremities and tracking with eyes appropriately. Able to participate in short conversations.         Last Recorded Vitals  Blood pressure (!) 134/91, pulse 54, temperature 35 °C (95 °F), resp. rate 13, height 1.676 m (5' 5.98\"), weight 68.6 kg (151 lb 3.8 oz), SpO2 93 %.  Intake/Output last 3 Shifts:  I/O last 3 completed shifts:  In: 3638.9 (53 mL/kg) [I.V.:511.9 (7.5 mL/kg); NG/GT:45; IV Piggyback:3082]  Out: 2090 (30.5 mL/kg) [Urine:2090 (0.8 mL/kg/hr)]  Weight: 68.6 kg     Relevant Results  Results from last 7 days   Lab Units 10/03/23  0411   WBC AUTO x10*3/uL 13.6*   HEMOGLOBIN g/dL 11.9*   HEMATOCRIT % 35.8*   PLATELETS AUTO x10*3/uL 188      Results from last 7 days   Lab Units 10/03/23  0411   SODIUM mmol/L 133*   POTASSIUM mmol/L 3.9   CHLORIDE mmol/L 97*   CO2 mmol/L 24   BUN mg/dL 29*   CREATININE mg/dL 0.93   GLUCOSE mg/dL 280*   CALCIUM mg/dL 9.2      MRI Brain w/ and w/o contrast    Result Date: 10/2/2023    IMPRESSION:  1. No acute intracranial abnormality and no abnormal intracranial  enhancement or mass. No mass effect.  2. Chronic intracranial findings as above including findings " of  probable chronic small vessel ischemic changes and probable old small  lacunar infarcts.    CT cervical/thoracic/lumbar spine wo IV contrast, CT chest/abdomen/pelvis    Result Date: 10/1/2023      Bibasilar pulmonary infiltrates. Hepatic steatosis. Fibroid uterus. Otherwise unremarkable CT scan of the chest, abdomen, pelvis, thoracic and lumbosacral spines. There are no acute fractures.       CT head W O contrast trauma protocol    Result Date: 10/1/2023    1. No acute large territorial infarct or intracranial hemorrhage. 2. No evidence for significant stenosis or large branch vessel cutoffs of the intracranial vessels. No evidence of traumatic arterial injury. 3. No traumatic spondylolisthesis of the cervical spine. 4. Enteric tube coils within the pharynx and terminates within the right piriform sinus and recommend repositioning. 5. Endotracheal tube with its tip terminating approximately 1.6 cm above the level of the rubens.                  Assessment/Plan   Valentina Fontanez is a 54 yo F with reported T2D (per mother, no records available) who presented to the ED after being found down   by mother this AM. Pt reportedly seized en route to hospital, s/p IM Versed by EMS. GCS 4 in ED, s/p intubation. While in the ED, pt afebrile, tachycardic, hypertensive. Imaging unremarkable, no acute trauma surgical intervention per gen surgery. Labs notable for hyperglycemia, AG metabolic acidosis with lactate 13. Alcohol <10, Utox positive only for benzos (likely Versed). Unclear etiology of encephalopathy, possibly 2/2 infectious (i/s/o persistent high fevers) vs withdrawal/intoxication vs metabolic. Low c/f DKA, will dc insulin gtt and start basal-bolus. Continue vanc, start doxy for atypical coverage and acyclovir/CTX for empiric meningitis treatment. Plan to wean sedation as able to assess mentation. Investigation of seizure activity with MRI/EEG.    Neuro:  #Acute encephalopathy  #Reported seizures  #C/f  meningoencephalitis  #Alcohol use disorder  ::Encephalopathy, seizures possibly secondary to viral encephalitis vs substance withdrawal  ::CT Head, CTA Head/Neck, MRI Brain negative for acute abnormalities  ::cvEEG showing epileptiform activity over left parietal lobe  ::Persistently febrile on admission  ::LP done, lab work demonstrating WBC count of 27, protein 47, glucose 116, lymphocytes 45%  ::Meningitis PCR panel negative including HSV negative  ::Persistent agitation this morning  -West Nile IgM and IgG ordered  -cvEEG monitoring still going  -Keppra 750mg IV BID per Neuro  -Empiric abx coverage with doxycycline, ceftriaxone  -Acyclovir and vancomycin to be discontinued per ID  -Tylenol PRN for fever  -Neuro and ID consulted, appreciate recs    #Sedation requirements  #Acute agitation/delirium  ::Possibly iatrogenic in setting of alternating propofol/precedex drips and midazolam/phenobarbital pushes  ::Reportedly reoriented by mother this morning  -Zyprexa PRN for agitation. Qtc monitoring on telemetry  -On precedex drip, weaning as able    #Suspected alcohol use disorder  -CIWA protocol, phenobarbital taper  -IV Thiamine 500mg TID    CV:  #Hypertension  ::Initial blood pressures as high as 239/127  ::S/p nicardipine drip, now normotensive  -Blood pressures have dropped to normotension  -Hydralazine PRN for hypertension    Pulm:  #Acute hypoxic respiratory failure  #C/f aspiration pneumonia  ::S/p extubation on early 10/3, now on 4LNC  ::Unclear cause, possibly secondary to aspiration pneumonia vs aspiration pneumonitis  ::Right basilar infiltrates seen on CT Chest consistent with aspiration  ::Legionella, Strep Ag tests negative  ::Procal of 0.41  ::MRSA nares+  ::Blood cultures NGTD  -Being covered with doxycycline, ceftriaxone  -Wean oxygen as able    GI:  #Nutrition  -Pending post-extubation bedside swallow eval    Renal:  #YUMI, improving  ::Reported, although no known baseline  ::Likely prerenal in  setting of infection, seizures  ::Urine lytes showing FENa of <.01%  ::Current Cr down to 0.93 from 1.07 following fluids  -Continue to trend RFPs  -Valerio to be removed. Monitor I/O's  -Avoid nephrotoxic medications    Infectious Disease:  #C/f meningoencephalitis  #C/f aspiration pneumonia  ::Abx day #3 (10/1-*)  -Ceftriaxone 2g daily  -Doxycycline 100mg BID    Endo:  #Hyperglycemia  -Low concern for DKA/HHS  -Blood glucose elevated, continuing to titrate insulin  -Lantus 10u daily, moderate SSI    MSK  #C/f rhabdomyolysis  ::CK likely elevated in setting of seizure  ::CK trending up to 1019 from 881  -1L LR bolus ordered today  -Continue trending    Ventilator: None  Electrolytes: replete PRN  Access/Lines/Tubes: PIVx3, Valerio  Abx: Ceftriaxone, doxycycline (10/1-*)  Drips: Precedex  PPX: subcutaneous heparin  Code status: FULL CODE  NOK: Aparna (mother) [phone]         Wan Wright MD

## 2023-10-03 NOTE — CONSULTS
Vancomycin Dosing by Pharmacy- Cessation of Therapy    Consult to pharmacy for vancomycin dosing has been discontinued by the prescriber, pharmacy will sign off at this time.    Please call pharmacy if there are further questions or re-enter a consult if vancomycin is resumed.     Alondra Khoury, ArielD

## 2023-10-03 NOTE — PROGRESS NOTES
Occupational Therapy                 Therapy Communication Note    Patient Name: Valentina Fontanez  MRN: [id number]  Today's Date: 10/3/2023     Discipline: Occupational Therapy    Missed Visit Reason: Missed Visit Reason:  (patient with increased agitation requiring sedation, not appropriate for OT at this time; will attempt OT next visit as appropriate)    Missed Time: Attempt    Comment:

## 2023-10-03 NOTE — SIGNIFICANT EVENT
Preliminary EEG read:     This video EEG is indicative of severe diffuse encephalopathy. There are frequent epileptiform discharges coming from the left parietal lobe. No seizures were recorded.     Video EEG was reviewed up to 1730 on 10/2/23. Please see tomorrows EEG database for final report.       Ariane Lee DO   Epilepsy fellow

## 2023-10-03 NOTE — NURSING NOTE
Documenting start on restraint type wilBehavioral Restraint / Seclusion Face to Face Assessment    Patient Name:         Valentina Fontanez  YOB: 1965  Medical Record #:   [id number]      Time Restraints were placed:      Date Assessment was completed: 10/3/2023    Time patient was assessed:  10/2/23     Description of behavior causing restraint/seclusion: combative and striking out at staff or others    Type of intervention: Physical restraint (holding)    Patient's immediate situation: no signs of physical distress, no signs of psychological distress, and aggressive    Alternatives Attempted: Alternatives attempted and have been ineffective.    Contraindications for Restraints: Reviewed contraindications for continued restraint use and agree to on-going need.    Patent's reaction to intervention: continues to be agitated    Patient's medical condition: vital signs stable, normal circulation and breathing, skin is protected, and hydration and nutrition are addressed    Patient's behavioral condition: continues to display agitated, threatening, or violent behavior to others    Plan: Continue restraints  l add restraints care plan and patient education.

## 2023-10-03 NOTE — PROGRESS NOTES
Valentina Fontanez is a 58 y.o. female on day 2 of admission presenting with Altered mental status.    Subjective   Interval History: No fevers. Patient is extubated        Review of Systems  Review of systems unable to be obtained as patient was agitated     Objective   Range of Vitals (last 24 hours)  Heart Rate:  [44-99]   Temp:  [35 °C (95 °F)-36.3 °C (97.3 °F)]   Resp:  [12-26]   BP: ()/()   Weight:  [68.6 kg (151 lb 3.8 oz)]   SpO2:  [93 %-100 %]   Daily Weight  10/03/23 : 68.6 kg (151 lb 3.8 oz)    Body mass index is 24.42 kg/m².    Physical Exam  General: agitated  HEENT: no conjunctival injection. anicteric.  RESP extubated, no resp distress  Ext: No swelling of the LE b/l.   Neuro: agitated  Integumentary: no obvious lesions   Rheumatologic: No joint swelling or edema    Antibiotics  etomidate (Amidate) injection  rocuronium (ZeMuron) injection  sodium chloride 0.9 % bolus  propofol (Diprivan) infusion  insulin regular (HumuLIN, NovoLIN) bolus from bag 8 Units  insulin regular 100 unit/100 mL (1 unit/mL) in 0.9 % NaCl infusion  labetaloL (Normodyne,Trandate) injection 10 mg  etomidate (Amidate) 2 mg/mL injection  - Omnicell Override Pull  rocuronium (ZeMuron) 10 mg/mL injection  - Omnicell Override Pull  propofol (Diprivan) 10 mg/mL injection  - Omnicell Override Pull  insulin regular 100 unit/100 mL (1 unit/mL) in 0.9 % NaCl infusion  - Omnicell Override Pull  labetaloL (Normodyne,Trandate) 5 mg/mL injection  - Omnicell Override Pull  oxygen (O2) therapy  iohexol (OMNIPaque) 350 mg iodine/mL injection 130 mL  niCARdipine (Cardene) 40 mg in sodium chloride 200 mL (0.2 mg/mL) infusion (premix)  hydrALAZINE (Apresoline) injection 10 mg  potassium chloride IV 20 mEq  sodium chloride 0.9 % bolus 1,000 mL  piperacillin-tazobactam-dextrose (Zosyn) IV 4.5 g  vancomycin in dextrose 5 % (Vancocin) IVPB 2 g  folic acid (Folvite) tablet 1 mg  multivitamin with minerals 1 tablet  fentanyl (Sublimaze) 1000 mcg in  sodium chloride 0.9% 100 mL (10 mcg/mL) infusion (premix)  potassium chloride IV 40 mEq  propofol (Diprivan) 10 mg/mL injection  - Omnicell Override Pull  oxygen (O2) therapy  propofol (Diprivan) injection  thiamine (Vitamin B1) 500 mg in dextrose 5% 100 mL IV  lactated Ringer's bolus 1,000 mL  potassium chloride IV 20 mEq  acetaminophen (Tylenol) tablet 650 mg  piperacillin-tazobactam-dextrose (Zosyn) IV 4.5 g  cefTRIAXone (Rocephin) 2 g IV in dextrose 5% 50 mL  acyclovir (Zovirax) 670 mg in dextrose 5% 100 mL IV  doxycycline (Vibramycin) in dextrose 5 % in water (D5W) 100 mL  mg  dextrose 50 % injection 25 g  glucagon (Glucagen) injection 1 mg  dextrose 10 % infusion  insulin lispro (HumaLOG) injection 0-10 Units  insulin glargine (Lantus) injection 6 Units  vancomycin in dextrose 5 % (Vancocin) IVPB 750 mg  acyclovir (Zovirax) 675 mg in dextrose 5% 100 mL IV  dexAMETHasone (Decadron) 10 mg in dextrose 5% 50 mL IV  potassium, sodium phosphates (Phos-NaK) 280-160-250 mg packet 1 packet  magnesium sulfate IV 2 g  acetaminophen (Tylenol) tablet 650 mg  magnesium sulfate 2 GM/50ML IV  - Omnicell Override Pull  ampicillin (Omnipen) in sodium chloride 0.9 % 100 mL IV 2 g  dexmedeTOMIDine in NS (Precedex) 400 mcg in 100 mL (4 mcg/mL) infusion  pantoprazole (ProtoNix) injection 40 mg  heparin (porcine) injection 5,000 Units  oxygen (O2) therapy  propofol (Diprivan) infusion  insulin lispro (HumaLOG) injection 5 Units  propofol (Diprivan) 10 mg/mL injection  - Omnicell Override Pull  surgical lubricant gel  - Omnicell Override Pull  insulin glargine (Lantus) injection 10 Units  lactated Ringer's bolus 500 mL  midazolam (Versed) injection 2 mg  doxycycline (Vibramycin) in dextrose 5 % in water (D5W) 100 mL  mg  midazolam (Versed) injection 2 mg  propofol (Diprivan) 10 mg/mL injection  - Omnicell Override Pull  propofol (Diprivan) 10 mg/mL injection  - Omnicell Override Pull  midazolam (Versed) injection 2  mg  levETIRAcetam (Keppra) tablet 750 mg  gadoterate meglumine (Dotarem) injection 13.5 mL  levETIRAcetam (Keppra) 750 mg in sodium chloride 0.9% 100 mL IVPB  acyclovir (Zovirax) 675 mg in dextrose 5% 100 mL IV  LORazepam (Ativan) injection 2 mg  PHENobarbital (Luminal) injection 130 mg  levETIRAcetam (Keppra) 750 mg in sodium chloride 0.9% 100 mL IVPB  midazolam (Versed) injection 0.5 mg  PHENobarbital (Luminal) injection 65 mg  PHENobarbital (Luminal) injection 32.5 mg  OLANZapine (ZyPREXA) injection 2.5 mg  sterile water injection  - Omnicell Override Pull  lactated Ringer's bolus 1,000 mL  cefTRIAXone (Rocephin) 2 g IV in dextrose 5% 50 mL  white petrolatum (Aquaphor) 41 % ointment  - Omnicell Override Pull  insulin glargine (Lantus) injection 15 Units  dextrose 10 % infusion  insulin lispro (HumaLOG) injection 3 Units  valproate (Depacon) 125 mg in dextrose 5% 50 mL IV      Relevant Results  Labs  Results from last 72 hours   Lab Units 10/03/23  0411 10/01/23  1124   WBC AUTO x10*3/uL 13.6* 14.6*   HEMOGLOBIN g/dL 11.9* 14.9   HEMATOCRIT % 35.8* 42.5   PLATELETS AUTO x10*3/uL 188 265   NEUTROS PCT AUTO % 82.3 78.7   LYMPHS PCT AUTO % 11.2 14.0   MONOS PCT AUTO % 5.9 6.2   EOS PCT AUTO % 0.0 0.0     Results from last 72 hours   Lab Units 10/03/23  0411 10/02/23  0254 10/02/23  0206   SODIUM mmol/L 133* 138 138   POTASSIUM mmol/L 3.9 3.5 3.5   CHLORIDE mmol/L 97* 100 101   CO2 mmol/L 24 21 23   BUN mg/dL 29* 24* 23   CREATININE mg/dL 0.93 1.07* 1.09*   GLUCOSE mg/dL 280* 142* 143*   CALCIUM mg/dL 9.2 9.1 9.2   ANION GAP mmol/L 16 21* 18   EGFR mL/min/1.73m*2 71 60* 59*   PHOSPHORUS mg/dL 5.2* 2.4* 2.1*     Results from last 72 hours   Lab Units 10/03/23  0411 10/02/23  0254 10/02/23  0206 10/01/23  1946 10/01/23  1033   ALK PHOS U/L  --   --   --   --  71   BILIRUBIN TOTAL mg/dL  --   --   --   --  1.0   PROTEIN TOTAL g/dL  --   --   --   --  7.2   ALT U/L  --   --   --   --  16   AST U/L  --   --   --   --  24  "  ALBUMIN g/dL 3.2* 3.1* 3.0*   < > 4.0    < > = values in this interval not displayed.     Estimated Creatinine Clearance: 61 mL/min (by C-G formula based on SCr of 0.93 mg/dL).  No results found for: \"CRP\"  Microbiology  No results found for the last 14 days.  Microbiology  No results found for the last 14 days.     10/1 BC pending  10/1 MRSA nares positive  10/2 legionella negative  10/2 strep pneumococcosis negative  10/2 CSF WBC 27, 47 protein, 116, 34 neutrophils, 45 lymphocytes  10/2 CSF VDRL  10/2 molecular microbiology  10/2 CSF gram stain rare polymorphonuclear leukocytes, pending culture  10/2 HSV CSF negative         Imaging     Xray abdomen   IMPRESSION:  1.  Enteric tube is unchanged in position with the tip projecting  over proximal stomach and side hole projecting over distal esophagus.  Consider advancing.      Signed by: Waqas Anderson 10/2/2023 1:03 PM  Dictation workstation:   [id number]     CT angio  IMPRESSION:  1. No acute large territorial infarct or intracranial hemorrhage.  2. No evidence for significant stenosis or large branch vessel  cutoffs of the intracranial vessels. No evidence of traumatic  arterial injury.  3. No traumatic spondylolisthesis of the cervical spine.  4. Enteric tube coils within the pharynx and terminates within the  right piriform sinus and recommend repositioning.  5. Endotracheal tube with its tip terminating approximately 1.6 cm  above the level of the rubens.      CT chest abdomen pelvis  FINDINGS:  Please note that the evaluation of vessels, lymph nodes and organs is  limited without intravenous contrast.   CHEST:  There is an endotracheal tube 2.2 cm above the rubens.  MEDIASTINUM:  The heart is normal in size without pericardial effusion.  LUNGS/PLEURA:  There is no pleural effusion, pleural thickening, or pneumothorax.   The airways are patent.  There are bibasilar pulmonary infiltrates.  LYMPH NODES:  Thoracic lymph nodes are not enlarged.  ABDOMEN:   "   CT head  IMPRESSION:  1. No acute large territorial infarct or intracranial hemorrhage.  2. No evidence for significant stenosis or large branch vessel  cutoffs of the intracranial vessels. No evidence of traumatic  arterial injury.  3. No traumatic spondylolisthesis of the cervical spine.  4. Enteric tube coils within the pharynx and terminates within the  right piriform sinus and recommend repositioning.  5. Endotracheal tube with its tip terminating approximately 1.6 cm  above the level of the rubens.    MRI    IMPRESSION:  1. No acute intracranial abnormality and no abnormal intracranial  enhancement or mass. No mass effect.  2. Chronic intracranial findings as above including findings of  probable chronic small vessel ischemic changes and probable old small  lacunar infarcts.        Assessment/Plan        Assessment and recommendations:     #Altered mental status  #Fevers     Patient with fever and altered mentation concerning for encephalitis, but now the suspicion is low after work-up. CSF looks less consistent with bacterial and might be more consistent with viral meningitis, although suspicion for meningitis is low given very minimal WBC elevation (27/mL) and protein, and glucose being not decreased. Also with some possibility of  pneumonia therefore will continue coverage as below.      -Continue ceftriaxone  -Continue doxycyline  -Stop Acyclovir  - Check pending tests (including WNV CSF antibodies)     ID will continue to follow. If any questions regarding this patient please call Team pager.  Discussed with Dr. Danish Brown  Infectious disease, PGY IV  Team B pager [phone]  General ID pager [phone]

## 2023-10-03 NOTE — SIGNIFICANT EVENT
Patient extubated by MICU night team ~420 am after brief trial while awake and on dexmedetomidine, RSBI at time of pre-liberation trial was 39.

## 2023-10-03 NOTE — CONSULTS
"Referring Provider: Nelly Naik MD    HISTORY OF PRESENT ILLNESS:  Valentina Fontanez is a 58 y.o. female with unknown past psych hx and a past medical history of T2D who was admitted to [facility] on 10/1/23 for suspected seizures and unclear etiology of encephalopathy. Pt was found down by her mother, seized en route to hospital, and was given versed by EMS. Upon arrival, GCS was 4 in ED, pt was sedated/intubated for AMS, with lactic acidosis. Pt was extubated early morning of 10/3. Psychiatry was consulted on 10/3/2023 for agitation and altered mental status. Specifically interested in agitation recs that would not suppress seizures as pt is being monitored on EEG for epilepsy workup.    On chart review,   PRN meds 10/3:  Olanzapine 2.5mg IM - 9:18am  Versed 0.5mg IV - 7:50am  Phenobarbital 130mg IV - 5:31am    Continuous meds 10/3:   Precedex 4mcg/ml IV (currently weaning)    On interview, patient was agitated and unable to cooperate with interview, with wrist restraints. She asked repeatedly \"why are you here\", pulled at restraints and her lake, and tried to get out of bed. She did not appear to know where she was and when nursing team tried to redirect her, she asked \"why are you all lying\".     Attempted to obtain collateral from mother, who did not answer phone (number is [phone]). No past history acessible in record.    PSYCHIATRIC REVIEW OF SYSTEMS  Depression:  unable to assess  Anxiety:  unable to assess  Radha:  unable to assess  Psychosis:  unable to assess  Delirium: confusion, disorientation, fluctuating or reduced consciousness, increased or decreased psychomotor activity, and difficulty directing, focusing, sustaining or shifting attention    Trauma:  unable to assess    PSYCHIATRIC HISTORY  Prior diagnoses: unknown  Prior hospitalizations: unknown  History of suicide attempts: unknown  History of self-harm: unknown  History of trauma/abuse/loss: unknown  History of violence: unknown    Current " psychiatrist: unknown  Current mental health agency: unknown  Current : unknown  Current outpatient treatment: unknown  Guardian or payee: unknown    Current psychiatric medications: none, per [employer] record  Past psychiatric medications: unknown  Past psychiatric treatments: unknown    Family psychiatric history: unable to assess      SUBSTANCE USE HISTORY   She reports current alcohol use. Drug use questions deferred to the physician. No history on file for tobacco use.    Tobacco: unknown  Alcohol: per family, potential problematic use, escalating following recent death of brother     - History of severe withdrawal: unknown     - Last use: unknown  Cannabis: unknown  Other substances: unknown  Prior substance use disorder treatment: unknown    SOCIAL HISTORY  Social History     Socioeconomic History    Marital status: Single     Spouse name: None    Number of children: None    Years of education: None    Highest education level: None   Occupational History    None   Tobacco Use    Smoking status: Unknown    Smokeless tobacco: None   Vaping Use    Vaping Use: Unknown   Substance and Sexual Activity    Alcohol use: Yes     Comment: unknown per family    Drug use: Defer    Sexual activity: Defer   Other Topics Concern    None   Social History Narrative    None     Social Determinants of Health     Financial Resource Strain: Not on file   Food Insecurity: No Food Insecurity (10/2/2023)    Hunger Vital Sign     Worried About Running Out of Food in the Last Year: Never true     Ran Out of Food in the Last Year: Never true   Transportation Needs: No Transportation Needs (10/2/2023)    PRAPARE - Transportation     Lack of Transportation (Medical): No     Lack of Transportation (Non-Medical): No   Physical Activity: Not on file   Stress: Not on file   Social Connections: Not on file   Intimate Partner Violence: Not At Risk (10/2/2023)    Humiliation, Afraid, Rape, and Kick questionnaire     Fear of Current  or Ex-Partner: No     Emotionally Abused: No     Physically Abused: No     Sexually Abused: No   Housing Stability: Low Risk  (10/2/2023)    Housing Stability Vital Sign     Unable to Pay for Housing in the Last Year: No     Number of Places Lived in the Last Year: 1     Unstable Housing in the Last Year: No      Current living situation: lives with mother at home    PAST MEDICAL HISTORY    Past Medical History: type 2 diabetes, HTN  Prior Head trauma/TBI/LOC/seizure history: unknown, MRI brain shows chronic small vessel ischemic changes and old lacunar infarcts    PAST SURGICAL HISTORY  Past Surgical History: none on file    FAMILY HISTORY  No family history on file.     ALLERGIES  Patient has no known allergies.    OARRS REVIEW  OARRS checked: No data recorded   OARRS comments: no data    OBJECTIVE    VITALS      10/3/2023     7:00 AM 10/3/2023     8:00 AM 10/3/2023     8:12 AM 10/3/2023     9:00 AM 10/3/2023    10:00 AM 10/3/2023    11:00 AM 10/3/2023    12:00 PM   Vitals   Systolic 123 148  156 135 134 137   Diastolic 81 98  100 95 91 94   Heart Rate 55 55  53 53 54 52   Temp   36.2 °C (97.2 °F)   35 °C (95 °F)    Resp 13 15  26 16 13 13        MENTAL STATUS EXAM  Appearance: slightly disheveled, but within normal for hospital stay  Attitude: uncooperative and agitated  Behavior: some eye contact  Motor Activity: agitated, pulling at restraints and repeatedly trying to get out of bed  Speech: mumbled, muted  Mood: unable to assess due to delirium  Affect: angry, suspicious  Thought Process: disorganized, tangential  Thought Content:  unable to assess due to delirium  Thought Perception: unable to assess due to delirium  Cognition: unable to fully assess, but patient was clearly not oriented to place. poor attention/concentration.  Insight: Poor  Judgement: Poor      MEDICAL REVIEW OF SYSTEMS    Unable to assess d/t altered mental status    HOME MEDICATIONS  Medication Documentation Review Audit       Reviewed by  "Jeniffer Del Rio RN (Registered Nurse) on 10/03/23 at 1220      Medication Order Taking? Sig Documenting Provider Last Dose Status            No Medications to Display                                    CURRENT MEDICATIONS  Scheduled medications  acetaminophen, 650 mg, oral, q4h  [START ON 10/4/2023] cefTRIAXone, 2 g, intravenous, q24h  doxycycline, 100 mg, intravenous, q12h  folic acid, 1 mg, oral, Daily  heparin (porcine), 5,000 Units, subcutaneous, q8h  insulin glargine, 10 Units, subcutaneous, Nightly  insulin lispro, 0-10 Units, subcutaneous, q4h  levETIRAcetam, 750 mg, intravenous, q12h  multivitamin with minerals, 1 tablet, oral, Daily  oxygen, , inhalation, Continuous - 02/gases  potassium chloride, 40 mEq, intravenous, Once  sodium chloride, 1,000 mL, intravenous, Once  thiamine, 500 mg, intravenous, TID        Continuous medications  dexmedeTOMIDine, 0.1-1.5 mcg/kg/hr, Last Rate: 0.4 mcg/kg/hr (10/03/23 1223)        PRN medications  PRN medications: dextrose, dextrose, glucagon     LABS  No results displayed because visit has over 200 results.          IMAGING      PSYCHIATRIC RISK ASSESSMENT  Violence Risk Factors:      Acute Risk of Harm to Others is Considered: Moderate due to confusion/disorientation  Acute Risk of Harm to Self is Considered: Moderate due to confusion/disorientation    ASSESSMENT AND PLAN    Valentina Fontanez is a 58 y.o. female with unknown past psych hx and a past medical history of T2D who was admitted to [facility] on 10/1/23 for suspected seizures and unclear etiology of encephalopathy. Psychiatry was consulted on 10/3/2023 for agitation and altered mental status. Specifically interested in agitation recs that would not suppress seizures as pt is being monitored on EEG for epilepsy workup.    On interview, patient was agitated and unable to cooperate with interview, with wrist restraints. She asked repeatedly \"why are you here\", pulled at restraints and her lake, and tried to get out " "of bed. She did not appear to know where she was and when nursing team tried to redirect her, she asked \"why are you all lying\". Was not able to obtain collateral from mother.    On initial assessment, patient was agitated and unable to cooperate with interview. She did not appear oriented to person, place, or time. Presentation is concerning for delirium due to medical conditions (possible meningitis, metabolic acidosis/DKA, substance use/alcohol withdrawal) However, alcohol < 10 and utox positive only for benzos (given midazolam in ambulance) is less concerning for substance use.    10/3 1600 Reassessment: Pt was reassessed in the afternoon and was significantly less agitated. Still very encephalopathic, oriented only to self (oriented to location, date, and situation with prompting). Mood labile, tearful, slightly child-like. Mother at bedside. Mother (Aparna Fontanez [phone]) denies any history of seizures or psychiatric treatment, including inpatient hospitalization. Mother endorses some problematic drinking, but not consistent; recently worse s/p death of brother \"all day, every day.\"    Given lengthened Qtc, would not recommend utilizing antipsychotics PRN for agitation, especially with bradycardia (a further risk factor for torsades). Could consider Depakote as PRN for agitation. Benzodiazepines do carry a risk of worsening hospital-acquired delirium. If pt requiring regular agitation PRNs, could consider scheduled guanfacine which has some evidence base for assisting in weaning from sedation with Precedex.    EKG (10/3/23): QTc of 472, HR 51    IMPRESSION  #delirium (multifactorial, possible infectious/metabolic/substance use/Precedex withdrawal)      RECOMMENDATIONS    Safety:  - Patient does not currently meet criteria for inpatient psychiatric admission.   - Patient lacks the capacity to leave AMA at this time and thus cannot leave AMA. Call CODE VIOLET if patient attempts to leave AMA.  - To evaluate " "decision-making capacity, recommend use of the Capacity Evaluation Tool. Search “ IP Capacity Evaluation under SmartText\" unless the patient has a legal guardian, in which case all decisions per the legal guardian.  - Patient does not require a 1:1 sitter from a psychiatric perspective at this time.  - As with all hospitalized patients, would recommend delirium precautions, as below.    Work-up:  -none at this time    Medications:  -Depakote 125 mg PO/IV q6h PRN agitation    Follow-up:  - Discussed recommendations with primary team.  - Psychiatry will continue to follow    DELIRIUM GUIDELINES:  1) Non-Pharmacological Measures: Please ensure blinds are open during day to allow sufficient daylight to enter room; maintain dark/quiet room at night with minimal interruptions; minimize daytime naps and maintain sleep/wake cycle as able; minimize excessive stimulation; if patient wears glasses or hearing aids, patient should have access to them as sensory deprivation can cause/worsen delirium; minimize room/staff changes; encourage interaction with familiar objects and frequent orientation.     2) Pharmacological Measures: Minimize use of benzodiazepines, anticholinergic medications, and opiates, as these can all exacerbate delirium. Ensure adequate treatment of pain while minimizing use of opioids to the extent possible.     3) Minimize use of restraints to situations where necessary to keep patient and staff safe and to prevent patient from removing lines, tubes, medical devices, dressings, etc.  Continue to regularly re-evaluate continued need for any ordered restraints.      Thank you for allowing us to participate in the care of this patient. Please page [phone] with any questions or concerns.    Patient was seen with resident, Dr. Pond, and staffed/discussed with Dr. Drew, who agrees with above plan.    ALEXANDER GU    I agree with MS3 Yony's excellent note and have amended as necessary. I agree with above assessment " and plan.    Ran Pond MD

## 2023-10-03 NOTE — PROGRESS NOTES
Valentina Fontanez is a 58 y.o. female on day 2 of admission presenting with Altered mental status.    Subjective   Hospital Course:  Her mother states that she was sleeping when she heard fast, heavy breathing and found her daughter at the bottom of the stairs (~7 steps). She kept calling her daughter's name, but her daughter was making incoherent noises and unresponsive. States that she was staring straight ahead, with slurred speech, thrashing and wet herself, so she called EMS. Prior to this, patient's LKN was Saturday morning when she was downstairs making breakfast (pt lives upstairs, mom downstairs). Pt was not complaining of anything at the time and appeared in her usual state of health. However, mother states that her brother recently  on Thursday and has been feeling down. Denies of depression, suicide attempts, or prior sz. Asked her daughter if she ingested anything, but unable to respond. The mother states that her other brother had concerns that she may be a closet alcoholic after finding multiple wine bottles in her home while helping her move, but the mother states that she has not seen her daughter drinking. Per ED notes, patient has been binge drinking. En route, patient seized and given 5mg IM Versed. POC glucose 563.      Upon arrival to the ED, pt noted to have some decorticate posturing. Pt in cervical collar, on mask ventilation. GCS 4, intubated in the trauma bay (with etomidate 10mg half dose and lawanda 100 per ED note) AC VC 18/450/5 and placed on prop/fent due to agitation. Trauma primary and secondary survey unremarkable, CT imaging remarkable for bibasilar infiltrates. No acute trauma surgical intervention required per gen surg note. Neurology consulted for encephalopathy who recommended LP, MRI, cvEEG. LP performed overnight on 10/1 which revealed a viral pattern of CSF but PCR was negative for any organisms. She remained on empiric antimicrobial coverage with vancomycin, ceftriaxone,  ampicillin, acyclovir. Overnight on 10/2, her EEG was positive for epileptiform activity and she was started on Keppra 750mg IV BID. Her MRI head was negative. She was extubated the morning of 10/3 but had increased agitation requiring a Precedex drip. ID recommended stopping vancomycin, ampicillin, acyclovir and covering aspiration pneumonia with ceftriaxone and doxycycline. Precedex drip was weaned off over the day, Valerio catheter was removed, and she was placed on 1:1 sitter with Depakote recommended by Psychiatry as an agitation PRN.    Action items:  Continue to follow-up EEG and Neuro recs  Followup ID recs, treating for aspiration pneumonia with CAP coverage currently  Psych recommending Depakote for agitation  Continue weaning oxygen  Continue trending CK levels  Continue titrating insulin dosage    Active Medications  acetaminophen, 650 mg, oral, q4h  [START ON 10/4/2023] cefTRIAXone, 2 g, intravenous, q24h  doxycycline, 100 mg, intravenous, q12h  folic acid, 1 mg, oral, Daily  heparin (porcine), 5,000 Units, subcutaneous, q8h  insulin glargine, 10 Units, subcutaneous, Nightly  insulin lispro, 0-10 Units, subcutaneous, q4h  levETIRAcetam, 750 mg, intravenous, q12h  multivitamin with minerals, 1 tablet, oral, Daily  oxygen, , inhalation, Continuous - 02/gases  potassium chloride, 40 mEq, intravenous, Once  sodium chloride, 1,000 mL, intravenous, Once  thiamine, 500 mg, intravenous, TID         Review of Systems   Unable to perform ROS: Mental status change          Objective     Physical Exam  Constitutional:       Comments: Alternating between sedated and very agitated   HENT:      Head: Normocephalic and atraumatic.   Eyes:      Conjunctiva/sclera: Conjunctivae normal.      Pupils: Pupils are equal, round, and reactive to light.   Cardiovascular:      Rate and Rhythm: Regular rhythm. Bradycardia present.      Pulses: Normal pulses.      Heart sounds: Normal heart sounds.   Pulmonary:      Effort: Pulmonary  "effort is normal. No respiratory distress.      Breath sounds: Rhonchi present.   Abdominal:      General: Abdomen is flat. There is no distension.      Palpations: Abdomen is soft.      Tenderness: There is no abdominal tenderness.   Genitourinary:     Comments: Valerio in place  Musculoskeletal:      Right lower leg: No edema.      Left lower leg: No edema.   Neurological:      Comments: PERRL. When aroused, moving all four extremities and tracking with eyes appropriately. Able to participate in short conversations.       Last Recorded Vitals  Blood pressure 141/83, pulse 56, temperature 35.5 °C (95.9 °F), resp. rate 18, height 1.676 m (5' 5.98\"), weight 68.6 kg (151 lb 3.8 oz), SpO2 93 %.  Intake/Output last 3 Shifts:  I/O last 3 completed shifts:  In: 3638.9 (53 mL/kg) [I.V.:511.9 (7.5 mL/kg); NG/GT:45; IV Piggyback:3082]  Out: 2090 (30.5 mL/kg) [Urine:2090 (0.8 mL/kg/hr)]  Weight: 68.6 kg     Relevant Results  Results from last 7 days   Lab Units 10/03/23  0411   WBC AUTO x10*3/uL 13.6*   HEMOGLOBIN g/dL 11.9*   HEMATOCRIT % 35.8*   PLATELETS AUTO x10*3/uL 188      Results from last 7 days   Lab Units 10/03/23  0411   SODIUM mmol/L 133*   POTASSIUM mmol/L 3.9   CHLORIDE mmol/L 97*   CO2 mmol/L 24   BUN mg/dL 29*   CREATININE mg/dL 0.93   GLUCOSE mg/dL 280*   CALCIUM mg/dL 9.2        MR brain w and wo IV contrast    Result Date: 10/3/2023    1. No acute intracranial abnormality and no abnormal intracranial enhancement or mass. No mass effect. 2. Chronic intracranial findings as above including findings of probable chronic small vessel ischemic changes and probable old small lacunar infarcts.      XR chest 1 view    Result Date: 10/1/2023    1.  Enteric tube with tip overlying the expected location of the gastroesophageal junction, repositioning is recommended. 2. Faint airspace opacity in lung bases, likely atelectatic. 3. Persistent elevation of the right hemidiaphragm. 4.  Additional medical devices as described " above.     CT chest abdomen pelvis w IV contrast    Bibasilar pulmonary infiltrates. Hepatic steatosis. Fibroid uterus. Otherwise unremarkable CT scan of the chest, abdomen, pelvis, thoracic and lumbosacral spines. There are no acute fractures. Signed by Cory Crockett MD    CT head W O contrast trauma protocol    Result Date: 10/1/2023    1. No acute large territorial infarct or intracranial hemorrhage. 2. No evidence for significant stenosis or large branch vessel cutoffs of the intracranial vessels. No evidence of traumatic arterial injury. 3. No traumatic spondylolisthesis of the cervical spine. 4. Enteric tube coils within the pharynx and terminates within the right piriform sinus and recommend repositioning. 5. Endotracheal tube with its tip terminating approximately 1.6 cm above the level of the rubens.                   Assessment/Plan   Valentina Fontanez is a 52 yo F with reported T2D (per mother, no records available) who presented to the ED after being found down   by mother this AM. Pt reportedly seized en route to hospital, s/p IM Versed by EMS. GCS 4 in ED, s/p intubation. While in the ED, pt afebrile, tachycardic, hypertensive. Imaging unremarkable, no acute trauma surgical intervention per gen surgery. Labs notable for hyperglycemia, AG metabolic acidosis with lactate 13. Alcohol <10, Utox positive only for benzos (likely Versed). Unclear etiology of encephalopathy, possibly 2/2 infectious (i/s/o persistent high fevers) vs withdrawal/intoxication vs metabolic. Low c/f DKA, will dc insulin gtt and start basal-bolus. Continue vanc, start doxy for atypical coverage and acyclovir/CTX for empiric meningitis treatment. Plan to wean sedation as able to assess mentation. Investigation of seizure activity with MRI/EEG.     Neuro:  #Acute encephalopathy  #Reported seizures  #C/f meningoencephalitis  #Alcohol use disorder  ::Encephalopathy, seizures possibly secondary to viral encephalitis vs substance withdrawal  ::CT  Head, CTA Head/Neck, MRI Brain negative for acute abnormalities  ::cvEEG showing epileptiform activity over left parietal lobe  ::Persistently febrile on admission  ::LP done, lab work demonstrating WBC count of 27, protein 47, glucose 116, lymphocytes 45%  ::Meningitis PCR panel negative including HSV negative  ::Persistent agitation this morning  -West Nile IgM and IgG ordered  -cvEEG monitoring still going  -Keppra 750mg IV BID per Neuro  -Empiric abx coverage with doxycycline, ceftriaxone  -Acyclovir and vancomycin to be discontinued per ID  -Tylenol PRN for fever  -Neuro and ID consulted, appreciate recs     #Sedation requirements  #Acute agitation/delirium  ::Possibly iatrogenic in setting of alternating propofol/precedex drips and midazolam/phenobarbital pushes  ::Reportedly reoriented by mother this morning  -Precedex weaned off  -Pyschiatry consulted, recommending Depakote PRN     #Suspected alcohol use disorder  -[facility] protocol  -IV Thiamine 500mg TID     CV:  #Hypertension  ::Initial blood pressures as high as 239/127  ::S/p nicardipine drip, now normotensive  -Blood pressures have dropped to normotension  -Hydralazine PRN for hypertension     Pulm:  #Acute hypoxic respiratory failure  #C/f aspiration pneumonia  ::S/p extubation on early 10/3, now on 4LNC  ::Unclear cause, possibly secondary to aspiration pneumonia vs aspiration pneumonitis  ::Right basilar infiltrates seen on CT Chest consistent with aspiration  ::Legionella, Strep Ag tests negative  ::Procal of 0.41  ::MRSA nares+  ::Blood cultures NGTD  -Being covered with doxycycline, ceftriaxone  -Wean oxygen as able     GI:  #Nutrition  -Passed bedside swallow, starting with clear liquid diet, advance as tolerated     Renal:  #YUMI, improving  ::Reported, although no known baseline  ::Likely prerenal in setting of infection, seizures  ::Urine lytes showing FENa of <.01%  ::Current Cr down to 0.93 from 1.07 following fluids  -Continue to trend  RFPs  -Valerio to be removed  -Avoid nephrotoxic medications     Infectious Disease:  #C/f meningoencephalitis  #C/f aspiration pneumonia  ::Abx day #3 (10/1-*)  -Ceftriaxone 2g daily  -Doxycycline 100mg BID     Endo:  #Hyperglycemia  -Low concern for DKA/HHS  -Blood glucose elevated, continuing to titrate insulin  -Lantus 10u daily, moderate SSI     MSK  #C/f rhabdomyolysis  ::CK likely elevated in setting of seizure  ::CK trending up to 1019 from 881  -1L LR bolus ordered today  -Continue trending     Ventilator: None  Electrolytes: replete PRN  Access/Lines/Tubes: PIVx3, Valerio  Abx: Ceftriaxone, doxycycline (10/1-*)  Drips: None  PPX: subcutaneous heparin  Code status: FULL CODE  NOK: Aparna (mother) [phone]       Wan Wright MD

## 2023-10-03 NOTE — CONSULTS
Inpatient consult to Infectious Diseases  Consult performed by: Erasmo Brown MD  Consult ordered by: Nelly Naik MD        Please refer to consult note written in epic from 10/2.     Erasmo Brown MD

## 2023-10-03 NOTE — PAYER REVIEW NOTE
CC: C/f meningitis and seizure     History Of Present Illness  Two Trauma Whiskimani (Valentina Fontanez [id number]) 52 y/o F w/ PMH of 52 y/o F w/ PMH of EOTH use and T2DM who was found down at home and reported to have seizure witnessed by EMS for whom neurology was consulted.     The patient is intubated and sedated, hx obtained from mother and chart review     The patient has unclear ETOH use hx; however, there is concern from family for alcoholism and recent binge drinking behavior. The patient has reportedly had depressed mood recently due to death of relative. Per the patient's mother, she was in her normal state of health with no complaints of recent illness or changes in medication. The patient lives with her mother who had not seen the patient since 9/30. On 10/1, she heard abnormal breathing from the stairs and found the patient lying on the ground and the foot of the steps unable to get up on her own and speaking incoherently. EMS was called and the patient was brought to the hospital. En route, the patient reportedly had seizure like activity for which she was given IM Versed 5mg.     On arrival to the ED, the patient was intubated for airway protection due to AMS (GCS 4), started on prop/fent and admitted to the MICU w/ c/f DKA. Workup notable for Lactate of 16.4, pH 7.17, WBC 14.6, and glucose 496. CTH, CTA h&n, CT C/T/L spine, CT C/A/P unremarkable. On admission to the MICU, the patient was noted to be persistently febrile (Tmax 39.3) and started on Vanc/Zosyn and Ceftriaxone/Acyclovir/Dexamethasone for CNS coverage. Neurology was consulted for new-onset seizure and c/f meningitis.     ROS  Unable to obtain due to patient condition     Past Medical History  - Type 2 DM     Surgical History  Surgical History   _History reviewed. No pertinent surgical history.   _     Family History  No neurological family hx     Social History  Lives with mom, independent in ADLs and IADLs  Tobacco: mother reports denies  ETOH:  mother reports occasional, brother has concern patient is alcoholic  Illicit: mother denies     Home Meds  Metformin     Allergies  Morphine: swelling       Physical Exam:  GENERAL APPEARANCE:  Intubated, sedated, on prop and fent     MENTAL STATE:   Pt intubted and sedated, not verbal. Eyes closed to nox stim.   Recent and remote memory were impaired.  Attention span and concentration were impaired. Language testing was unable to be performed. Does not follow commands.     OPHTHALMOSCOPIC:   Unable to assess due to limited pt cooperation/miotic pupils      CRANIAL NERVES:   CN 2      Difficult to assess due to limited pt cooperation, no clear blink to threat noted.  CN 3, 4, 6   Pupils round, 3 mm in diameter, equally reactive to light. Lids symmetric; no ptosis.   No nystagmus.   CN 5, 7  Corneal reflex present bilaterally.  CN 8   Unable to assess due to pt's impaired mental status   CN 9   Unable to assess as pt is intubated  CN 10  Cough/gag reflexes present.  CN 11   Unable to assess as pt is intubated   CN 12   Unable to assess as pt is intubated     MOTOR:   Muscle bulk and tone were normal in both upper and lower extremities.   No fasciculations, tremor or other abnormal movements were present.   Wd to nox stim in BLE.   No nuchal rigidity on exam. Negative Kernig sign.  On testing for Brudzinski sign, pt noted to have internal rotation of lower extremities (L>R).      REFLEXES:                       R          L  BR:               2         2  Biceps:         2          2  Triceps:        2          2  Knee:           3          3  Ankle:          2          2     Suprapatellar and crossed adductor reflexes present.   Babinski: toes mute to plantar stimulation bilaterally.   No clonus.     SENSORY:   Unable to fully assess due to pt's impaired mental status.  Withdrawal to noxious stimuli in BLE. Localizes to nox stim in BUE.     COORDINATION:    Unable to assess due to pt's impaired mental status       GAIT:   Unable to assess as pt is intubated        Assessment:  Two Trauma Whiskey (Valentina Fontanez [id number]) 54 y/o F w/ PMH of 54 y/o F w/ PMH of EOTH use and T2DM who was found down at home and reported to have seizure witnessed by EMS for whom neurology was consulted. Per mother, pt was found down and reportedly seemed to be confused and incoherent. On arrival to ED, pt was intubated for airway protection and started on prop/fent drips. Workup notable for Lactate of 16.4, pH 7.17, WBC 14.6, and glucose 496. CTH, CTA h&n, CT C/T/L spine, CT C/A/P unremarkable. On admission to the MICU, the patient was noted to be persistently febrile (Tmax 39.3) and started on Vanc/Zosyn and Ceftriaxone/Acyclovir/Dexamethasone for CNS coverage. Given witnessed seizure-like activity, would recommend monitoring with vEEG prior to weaning sedation. No indication for AEDs at this time given it was a one-time event and likely provoked iso possible infection vs DKA. Recurrent fevers iso AMS and seizure is c/f meningitis so would recommend getting a stat LP and starting empiric abx in the interim.      Recommendations:  - Monitoring with cvEEG to assess for epileptiform activity  - Please perform lumbar puncture and send for the following labs:  --cell count and diff in tubes 1 and 4, total protein and glucose  --biofire panel, CSF culture and smear  -Given c/f meningitis, please start the following medications:    - Dexamethasone 10 mg IV Q6H x4 days (30 minutes before or with the first dose of antibiotics)      - Ceftriaxone 2 grams daily Q12H    - Vancomycin 1 gram IV: (15-20 mg/kg/dose Q8H)    - Acyclovir 10-15 mg/kg (ideal body weight) IV Q8H     Mauro Harris MD  PGY-2, Neurology   [facility]  [address] [facility] [address]  [phone]    TAX ID: [facility] - [id number]  [place] [facility] [id number]     Reviewer Contact Information: Tracey Ortiz RN  Admit order type, date and time: Inpatient,  10/1/2023 10:22 AM  ICD-10/ Diagnosis: TRAUAM FALL    Anticipated Discharge Plan/Services needed at Discharge:     Admission to  in the past 30 days? ([employer] only) no    Is the Discharge Summary from the previous admission included in this review?  no      I have seen and evaluated the patient with the neurology team, I agree with the assessment and pan, which were formulated with my direct input.   Picture is consistent with meningoencephalitis.  _Primitivo Agosto MD PhD_

## 2023-10-03 NOTE — NURSING NOTE
Patient thrashing around, trying to kick caregivers and trying to get out of bed. Precedex gtt ongoing, bilateral soft restraints on, sitter request form sent. Providers aware-- phenobarb ordered. RN at bedside at this time

## 2023-10-03 NOTE — ED PROCEDURE NOTE
Procedure  Critical Care    Performed by: Brinda Harris MD  Authorized by: Brinda Harris MD    Critical care provider statement:     Critical care time (minutes):  100    Critical care time was exclusive of:  Separately billable procedures and treating other patients and teaching time    Critical care was necessary to treat or prevent imminent or life-threatening deterioration of the following conditions:  Sepsis, shock and respiratory failure    Critical care was time spent personally by me on the following activities:  Examination of patient, evaluation of patient's response to treatment, ventilator management, review of old charts, pulse oximetry, ordering and review of radiographic studies, ordering and review of laboratory studies, ordering and performing treatments and interventions, development of treatment plan with patient or surrogate and discussions with consultants    I assumed direction of critical care for this patient from another provider in my specialty: no      Care discussed with: admitting provider                 Brinda Harris MD  10/03/23 7831

## 2023-10-03 NOTE — ED PROCEDURE NOTE
Procedure  Intubation    Performed by: Montana Ramos MD  Authorized by: Brinda Harris MD    Consent:     Consent obtained:  Emergent situation  Pre-procedure details:     Indications: airway protection and altered consciousness      Patient status:  Unresponsive    Look externally: no concerns      Mouth opening - incisor distance:  3 or more finger widths    Hyoid-mental distance: 3 or more finger widths      Hyoid-thyroid distance: 2 or more finger widths      Obstruction: none      Neck mobility: reduced      C-collar present: yes      Pharmacologic strategy: RSI      Induction agents:  Etomidate    Paralytics:  Rocuronium  Procedure details:     Preoxygenation:  Bag valve mask    CPR in progress: no      Number of attempts:  1  Successful intubation attempt details:     Intubation method:  Oral    Intubation technique: video assisted      Laryngoscope blade:  Mac 4    Bougie used: no      Grade view: I      Tube size (mm):  7.5    Tube type:  Cuffed    Tube visualized through cords: yes    Placement assessment:     ETT at teeth/gumline (cm):  23    Tube secured with:  ETT purdy    Breath sounds:  Equal    Placement verification: chest rise, CXR verification, direct visualization and waveform ETCO2      CXR findings:  Appropriate position  Post-procedure details:     Procedure completion:  Tolerated               Brinda Harris MD  10/03/23 9459

## 2023-10-04 LAB
ALBUMIN SERPL BCP-MCNC: 3.5 G/DL (ref 3.4–5)
ANION GAP SERPL CALC-SCNC: 18 MMOL/L (ref 10–20)
BASOPHILS # BLD AUTO: 0.01 X10*3/UL (ref 0–0.1)
BASOPHILS NFR BLD AUTO: 0.1 %
BUN SERPL-MCNC: 15 MG/DL (ref 6–23)
CALCIUM SERPL-MCNC: 10.1 MG/DL (ref 8.6–10.6)
CHLORIDE SERPL-SCNC: 104 MMOL/L (ref 98–107)
CO2 SERPL-SCNC: 25 MMOL/L (ref 21–32)
CREAT SERPL-MCNC: 0.53 MG/DL (ref 0.5–1.05)
EOSINOPHIL # BLD AUTO: 0.01 X10*3/UL (ref 0–0.7)
EOSINOPHIL NFR BLD AUTO: 0.1 %
ERYTHROCYTE [DISTWIDTH] IN BLOOD BY AUTOMATED COUNT: 11.4 % (ref 11.5–14.5)
GFR SERPL CREATININE-BSD FRML MDRD: >90 ML/MIN/1.73M*2
GLUCOSE BLD MANUAL STRIP-MCNC: 215 MG/DL (ref 74–99)
GLUCOSE BLD MANUAL STRIP-MCNC: 314 MG/DL (ref 74–99)
GLUCOSE BLD MANUAL STRIP-MCNC: 386 MG/DL (ref 74–99)
GLUCOSE BLD MANUAL STRIP-MCNC: 86 MG/DL (ref 74–99)
GLUCOSE BLD MANUAL STRIP-MCNC: 99 MG/DL (ref 74–99)
GLUCOSE SERPL-MCNC: 84 MG/DL (ref 74–99)
HCT VFR BLD AUTO: 45.8 % (ref 36–46)
HGB BLD-MCNC: 15.1 G/DL (ref 12–16)
IMM GRANULOCYTES # BLD AUTO: 0.17 X10*3/UL (ref 0–0.7)
IMM GRANULOCYTES NFR BLD AUTO: 1 % (ref 0–0.9)
LYMPHOCYTES # BLD AUTO: 3.86 X10*3/UL (ref 1.2–4.8)
LYMPHOCYTES NFR BLD AUTO: 23.6 %
MAGNESIUM SERPL-MCNC: 1.88 MG/DL (ref 1.6–2.4)
MCH RBC QN AUTO: 31.9 PG (ref 26–34)
MCHC RBC AUTO-ENTMCNC: 33 G/DL (ref 32–36)
MCV RBC AUTO: 97 FL (ref 80–100)
MONOCYTES # BLD AUTO: 0.96 X10*3/UL (ref 0.1–1)
MONOCYTES NFR BLD AUTO: 5.9 %
NEUTROPHILS # BLD AUTO: 11.37 X10*3/UL (ref 1.2–7.7)
NEUTROPHILS NFR BLD AUTO: 69.3 %
NRBC BLD-RTO: 0 /100 WBCS (ref 0–0)
PHOSPHATE SERPL-MCNC: 2.2 MG/DL (ref 2.5–4.9)
PLATELET # BLD AUTO: 264 X10*3/UL (ref 150–450)
PMV BLD AUTO: 11.6 FL (ref 7.5–11.5)
POTASSIUM SERPL-SCNC: 2.9 MMOL/L (ref 3.5–5.3)
RBC # BLD AUTO: 4.74 X10*6/UL (ref 4–5.2)
SODIUM SERPL-SCNC: 144 MMOL/L (ref 136–145)
WBC # BLD AUTO: 16.4 X10*3/UL (ref 4.4–11.3)

## 2023-10-04 PROCEDURE — 2500000004 HC RX 250 GENERAL PHARMACY W/ HCPCS (ALT 636 FOR OP/ED): Performed by: STUDENT IN AN ORGANIZED HEALTH CARE EDUCATION/TRAINING PROGRAM

## 2023-10-04 PROCEDURE — 96372 THER/PROPH/DIAG INJ SC/IM: CPT

## 2023-10-04 PROCEDURE — 2500000005 HC RX 250 GENERAL PHARMACY W/O HCPCS: Performed by: STUDENT IN AN ORGANIZED HEALTH CARE EDUCATION/TRAINING PROGRAM

## 2023-10-04 PROCEDURE — 96372 THER/PROPH/DIAG INJ SC/IM: CPT | Performed by: STUDENT IN AN ORGANIZED HEALTH CARE EDUCATION/TRAINING PROGRAM

## 2023-10-04 PROCEDURE — 36415 COLL VENOUS BLD VENIPUNCTURE: CPT | Performed by: STUDENT IN AN ORGANIZED HEALTH CARE EDUCATION/TRAINING PROGRAM

## 2023-10-04 PROCEDURE — 97162 PT EVAL MOD COMPLEX 30 MIN: CPT | Mod: GP

## 2023-10-04 PROCEDURE — 2500000002 HC RX 250 W HCPCS SELF ADMINISTERED DRUGS (ALT 637 FOR MEDICARE OP, ALT 636 FOR OP/ED): Performed by: STUDENT IN AN ORGANIZED HEALTH CARE EDUCATION/TRAINING PROGRAM

## 2023-10-04 PROCEDURE — 80069 RENAL FUNCTION PANEL: CPT

## 2023-10-04 PROCEDURE — 99233 SBSQ HOSP IP/OBS HIGH 50: CPT | Performed by: INTERNAL MEDICINE

## 2023-10-04 PROCEDURE — 2500000001 HC RX 250 WO HCPCS SELF ADMINISTERED DRUGS (ALT 637 FOR MEDICARE OP): Performed by: STUDENT IN AN ORGANIZED HEALTH CARE EDUCATION/TRAINING PROGRAM

## 2023-10-04 PROCEDURE — 83735 ASSAY OF MAGNESIUM: CPT | Performed by: STUDENT IN AN ORGANIZED HEALTH CARE EDUCATION/TRAINING PROGRAM

## 2023-10-04 PROCEDURE — 2500000002 HC RX 250 W HCPCS SELF ADMINISTERED DRUGS (ALT 637 FOR MEDICARE OP, ALT 636 FOR OP/ED)

## 2023-10-04 PROCEDURE — 80069 RENAL FUNCTION PANEL: CPT | Performed by: STUDENT IN AN ORGANIZED HEALTH CARE EDUCATION/TRAINING PROGRAM

## 2023-10-04 PROCEDURE — 82947 ASSAY GLUCOSE BLOOD QUANT: CPT

## 2023-10-04 PROCEDURE — 95715 VEEG EA 12-26HR INTMT MNTR: CPT | Performed by: PSYCHIATRY & NEUROLOGY

## 2023-10-04 PROCEDURE — 2500000004 HC RX 250 GENERAL PHARMACY W/ HCPCS (ALT 636 FOR OP/ED)

## 2023-10-04 PROCEDURE — 85025 COMPLETE CBC W/AUTO DIFF WBC: CPT | Performed by: STUDENT IN AN ORGANIZED HEALTH CARE EDUCATION/TRAINING PROGRAM

## 2023-10-04 PROCEDURE — 2500000001 HC RX 250 WO HCPCS SELF ADMINISTERED DRUGS (ALT 637 FOR MEDICARE OP)

## 2023-10-04 PROCEDURE — 82330 ASSAY OF CALCIUM: CPT

## 2023-10-04 PROCEDURE — 84132 ASSAY OF SERUM POTASSIUM: CPT

## 2023-10-04 PROCEDURE — 36415 COLL VENOUS BLD VENIPUNCTURE: CPT

## 2023-10-04 PROCEDURE — 95720 EEG PHY/QHP EA INCR W/VEEG: CPT | Performed by: PSYCHIATRY & NEUROLOGY

## 2023-10-04 PROCEDURE — 1100000001 HC PRIVATE ROOM DAILY

## 2023-10-04 PROCEDURE — 83735 ASSAY OF MAGNESIUM: CPT

## 2023-10-04 PROCEDURE — 99233 SBSQ HOSP IP/OBS HIGH 50: CPT

## 2023-10-04 RX ORDER — HYDRALAZINE HYDROCHLORIDE 25 MG/1
25 TABLET, FILM COATED ORAL ONCE
Status: COMPLETED | OUTPATIENT
Start: 2023-10-04 | End: 2023-10-04

## 2023-10-04 RX ORDER — LISINOPRIL 40 MG/1
40 TABLET ORAL DAILY
Status: DISCONTINUED | OUTPATIENT
Start: 2023-10-05 | End: 2023-10-07 | Stop reason: HOSPADM

## 2023-10-04 RX ORDER — ACETAMINOPHEN 325 MG/1
650 TABLET ORAL EVERY 4 HOURS PRN
Status: DISCONTINUED | OUTPATIENT
Start: 2023-10-04 | End: 2023-10-07 | Stop reason: HOSPADM

## 2023-10-04 RX ORDER — HYDRALAZINE HYDROCHLORIDE 10 MG/1
10 TABLET, FILM COATED ORAL ONCE
Status: COMPLETED | OUTPATIENT
Start: 2023-10-04 | End: 2023-10-04

## 2023-10-04 RX ORDER — INSULIN GLARGINE 100 [IU]/ML
12 INJECTION, SOLUTION SUBCUTANEOUS NIGHTLY
Status: DISCONTINUED | OUTPATIENT
Start: 2023-10-04 | End: 2023-10-05

## 2023-10-04 RX ORDER — DOXYCYCLINE HYCLATE 100 MG
100 TABLET ORAL EVERY 12 HOURS SCHEDULED
Status: DISCONTINUED | OUTPATIENT
Start: 2023-10-04 | End: 2023-10-05

## 2023-10-04 RX ORDER — LISINOPRIL 20 MG/1
20 TABLET ORAL DAILY
Status: DISCONTINUED | OUTPATIENT
Start: 2023-10-04 | End: 2023-10-04

## 2023-10-04 RX ORDER — LEVETIRACETAM 750 MG/1
750 TABLET ORAL 2 TIMES DAILY
Status: DISCONTINUED | OUTPATIENT
Start: 2023-10-04 | End: 2023-10-07 | Stop reason: HOSPADM

## 2023-10-04 RX ORDER — HYDRALAZINE HYDROCHLORIDE 25 MG/1
25 TABLET, FILM COATED ORAL 3 TIMES DAILY PRN
Status: DISCONTINUED | OUTPATIENT
Start: 2023-10-05 | End: 2023-10-07 | Stop reason: HOSPADM

## 2023-10-04 RX ORDER — POTASSIUM CHLORIDE 14.9 MG/ML
20 INJECTION INTRAVENOUS
Status: DISCONTINUED | OUTPATIENT
Start: 2023-10-04 | End: 2023-10-04

## 2023-10-04 RX ORDER — INSULIN LISPRO 100 [IU]/ML
0-15 INJECTION, SOLUTION INTRAVENOUS; SUBCUTANEOUS
Status: DISCONTINUED | OUTPATIENT
Start: 2023-10-04 | End: 2023-10-07 | Stop reason: HOSPADM

## 2023-10-04 RX ORDER — INSULIN LISPRO 100 [IU]/ML
5 INJECTION, SOLUTION INTRAVENOUS; SUBCUTANEOUS
Status: DISCONTINUED | OUTPATIENT
Start: 2023-10-05 | End: 2023-10-06

## 2023-10-04 RX ORDER — POTASSIUM CHLORIDE 1.5 G/1.58G
POWDER, FOR SOLUTION ORAL
Status: DISPENSED
Start: 2023-10-04 | End: 2023-10-04

## 2023-10-04 RX ORDER — OLANZAPINE 2.5 MG/1
2.5 TABLET ORAL 3 TIMES DAILY PRN
Status: DISCONTINUED | OUTPATIENT
Start: 2023-10-04 | End: 2023-10-07 | Stop reason: HOSPADM

## 2023-10-04 RX ORDER — POTASSIUM CHLORIDE 1.5 G/1.58G
80 POWDER, FOR SOLUTION ORAL ONCE
Status: COMPLETED | OUTPATIENT
Start: 2023-10-04 | End: 2023-10-04

## 2023-10-04 RX ADMIN — HYDRALAZINE HYDROCHLORIDE 10 MG: 10 TABLET, FILM COATED ORAL at 17:06

## 2023-10-04 RX ADMIN — HEPARIN SODIUM 5000 UNITS: 5000 INJECTION INTRAVENOUS; SUBCUTANEOUS at 01:20

## 2023-10-04 RX ADMIN — Medication 1 TABLET: at 08:47

## 2023-10-04 RX ADMIN — INSULIN LISPRO 10 UNITS: 100 INJECTION, SOLUTION INTRAVENOUS; SUBCUTANEOUS at 14:39

## 2023-10-04 RX ADMIN — LEVETIRACETAM 750 MG: 750 TABLET, FILM COATED ORAL at 21:46

## 2023-10-04 RX ADMIN — Medication: at 08:00

## 2023-10-04 RX ADMIN — HEPARIN SODIUM 5000 UNITS: 5000 INJECTION INTRAVENOUS; SUBCUTANEOUS at 09:03

## 2023-10-04 RX ADMIN — POTASSIUM CHLORIDE 80 MEQ: 1.5 POWDER, FOR SOLUTION ORAL at 08:56

## 2023-10-04 RX ADMIN — LEVETIRACETAM 750 MG: 750 TABLET, FILM COATED ORAL at 08:44

## 2023-10-04 RX ADMIN — HYDRALAZINE HYDROCHLORIDE 25 MG: 25 TABLET, FILM COATED ORAL at 19:06

## 2023-10-04 RX ADMIN — THIAMINE HYDROCHLORIDE 500 MG: 100 INJECTION, SOLUTION INTRAMUSCULAR; INTRAVENOUS at 09:00

## 2023-10-04 RX ADMIN — CEFTRIAXONE SODIUM 2 G: 2 INJECTION, SOLUTION INTRAVENOUS at 01:20

## 2023-10-04 RX ADMIN — DOXYCYCLINE 100 MG: 100 INJECTION, POWDER, LYOPHILIZED, FOR SOLUTION INTRAVENOUS at 06:30

## 2023-10-04 RX ADMIN — DOXYCYCLINE HYCLATE 100 MG: 100 TABLET, COATED ORAL at 12:21

## 2023-10-04 RX ADMIN — THIAMINE HYDROCHLORIDE 500 MG: 100 INJECTION, SOLUTION INTRAMUSCULAR; INTRAVENOUS at 15:49

## 2023-10-04 RX ADMIN — INSULIN GLARGINE 12 UNITS: 100 INJECTION, SOLUTION SUBCUTANEOUS at 21:45

## 2023-10-04 RX ADMIN — DOXYCYCLINE HYCLATE 100 MG: 100 TABLET, COATED ORAL at 21:46

## 2023-10-04 RX ADMIN — INSULIN LISPRO 3 UNITS: 100 INJECTION, SOLUTION INTRAVENOUS; SUBCUTANEOUS at 14:37

## 2023-10-04 RX ADMIN — LISINOPRIL 20 MG: 20 TABLET ORAL at 12:21

## 2023-10-04 RX ADMIN — FOLIC ACID 1 MG: 1 TABLET ORAL at 08:46

## 2023-10-04 RX ADMIN — INSULIN LISPRO 3 UNITS: 100 INJECTION, SOLUTION INTRAVENOUS; SUBCUTANEOUS at 09:00

## 2023-10-04 ASSESSMENT — ACTIVITIES OF DAILY LIVING (ADL): ADL_ASSISTANCE: INDEPENDENT

## 2023-10-04 ASSESSMENT — COLUMBIA-SUICIDE SEVERITY RATING SCALE - C-SSRS
6. HAVE YOU EVER DONE ANYTHING, STARTED TO DO ANYTHING, OR PREPARED TO DO ANYTHING TO END YOUR LIFE?: NO
1. SINCE LAST CONTACT, HAVE YOU WISHED YOU WERE DEAD OR WISHED YOU COULD GO TO SLEEP AND NOT WAKE UP?: NO
2. HAVE YOU ACTUALLY HAD ANY THOUGHTS OF KILLING YOURSELF?: NO

## 2023-10-04 ASSESSMENT — COGNITIVE AND FUNCTIONAL STATUS - GENERAL
CLIMB 3 TO 5 STEPS WITH RAILING: TOTAL
STANDING UP FROM CHAIR USING ARMS: A LITTLE
WALKING IN HOSPITAL ROOM: A LITTLE
MOVING TO AND FROM BED TO CHAIR: A LITTLE
TOILETING: A LITTLE
DRESSING REGULAR LOWER BODY CLOTHING: A LITTLE
EATING MEALS: A LITTLE
DRESSING REGULAR UPPER BODY CLOTHING: A LITTLE
DAILY ACTIVITIY SCORE: 18
HELP NEEDED FOR BATHING: A LITTLE
TURNING FROM BACK TO SIDE WHILE IN FLAT BAD: A LITTLE
PERSONAL GROOMING: A LITTLE
MOBILITY SCORE: 16
MOVING FROM LYING ON BACK TO SITTING ON SIDE OF FLAT BED WITH BEDRAILS: A LITTLE

## 2023-10-04 ASSESSMENT — PAIN SCALES - GENERAL
PAINLEVEL_OUTOF10: 0 - NO PAIN

## 2023-10-04 ASSESSMENT — PAIN - FUNCTIONAL ASSESSMENT
PAIN_FUNCTIONAL_ASSESSMENT: 0-10
PAIN_FUNCTIONAL_ASSESSMENT: 0-10

## 2023-10-04 NOTE — PROGRESS NOTES
"Valentina Fontanez is a 58 y.o. female on day 3 of admission presenting with Altered mental status.    Subjective   Patient examined this morning in bed alert in [facility]. Patient was partially oriented and knew her name and the city she was in but thought it was 2003. She had no complaints and denied f/c, SOB, chest pain, abdominal pain.    Objective     Last Recorded Vitals  Blood pressure (!) 187/105, pulse 97, temperature 37.4 °C (99.3 °F), resp. rate 18, height 1.676 m (5' 5.98\"), weight 68.6 kg (151 lb 3.8 oz), SpO2 97 %.  Intake/Output last 3 Shifts:  I/O last 3 completed shifts:  In: 2964 (43.2 mL/kg) [P.O.:300; I.V.:235.5 (3.4 mL/kg); NG/GT:15; IV Piggyback:2413.5]  Out: 5520 (80.5 mL/kg) [Urine:5520 (2.2 mL/kg/hr)]  Weight: 68.6 kg     Physical Exam  Constitutional:       General: She is not in acute distress.     Appearance: Normal appearance.   HENT:      Head: Normocephalic and atraumatic.      Mouth/Throat:      Mouth: Mucous membranes are moist.      Pharynx: Oropharynx is clear.   Eyes:      General: No scleral icterus.     Extraocular Movements: Extraocular movements intact.      Conjunctiva/sclera: Conjunctivae normal.      Pupils: Pupils are equal, round, and reactive to light.   Cardiovascular:      Rate and Rhythm: Normal rate and regular rhythm.      Pulses: Normal pulses.      Heart sounds: Normal heart sounds. No murmur heard.     No friction rub. No gallop.   Pulmonary:      Effort: Pulmonary effort is normal.      Breath sounds: No wheezing, rhonchi or rales.      Comments: Decreased breath sounds at right base. On 2LNC.  Abdominal:      General: Abdomen is flat. Bowel sounds are normal. There is no distension.      Palpations: Abdomen is soft.      Tenderness: There is no abdominal tenderness. There is no right CVA tenderness, left CVA tenderness, guarding or rebound.   Musculoskeletal:      Cervical back: Normal range of motion and neck supple.      Right lower leg: No edema.      Left lower leg: No " edema.   Skin:     General: Skin is warm and dry.      Findings: No rash.   Neurological:      General: No focal deficit present.      Mental Status: She is alert. She is disoriented.      Cranial Nerves: No cranial nerve deficit.      Sensory: No sensory deficit.      Motor: No weakness.         Relevant Results    Labs    Results from last 7 days   Lab Units 10/04/23  0635 10/03/23  0411 10/01/23  1124   WBC AUTO x10*3/uL 16.4* 13.6* 14.6*   HEMOGLOBIN g/dL 15.1 11.9* 14.9   HEMATOCRIT % 45.8 35.8* 42.5   PLATELETS AUTO x10*3/uL 264 188 265            Results from last 7 days   Lab Units 10/04/23  0635 10/03/23  0411 10/02/23  0254   SODIUM mmol/L 144 133* 138   POTASSIUM mmol/L 2.9* 3.9 3.5   CHLORIDE mmol/L 104 97* 100   CO2 mmol/L 25 24 21   BUN mg/dL 15 29* 24*   CREATININE mg/dL 0.53 0.93 1.07*   CALCIUM mg/dL 10.1 9.2 9.1     Results from last 7 days   Lab Units 10/01/23  1033   ALK PHOS U/L 71   BILIRUBIN TOTAL mg/dL 1.0   PROTEIN TOTAL g/dL 7.2   ALT U/L 16   AST U/L 24      Results from last 7 days   Lab Units 10/01/23  1033   INR  1.1        Medications  Scheduled medications  cefTRIAXone, 2 g, intravenous, q24h  doxycycline, 100 mg, oral, q12h JEREMIAH  folic acid, 1 mg, oral, Daily  heparin (porcine), 5,000 Units, subcutaneous, q8h  insulin glargine, 15 Units, subcutaneous, Nightly  insulin lispro, 0-10 Units, subcutaneous, q4h  insulin lispro, 3 Units, subcutaneous, TID with meals  levETIRAcetam, 750 mg, oral, BID  lisinopril, 20 mg, oral, Daily  multivitamin with minerals, 1 tablet, oral, Daily  oxygen, , inhalation, Continuous - 02/gases  potassium chloride, , ,   thiamine, 500 mg, intravenous, TID      Continuous medications     PRN medications  PRN medications: acetaminophen, dextrose 10 % in water (D10W), dextrose, glucagon, potassium chloride, valproate sodium     Imaging  MR brain w and wo IV contrast   Final Result   1. No acute intracranial abnormality and no abnormal intracranial   enhancement or  mass. No mass effect.   2. Chronic intracranial findings as above including findings of   probable chronic small vessel ischemic changes and probable old small   lacunar infarcts.        I personally reviewed the images/study and I agree with the findings   as stated. This study was interpreted at [place], [place].        MACRO:   None.        Signed by: Price Posey 10/3/2023 1:23 AM   Dictation workstation:   [id number]      XR abdomen 1 view   Final Result   1.  Enteric tube projecting over the gastric fundus.        Signed by: Waqas Anderson 10/2/2023 2:40 PM   Dictation workstation:   [id number]      XR abdomen 1 view   Final Result   1.  Enteric tube is unchanged in position with the tip projecting   over proximal stomach and side hole projecting over distal esophagus.   Consider advancing.        Signed by: Waqas Anderson 10/2/2023 1:03 PM   Dictation workstation:   [id number]      XR abdomen 1 view   Final Result   1.  Enteric tube is in place with the tip projecting over distal   esophagus. Consider adjustment/advancement.        Signed by: Waqas Anderson 10/2/2023 12:11 PM   Dictation workstation:   [id number]      XR abdomen 1 view   Final Result   1.  Enteric tube as described above. Consider advancement.   2. Nonobstructive bowel gas pattern.        Signed by: Waqas Anderson 10/2/2023 7:28 AM   Dictation workstation:   [id number]      XR chest 1 view   Final Result   1.  Enteric tube with tip overlying the expected location of the   gastroesophageal junction, repositioning is recommended.   2. Faint airspace opacity in lung bases, likely atelectatic.   3. Persistent elevation of the right hemidiaphragm.   4.  Additional medical devices as described above.        I personally reviewed the images/study and I agree with the findings   as stated. This study was interpreted at [facility],  [place].        MACRO:   None        Signed by: Waqas Jonesani 10/1/2023 8:38 PM   Dictation workstation:   [id number]      XR abdomen 1 view   Final Result   1. Enteric tube tip located in the region of the distal esophagus.   This should be advanced.   2. Nonspecific bowel gas pattern. No definite evidence of mechanical   obstruction..   Signed by Johnny Medina MD      CT head W O contrast trauma protocol   Final Result   1. No acute large territorial infarct or intracranial hemorrhage.   2. No evidence for significant stenosis or large branch vessel   cutoffs of the intracranial vessels. No evidence of traumatic   arterial injury.   3. No traumatic spondylolisthesis of the cervical spine.   4. Enteric tube coils within the pharynx and terminates within the   right piriform sinus and recommend repositioning.   5. Endotracheal tube with its tip terminating approximately 1.6 cm   above the level of the rubens.             I personally reviewed the images/study, and I agree with the findings   as stated above. This study was interpreted at [place], [place].        MACRO:   None             Dictation workstation:   [id number]      CT cervical spine wo IV contrast   Final Result   1. No acute large territorial infarct or intracranial hemorrhage.   2. No evidence for significant stenosis or large branch vessel   cutoffs of the intracranial vessels. No evidence of traumatic   arterial injury.   3. No traumatic spondylolisthesis of the cervical spine.   4. Enteric tube coils within the pharynx and terminates within the   right piriform sinus and recommend repositioning.   5. Endotracheal tube with its tip terminating approximately 1.6 cm   above the level of the rubens.             I personally reviewed the images/study, and I agree with the findings   as stated above. This study was interpreted at [place], [place].         MACRO:   None             Dictation workstation:   [id number]      CT thoracic spine wo IV contrast   Final Result   Bibasilar pulmonary infiltrates.   Hepatic steatosis.   Fibroid uterus.   Otherwise unremarkable CT scan of the chest, abdomen, pelvis, thoracic   and lumbosacral spines. There are no acute fractures.   Signed by Cory Crockett MD      CT lumbar spine wo IV contrast   Final Result   Bibasilar pulmonary infiltrates.   Hepatic steatosis.   Fibroid uterus.   Otherwise unremarkable CT scan of the chest, abdomen, pelvis, thoracic   and lumbosacral spines. There are no acute fractures.   Signed by Cory Crockett MD      CT chest abdomen pelvis w IV contrast   Final Result   Bibasilar pulmonary infiltrates.   Hepatic steatosis.   Fibroid uterus.   Otherwise unremarkable CT scan of the chest, abdomen, pelvis, thoracic   and lumbosacral spines. There are no acute fractures.   Signed by Cory Crockett MD      CT angio head neck w and wo IV contrast   Final Result   1. No acute large territorial infarct or intracranial hemorrhage.   2. No evidence for significant stenosis or large branch vessel   cutoffs of the intracranial vessels. No evidence of traumatic   arterial injury.   3. No traumatic spondylolisthesis of the cervical spine.   4. Enteric tube coils within the pharynx and terminates within the   right piriform sinus and recommend repositioning.   5. Endotracheal tube with its tip terminating approximately 1.6 cm   above the level of the rubens.             I personally reviewed the images/study, and I agree with the findings   as stated above. This study was interpreted at [facility], [place], [place].        MACRO:   None             Dictation workstation:   [id number]      XR pelvis 1-2 views   Final Result   No acute fracture or dislocation..   Signed by Johnny Medina MD      XR chest 1 view   Final Result   No focal pulmonary consolidation pleural effusions..   Signed  by Johnny Medina MD               Assessment/Plan   Principal Problem:    Altered mental status  Active Problems:    Fever    Abnormal CSF cytology    Aspiration pneumonia of both lower lobes (CMS/HCC)    Valentina Fontanez is a 54 yo F with reported T2D (per mother, no records available) who presented to the ED after being found down by mother the morning of admission. Pt reportedly seized en route to hospital, s/p IM Versed by EMS. GCS 4 in ED, s/p intubation, admitted to MICU. Initially treated with insulin gtt empirically for hyperglycemia, but presentation not felt to be DKA.  LP done c/w viral encephalitis, but PCR panel negative (including HSV). Previously on vanc/ceftriaxone/doxy/acyclovir, per ID can narrow treatment to ceftriaxone/doxy for CAP/aspiration pneumonia. 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     #Acute encephalopathy  #Seizure  #Viral meningoencephalitis  ::Encephalopathy, seizures possibly secondary to viral encephalitis vs substance withdrawal  ::CT Head, CTA Head/Neck, MRI Brain negative for acute abnormalities  ::cvEEG showing epileptiform activity over left parietal lobe  ::Persistently febrile on admission, now afebrile since 10/2  ::LP done, lab work demonstrating WBC count of 27, protein 47, glucose 116, lymphocytes 45%  ::Meningitis PCR panel negative including HSV negative  -West Nile IgM and IgG ordered  -cvEEG without any seizures, will dc today  -c/w Keppra 750mg BID PO  -Stopped acyclovir/vancomycin  -Tylenol PRN for fever  -Neuro and ID following     #Acute agitation/delirium  ::Possibly iatrogenic in setting of alternating propofol/precedex drips and midazolam/phenobarbital pushes in MICU  ::Reorientable by family  -Encourage frequent family reorientation  -Psychiatry following, appreciate recs   -dc PRN depikote, start PRN olanzapine 2.5mg   -Psych recommending transfer to MPU     #C/f alcohol use disorder  -D/c CIWA  -Favor acute  viral encephalitis as cause of AMS rather than withdrawal  -IV Thiamine 500mg TID, last dose 10/4 PM  -folic acid 1mg qd     #Hypertension  ::Initial blood pressures as high as 239/127  ::S/p nicardipine drip  -Hypertensive this AM  -Re-starting home lisinopril at 20mg     #Acute hypoxic respiratory failure  #Aspiration pneumonia vs pneumonitis  ::S/p extubation on early 10/3 now on 2L NC  ::Right basilar infiltrates seen on CT Chest consistent with aspiration  ::Legionella, Strep Ag tests negative  ::Procal of 0.41  ::MRSA nares+  ::Blood cultures NGTD  -On CTX/doxy (10/1 - 1/6), 5 day course likely appropriate given negative procal and weaning o2  -Wean oxygen as able     #Nutrition  -Passed bedside swallow, CLD started by MICU, advance as tolerated     #YUMI, resolved  ::Reported, although no known baseline  ::Likely prerenal in setting of infection, seizures. Resolved with IVF  ::Urine lytes showing FENa of <.01%  -Daily RFP, avoid nephrotoxins, strict I&O     #Hyperglycemia  -HbA1c 13.4%, hx of DM2 on metformin at home  -Low concern for DKA/HHS, sp insulin ggt  -Decreased lantus to 12u at bedtime, on insulin 3u AC and SSI, continue to titrate     #C/f rhabdomyolysis, resolved  ::CK likely elevated in setting of seizure  ::CK uptrended to 1019, now 632 s/p 1L  -No ongoing seizures, stop trending CK       F: PRN  E: Replete K>4.0, Phos>3.0, Mg>2.0  N: CLD, advance as tolerated  A: pIV    DVT ppx: subcutaneous heparin  GI ppx: Not indicated    CODE STATUS: Full Code   Surrogate Decision Maker: Christo Braun ([phone])    Case seen and discussed with attending Dr. Márquez, to addend as necessary.    Ran Reis MD PGY-1

## 2023-10-04 NOTE — CARE PLAN
Problem: Balance  Goal: STG - Maintains dynamic sitting & standing balance with upper extremity support and SBA  Outcome: Progressing     Problem: Mobility  Goal: STG - Patient will ambulate >100' with CGA using LRD  Outcome: Progressing     Problem: Transfers  Goal: STG - Patient will perform bed mobility INDEP  Outcome: Progressing  Goal: STG - Patient will transfer sit to and from stand with SBA using LRD as needed  Outcome: Progressing

## 2023-10-04 NOTE — NURSING NOTE
10/04/2023: Transfer Note: 1829  Pt was transferred to [place] [address] Med/Psych. Patient was Transported down by  Transport. She had no belongings. Patient received hydralazine 10mg due to high BP before she was transferred. BP did go down from 189/104  to 166/93 HR 96. Nurse to Nurse report was given to Ivis Sutherland LPN

## 2023-10-04 NOTE — PROGRESS NOTES
Valentina Fontanez is a 58 y.o. female on day 2 of admission presenting with Altered mental status.    Subjective     Transferred to [facility] 5021 in stable condition. Smiling and pleasant on my exam. Complaints of intermittent abd pain, no other complaints. No current abdominal pain.    Hospital Course:  Her mother states that she was sleeping when she heard fast, heavy breathing and found her daughter at the bottom of the stairs (~7 steps). She kept calling her daughter's name, but her daughter was making incoherent noises and unresponsive. States that she was staring straight ahead, with slurred speech, thrashing and wet herself, so she called EMS. Prior to this, patient's LKN was Saturday morning when she was downstairs making breakfast (pt lives upstairs, mom downstairs). Pt was not complaining of anything at the time and appeared in her usual state of health. However, mother states that her brother recently  on Thursday and has been feeling down. Denies of depression, suicide attempts, or prior sz. Asked her daughter if she ingested anything, but unable to respond. The mother states that her other brother had concerns that she may be a closet alcoholic after finding multiple wine bottles in her home while helping her move, but the mother states that she has not seen her daughter drinking. Per ED notes, patient has been binge drinking. En route, patient seized and given 5mg IM Versed. POC glucose 563.     Upon arrival to the ED, pt noted to have some decorticate posturing. Pt in cervical collar, on mask ventilation. GCS 4, intubated in the trauma bay (with etomidate 10mg half dose and lawanda 100 per ED note) AC VC 18/450/5 and placed on prop/fent due to agitation. Trauma primary and secondary survey unremarkable, CT imaging remarkable for bibasilar infiltrates. No acute trauma surgical intervention required per gen surg note. Neurology consulted for encephalopathy who recommended LP, MRI, cvEEG. LP performed overnight  "on 10/1 which revealed a viral pattern of CSF but PCR was negative for any organisms. She remained on empiric antimicrobial coverage with vancomycin, ceftriaxone, ampicillin, acyclovir. Overnight on 10/2, her EEG was positive for epileptiform activity and she was started on Keppra 750mg IV BID. Her MRI head was negative. She was extubated the morning of 10/3 but had increased agitation requiring a Precedex drip. ID recommended stopping vancomycin, ampicillin, acyclovir and covering aspiration pneumonia with ceftriaxone and doxycycline. Precedex drip was weaned off over the day, Valerio catheter was removed, and she was placed on 1:1 sitter with Depakote recommended by Psychiatry as an agitation PRN.    Action items:  Continue to follow-up EEG and Neuro recs  Followup ID recs, treating for aspiration pneumonia with CAP coverage currently  Psych recommending Depakote for agitation  Continue weaning oxygen  Continue trending CK levels  Continue titrating insulin dosage       Objective     Physical Exam  Constitutional:       General: She is not in acute distress.     Appearance: Normal appearance.   Cardiovascular:      Rate and Rhythm: Normal rate and regular rhythm.      Heart sounds: No murmur heard.  Pulmonary:      Effort: Pulmonary effort is normal. No respiratory distress.      Breath sounds: No rhonchi.      Comments: No crackles to anterior auscultation  Abdominal:      Palpations: Abdomen is soft.      Tenderness: There is no abdominal tenderness. There is no guarding.   Neurological:      Mental Status: She is alert.     Last Recorded Vitals  Blood pressure (!) 151/92, pulse 65, temperature 36.2 °C (97.2 °F), resp. rate 16, height 1.676 m (5' 5.98\"), weight 68.6 kg (151 lb 3.8 oz), SpO2 98 %.  Intake/Output last 3 Shifts:  I/O last 3 completed shifts:  In: 4375.6 (63.8 mL/kg) [P.O.:300; I.V.:487.6 (7.1 mL/kg); NG/GT:45; IV Piggyback:3543]  Out: 2560 (37.3 mL/kg) [Urine:2560 (1 mL/kg/hr)]  Weight: 68.6 kg    "     Assessment/Plan   Principal Problem:    Altered mental status  Active Problems:    Fever    Abnormal CSF cytology    Aspiration pneumonia of both lower lobes (CMS/HCC)    Valentina Fontanez is a 54 yo F with reported T2D (per mother, no records available) who presented to the ED after being found down by mother the morning of admission. Pt reportedly seized en route to hospital, s/p IM Versed by EMS. GCS 4 in ED, s/p intubation. While in the ED, pt afebrile, tachycardic, hypertensive. Imaging unremarkable, no acute trauma surgical intervention per gen surgery. Labs notable for hyperglycemia, AG metabolic acidosis with lactate 13. Alcohol <10, Utox positive only for benzos (likely Versed). Presentation most c/w viral encephalitis, which is likely the cause of her AMS. Initially treated with insulin gtt empirically for hyperglycemia, but presentation not felt to be DKA. Previously on vanc/ceftriaxone/doxy/acyclovir, per ID can narrow treatment to ceftriaxone/doxy for CAP/aspiration pneumonia. No need to continue acyclovir as HSV PCR negative. Continue on vEEG.     #Acute encephalopathy  #Seizure  #Viral meningoencephalitis  ::Encephalopathy, seizures possibly secondary to viral encephalitis vs substance withdrawal  ::CT Head, CTA Head/Neck, MRI Brain negative for acute abnormalities  ::cvEEG showing epileptiform activity over left parietal lobe  ::Persistently febrile on admission, now afebrile since 10/2  ::LP done, lab work demonstrating WBC count of 27, protein 47, glucose 116, lymphocytes 45%  ::Meningitis PCR panel negative including HSV negative  -West Nile IgM and IgG ordered  -cvEEG monitoring recommended through 10/4, EEG tech at bedside restarting vEEG on [place]. Can d/c tomorrow if negative for seizure  -Switch Keppra 750mg IV BID to 750mg BID PO  -Stop acyclovir/vancomycin  -Tylenol PRN for fever  -Neuro and ID consulted, appreciate recs     #Acute agitation/delirium  ::Possibly iatrogenic in setting of  alternating propofol/precedex drips and midazolam/phenobarbital pushes in MICU  ::Reorientable by family  -Encourage frequent family reorientation  -Psychiatry consulted, recommending Depakote PRN, ordered  -Pleasant, cooperative on exam on transfer to [place]     #C/f alcohol use disorder  -D/c CIWA  -Favor acute viral encephalitis as cause of AMS rather than withdrawal  -IV Thiamine 500mg TID, last dose 10/4 PM     #Hypertension  ::Initial blood pressures as high as 239/127  ::S/p nicardipine drip, now normotensive  -Hypertensive on transfer, consider starting standing PO medication in AM    #Acute hypoxic respiratory failure  #Aspiration pneumonia vs pneumonitis  ::S/p extubation on early 10/3 now on 2L NC at night  ::Right basilar infiltrates seen on CT Chest consistent with aspiration  ::Legionella, Strep Ag tests negative  ::Procal of 0.41  ::MRSA nares+  ::Blood cultures NGTD  -On CTX/doxy (10/1 - *), 5 day course likely appropriate given negative procal and weaning o2  -Wean oxygen as able     #Nutrition  -Passed bedside swallow, CLD started by MICU, advance as tolerated    #YUMI, resolved  ::Reported, although no known baseline  ::Likely prerenal in setting of infection, seizures. Resolved with IVF  ::Urine lytes showing FENa of <.01%  -Daily RFP, avoid nephrotoxins, strict I&O    #Hyperglycemia  -Low concern for DKA/HHS  -Blood glucose elevated, continuing to titrate insulin  -Lantus 10u daily, moderate SSI     #C/f rhabdomyolysis, resolved  ::CK likely elevated in setting of seizure  ::CK uptrended to 1019, now 632 s/p 1L  -No ongoing seizures, stop trending CK    Electrolytes: replete PRN  Access/Lines/Tubes: PIVx3  Abx: Ceftriaxone, doxycycline (10/1-*)  Drips: None  PPX: Heparin subQ  Code status: FULL CODE  NOK: Aparna (mother) [phone]         Addi Crockett MD

## 2023-10-04 NOTE — PROGRESS NOTES
"ATTILA Fontanez is a 58 y.o. female on day 3 of admission presenting with Altered mental status. No past psych hx and a PMH of T2D. She was found at the bottom of the stairs by her mother, who called EMS on 10/1/23. Upon arrival to the ED, pt noted to have some decorticate posturing. Pt in cervical collar, on mask ventilation. GCS 4, intubated in the trauma bay and placed on prop/fent due to agitation. Trauma primary and secondary survey unremarkable. Neurology consulted for encephalopathy who recommended LP, MRI, cvEEG. LP performed overnight on 10/1 which revealed a viral pattern of CSF but PCR was negative for any organisms. She remained on empiric antimicrobial coverage with vancomycin, ceftriaxone, ampicillin, acyclovir. Overnight on 10/2, her EEG was positive for epileptiform activity and she was started on Keppra 750mg IV BID. Her MRI head was negative. She was extubated the morning of 10/3 but had increased agitation requiring a Precedex drip. ID recommended stopping vancomycin, ampicillin, acyclovir and covering aspiration pneumonia with ceftriaxone and doxycycline. Precedex drip was weaned off over the day, Valerio catheter was removed, and she was placed on 1:1 sitter with Depakote recommended by Psychiatry as an agitation PRN.     Today, patient was more alert and cooperative. Her mood appeared elevated and she laughed often during the interview, occasionally inappropriate to the situation. 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 Denied any current depressive symptoms. Endorsed previous episodes of heavy drinking though denies having had EtOH in several months.    Sitter reported that patient had pulled one of her EEG leads out immediately prior to assessment, unable to explain why. Nurse noted that patient required frequent redirection though had not " "been agitated. No PRN agitation medications needed.    ==========  Additional information:    OBJECTIVE    VITALS      10/3/2023     8:00 PM 10/3/2023     9:00 PM 10/3/2023    10:00 PM 10/3/2023    11:00 PM 10/4/2023    12:00 AM 10/4/2023     3:34 AM 10/4/2023     8:46 AM   Vitals   Systolic 142 151 153 148 147 172 187   Diastolic 90 92 87 113 77 93 105   Heart Rate 63 65 63 63 65 89 97   Temp     36.1 °C (97 °F) 36.5 °C (97.7 °F) 37.4 °C (99.3 °F)   Resp 19 16 12 15 21 20 18        MENTAL STATUS EXAM  Appearance: Appears stated age, in hospital gown.   Attitude: cooperative but difficult to maintain concentration  Behavior: some eye contact  Motor Activity: Patient occasionally fidgets with IV lines and needs to be redirected by sitter  Speech: mumbled, slow, and somewhat muted.   Mood: \"good\"  Affect: Elevated, Labile, patient laughs when asked questions occasionally inappropriate to situation.  Thought Process: tangential, mildly disorganized. Occasionally made illogical statements.   Thought Content:  no suicidal ideation, no delusions elicited  Thought Perception: denies visual or auditory hallucinations, did not appear to be responding to hallucinatory stimuli  Cognition: unable to fully assess as patient refused to answer certain questions. Did not name correct hospital but was aware she was in a hospital being treated. Named correct month and day but incorrect year. Refused to give full name. Could only name 3 days of the week backwards before trailing off. Poor attention/concentration  Insight: Fair. Aware she is being treated and asked for meds because she \"needs to get better\"  Judgement: Poor.    CURRENT MEDICATIONS  Scheduled medications  cefTRIAXone, 2 g, intravenous, q24h  doxycycline, 100 mg, oral, q12h JEREMIAH  folic acid, 1 mg, oral, Daily  heparin (porcine), 5,000 Units, subcutaneous, q8h  insulin glargine, 15 Units, subcutaneous, Nightly  insulin lispro, 0-10 Units, subcutaneous, q4h  insulin lispro, " "3 Units, subcutaneous, TID with meals  levETIRAcetam, 750 mg, oral, BID  lisinopril, 20 mg, oral, Daily  multivitamin with minerals, 1 tablet, oral, Daily  oxygen, , inhalation, Continuous - 02/gases  potassium chloride, , ,   thiamine, 500 mg, intravenous, TID        Continuous medications       PRN medications  PRN medications: acetaminophen, dextrose 10 % in water (D10W), dextrose, glucagon, potassium chloride, valproate sodium     LABS  No results displayed because visit has over 200 results.          PSYCHIATRIC RISK ASSESSMENT  Acute Risk of Harm to Others is Considered: Low  Acute Risk of Harm to Self is Considered: Low    ASSESSMENT AND PLAN    Valentina Fontanez is a 58 y.o. female with no past psych hx and a past medical history of T2D who was admitted to [facility] on 10/1/23 for suspected seizures and unclear etiology of encephalopathy. Psychiatry was consulted on 10/3/2023 for agitation and altered mental status. Specifically interested in agitation recs that would not suppress seizures as pt is being monitored on EEG for epilepsy workup.     On interview, patient was agitated and unable to cooperate with interview, with wrist restraints. She asked repeatedly \"why are you here\", pulled at restraints and her lake, and tried to get out of bed. She did not appear to know where she was and when nursing team tried to redirect her, she asked \"why are you all lying\". Was not able to obtain collateral from mother.     On initial assessment, patient was agitated and unable to cooperate with interview. She did not appear oriented to person, place, or time. Presentation is concerning for delirium due to medical conditions (possible meningitis, metabolic acidosis/DKA, substance use/alcohol withdrawal) However, alcohol < 10 and utox positive only for benzos (given midazolam in ambulance) is less concerning for substance use.     10/3 1600 Reassessment: Pt was reassessed in the afternoon and was significantly less agitated. " "Still very encephalopathic, oriented only to self (oriented to location, date, and situation with prompting). Mood labile, tearful, slightly child-like. Mother at bedside. Mother (Aparna Fontanez [phone]) denies any history of seizures or psychiatric treatment, including inpatient hospitalization. Mother endorses some problematic drinking, but not consistent; recently worse s/p death of brother \"all day, every day.\"    10/4/23 update:     Reassessed this morning. Patient was not agitated but still encephalopathic. Only partially oriented to person, place, time (named wrong year and hospital but aware she was in hospital being treated. Refused to give name, but could give birthday). Impaired concentration/attention (could not name days of week backwards). Mood labile, elevated, laughs often. Patient endorsed previous depressive episode though denies current mood symptoms. Adamantly denies any suicidal ideation, intent, means or plan. Denies HI. Denies any experience of AH or VH. If QTc remains below 500, can consider antipsychotic (olanzapine) PRN for agitation. Patient may be appropriate for transfer to MPU for co-management should patient and surrogate decision maker (patient's mother) agree.     EKG (10/3/23): QTc of 472, HR 51     IMPRESSION  #delirium (multifactorial, possible infectious/metabolic/substance use/Precedex withdrawal)     RECOMMENDATIONS  Transfer to MPU  - Patient is appropriate for the Med-Psych Unit and can be transferred for co-management with primary team. Please note that the patient or surrogate decision maker must approve the transfer (unless the patient is awaiting inpatient psychiatry).   - Please place STAT transfer to [place] [id number] MS/Psych  - Patients being transferred must be assigned to a primary medical or surgical team.   - Patients being transferred should have agitation orders placed as recommended by  Psychiatry.       Safety:  - Patient does not currently meet criteria for " "inpatient psychiatric admission.   - Patient lacks the capacity to leave AMA at this time and thus cannot leave AMA. Call [facility]IOLET if patient attempts to leave AMA.  - To evaluate decision-making capacity, recommend use of the Capacity Evaluation Tool. Search “ IP Capacity Evaluation under SmartText\" unless the patient has a legal guardian, in which case all decisions per the legal guardian.  - Patient does not require a 1:1 sitter from a psychiatric perspective at this time.  - As with all hospitalized patients, would recommend delirium precautions, as below.     Work-up:  -none at this time     Medications:  - DISCONTINUE Depakote 125 mg PO/IV q6h PRN agitation  -if Qtc < 500, recommend Olanzapine 2.5mg PO/IM q8h PRN for agitation     Follow-up:  - Discussed recommendations with primary team.  - Psychiatry will continue to follow    DELIRIUM GUIDELINES:  1) Non-Pharmacological Measures: Please ensure blinds are open during day to allow sufficient daylight to enter room; maintain dark/quiet room at night with minimal interruptions; minimize daytime naps and maintain sleep/wake cycle as able; minimize excessive stimulation; if patient wears glasses or hearing aids, patient should have access to them as sensory deprivation can cause/worsen delirium; minimize room/staff changes; encourage interaction with familiar objects and frequent orientation.     2) Pharmacological Measures: Minimize use of benzodiazepines, anticholinergic medications, and opiates, as these can all exacerbate delirium. Ensure adequate treatment of pain while minimizing use of opioids to the extent possible.     3) Minimize use of restraints to situations where necessary to keep patient and staff safe and to prevent patient from removing lines, tubes, medical devices, dressings, etc.  Continue to regularly re-evaluate continued need for any ordered restraints.       Thank you for allowing us to participate in the care of this patient. Please " page [phone] with any questions or concerns.     Patient was seen with fellow Dr. Camacho and staffed/discussed with Dr. Drew, who agrees with above plan.    I agree with this excellent medical student note, with changes made directly into the note.    ALEXANDER GU

## 2023-10-04 NOTE — PROGRESS NOTES
Valentina Fontanez is a 58 y.o. female on day 3 of admission presenting with Altered mental status.    Subjective   Interval History: No fevers. Patient is extubated        Review of Systems  Review of systems unable to be obtained as patient was agitated     Objective   Range of Vitals (last 24 hours)  Heart Rate:  [56-97]   Temp:  [36.1 °C (97 °F)-37.4 °C (99.3 °F)]   Resp:  [12-21]   BP: (142-187)/()   SpO2:  [96 %-100 %]   Daily Weight  10/03/23 : 68.6 kg (151 lb 3.8 oz)    Body mass index is 24.42 kg/m².    Physical Exam  General: agitated  HEENT: no conjunctival injection. anicteric.  RESP extubated, no resp distress  Ext: No swelling of the LE b/l.   Neuro: agitated  Integumentary: no obvious lesions   Rheumatologic: No joint swelling or edema    Antibiotics  etomidate (Amidate) injection  rocuronium (ZeMuron) injection  sodium chloride 0.9 % bolus  propofol (Diprivan) infusion  insulin regular (HumuLIN, NovoLIN) bolus from bag 8 Units  insulin regular 100 unit/100 mL (1 unit/mL) in 0.9 % NaCl infusion  labetaloL (Normodyne,Trandate) injection 10 mg  etomidate (Amidate) 2 mg/mL injection  - Omnicell Override Pull  rocuronium (ZeMuron) 10 mg/mL injection  - Omnicell Override Pull  propofol (Diprivan) 10 mg/mL injection  - Omnicell Override Pull  insulin regular 100 unit/100 mL (1 unit/mL) in 0.9 % NaCl infusion  - Omnicell Override Pull  labetaloL (Normodyne,Trandate) 5 mg/mL injection  - Omnicell Override Pull  oxygen (O2) therapy  iohexol (OMNIPaque) 350 mg iodine/mL injection 130 mL  niCARdipine (Cardene) 40 mg in sodium chloride 200 mL (0.2 mg/mL) infusion (premix)  hydrALAZINE (Apresoline) injection 10 mg  potassium chloride IV 20 mEq  sodium chloride 0.9 % bolus 1,000 mL  piperacillin-tazobactam-dextrose (Zosyn) IV 4.5 g  vancomycin in dextrose 5 % (Vancocin) IVPB 2 g  folic acid (Folvite) tablet 1 mg  multivitamin with minerals 1 tablet  fentanyl (Sublimaze) 1000 mcg in sodium chloride 0.9% 100 mL (10  mcg/mL) infusion (premix)  potassium chloride IV 40 mEq  propofol (Diprivan) 10 mg/mL injection  - Omnicell Override Pull  oxygen (O2) therapy  propofol (Diprivan) injection  thiamine (Vitamin B1) 500 mg in dextrose 5% 100 mL IV  lactated Ringer's bolus 1,000 mL  potassium chloride IV 20 mEq  acetaminophen (Tylenol) tablet 650 mg  piperacillin-tazobactam-dextrose (Zosyn) IV 4.5 g  cefTRIAXone (Rocephin) 2 g IV in dextrose 5% 50 mL  acyclovir (Zovirax) 670 mg in dextrose 5% 100 mL IV  doxycycline (Vibramycin) in dextrose 5 % in water (D5W) 100 mL  mg  dextrose 50 % injection 25 g  glucagon (Glucagen) injection 1 mg  dextrose 10 % infusion  insulin lispro (HumaLOG) injection 0-10 Units  insulin glargine (Lantus) injection 6 Units  vancomycin in dextrose 5 % (Vancocin) IVPB 750 mg  acyclovir (Zovirax) 675 mg in dextrose 5% 100 mL IV  dexAMETHasone (Decadron) 10 mg in dextrose 5% 50 mL IV  potassium, sodium phosphates (Phos-NaK) 280-160-250 mg packet 1 packet  magnesium sulfate IV 2 g  acetaminophen (Tylenol) tablet 650 mg  magnesium sulfate 2 GM/50ML IV  - Omnicell Override Pull  ampicillin (Omnipen) in sodium chloride 0.9 % 100 mL IV 2 g  dexmedeTOMIDine in NS (Precedex) 400 mcg in 100 mL (4 mcg/mL) infusion  pantoprazole (ProtoNix) injection 40 mg  heparin (porcine) injection 5,000 Units  oxygen (O2) therapy  propofol (Diprivan) infusion  insulin lispro (HumaLOG) injection 5 Units  propofol (Diprivan) 10 mg/mL injection  - Omnicell Override Pull  surgical lubricant gel  - Omnicell Override Pull  insulin glargine (Lantus) injection 10 Units  lactated Ringer's bolus 500 mL  midazolam (Versed) injection 2 mg  doxycycline (Vibramycin) in dextrose 5 % in water (D5W) 100 mL  mg  midazolam (Versed) injection 2 mg  propofol (Diprivan) 10 mg/mL injection  - Omnicell Override Pull  propofol (Diprivan) 10 mg/mL injection  - Omnicell Override Pull  midazolam (Versed) injection 2 mg  levETIRAcetam (Keppra) tablet 750  "mg  gadoterate meglumine (Dotarem) injection 13.5 mL  levETIRAcetam (Keppra) 750 mg in sodium chloride 0.9% 100 mL IVPB  acyclovir (Zovirax) 675 mg in dextrose 5% 100 mL IV  LORazepam (Ativan) injection 2 mg  PHENobarbital (Luminal) injection 130 mg  levETIRAcetam (Keppra) 750 mg in sodium chloride 0.9% 100 mL IVPB  midazolam (Versed) injection 0.5 mg  PHENobarbital (Luminal) injection 65 mg  PHENobarbital (Luminal) injection 32.5 mg  OLANZapine (ZyPREXA) injection 2.5 mg  sterile water injection  - Omnicell Override Pull  lactated Ringer's bolus 1,000 mL  cefTRIAXone (Rocephin) 2 g IV in dextrose 5% 50 mL  white petrolatum (Aquaphor) 41 % ointment  - Omnicell Override Pull  insulin glargine (Lantus) injection 15 Units  dextrose 10 % infusion  insulin lispro (HumaLOG) injection 3 Units  valproate (Depacon) 125 mg in dextrose 5% 50 mL IV      Relevant Results  Labs  Results from last 72 hours   Lab Units 10/04/23  0635 10/03/23  0411   WBC AUTO x10*3/uL 16.4* 13.6*   HEMOGLOBIN g/dL 15.1 11.9*   HEMATOCRIT % 45.8 35.8*   PLATELETS AUTO x10*3/uL 264 188   NEUTROS PCT AUTO % 69.3 82.3   LYMPHS PCT AUTO % 23.6 11.2   MONOS PCT AUTO % 5.9 5.9   EOS PCT AUTO % 0.1 0.0       Results from last 72 hours   Lab Units 10/04/23  0635 10/03/23  0411 10/02/23  0254   SODIUM mmol/L 144 133* 138   POTASSIUM mmol/L 2.9* 3.9 3.5   CHLORIDE mmol/L 104 97* 100   CO2 mmol/L 25 24 21   BUN mg/dL 15 29* 24*   CREATININE mg/dL 0.53 0.93 1.07*   GLUCOSE mg/dL 84 280* 142*   CALCIUM mg/dL 10.1 9.2 9.1   ANION GAP mmol/L 18 16 21*   EGFR mL/min/1.73m*2 >90 71 60*   PHOSPHORUS mg/dL 2.2* 5.2* 2.4*       Results from last 72 hours   Lab Units 10/04/23  0635 10/03/23  0411 10/02/23  0254   ALBUMIN g/dL 3.5 3.2* 3.1*       Estimated Creatinine Clearance: 107 mL/min (by C-G formula based on SCr of 0.53 mg/dL).  No results found for: \"CRP\"  Microbiology  No results found for the last 14 days.  Microbiology  No results found for the last 14 days.   "   10/1 BC pending  10/1 MRSA nares positive  10/2 legionella negative  10/2 strep pneumococcosis negative  10/2 CSF WBC 27, 47 protein, 116, 34 neutrophils, 45 lymphocytes  10/2 CSF VDRL  10/2 molecular microbiology  10/2 CSF gram stain rare polymorphonuclear leukocytes, pending culture  10/2 HSV CSF negative         Imaging     Xray abdomen   IMPRESSION:  1.  Enteric tube is unchanged in position with the tip projecting  over proximal stomach and side hole projecting over distal esophagus.  Consider advancing.      Signed by: Waqas Anderson 10/2/2023 1:03 PM  Dictation workstation:   [id number]     CT angio  IMPRESSION:  1. No acute large territorial infarct or intracranial hemorrhage.  2. No evidence for significant stenosis or large branch vessel  cutoffs of the intracranial vessels. No evidence of traumatic  arterial injury.  3. No traumatic spondylolisthesis of the cervical spine.  4. Enteric tube coils within the pharynx and terminates within the  right piriform sinus and recommend repositioning.  5. Endotracheal tube with its tip terminating approximately 1.6 cm  above the level of the rubens.      CT chest abdomen pelvis  FINDINGS:  Please note that the evaluation of vessels, lymph nodes and organs is  limited without intravenous contrast.   CHEST:  There is an endotracheal tube 2.2 cm above the rubens.  MEDIASTINUM:  The heart is normal in size without pericardial effusion.  LUNGS/PLEURA:  There is no pleural effusion, pleural thickening, or pneumothorax.   The airways are patent.  There are bibasilar pulmonary infiltrates.  LYMPH NODES:  Thoracic lymph nodes are not enlarged.  ABDOMEN:     CT head  IMPRESSION:  1. No acute large territorial infarct or intracranial hemorrhage.  2. No evidence for significant stenosis or large branch vessel  cutoffs of the intracranial vessels. No evidence of traumatic  arterial injury.  3. No traumatic spondylolisthesis of the cervical spine.  4. Enteric tube coils  within the pharynx and terminates within the  right piriform sinus and recommend repositioning.  5. Endotracheal tube with its tip terminating approximately 1.6 cm  above the level of the rubens.    MRI    IMPRESSION:  1. No acute intracranial abnormality and no abnormal intracranial  enhancement or mass. No mass effect.  2. Chronic intracranial findings as above including findings of  probable chronic small vessel ischemic changes and probable old small  lacunar infarcts.        Assessment/Plan        Assessment and recommendations:     #Altered mental status  #Fevers     Patient with fever and altered mentation concerning for encephalitis, but now the suspicion is low after work-up. CSF looks less consistent with bacterial and might be more consistent with viral meningitis, although suspicion for meningitis is low given very minimal WBC elevation (27/mL) and protein, and glucose being not decreased. Likely was altered mentation due to DKA. Other possibility is viral meningitis (negative HSV on CSF and on meningitis panel) which largely cannot be treated unless this was HSV related which it does not appear to be     -Stop ceftriaxone  -Stop doxycyline  -Stop Acyclovir  -Check pending tests (including WNV CSF antibodies)     ID will sign off. If any questions regarding this patient please call Team pager.  Discussed with Dr. Danish Brown  Infectious disease, PGY IV  Team B pager [phone]  General ID pager [phone]

## 2023-10-04 NOTE — PROGRESS NOTES
Physical Therapy    Physical Therapy Evaluation    Patient Name: Valentina Fontnaez  MRN: [id number]  Today's Date: 10/4/2023   Time Calculation  Start Time: 0942  Stop Time: 0959  Time Calculation (min): 17 min    Assessment/Plan   PT Assessment  PT Assessment Results: Decreased strength, Decreased endurance, Impaired balance, Decreased mobility, Decreased coordination, Decreased cognition, Decreased safety awareness  Rehab Prognosis: Good  Barriers to Participation: Insight into problems  End of Session Communication: Bedside nurse, PCT/NA/CTA  Assessment Comment: 57yo female admitted after being found down presents with dec strength, balance, endurance, & imp cognition limiting functional mobility. Pt would benefit from continued therapy to address these deficits and improve safety and indep.  End of Session Patient Position: Bed, 4 rail up, Alarm off, caregiver present  IP OR SWING BED PT PLAN  Inpatient or Swing Bed: Inpatient  PT Plan  Treatment/Interventions: Bed mobility, Transfer training, Gait training, Stair training, Balance training, Neuromuscular re-education, Strengthening, Endurance training, Therapeutic exercise, Therapeutic activity, Positioning  PT Plan: Skilled PT  PT Frequency: 4 times per week  PT Discharge Recommendations: High intensity level of continued care      Subjective   General Visit Information:  General  Reason for Referral: presented to the ED after being found down by mother  Past Medical History Relevant to Rehab: DM  Family/Caregiver Present: Yes (sitter)  Prior to Session Communication: Bedside nurse  Patient Position Received: Bed, 4 rail up, Alarm off, caregiver present  General Comment: Pt supine in bed upon PT arrival, cooperative, willing to participate in therapy assessment  Home Living:  Home Living  Type of Home: House  Lives With: Parent(s) (mom)  Home Adaptive Equipment: None  Home Layout: Two level  Prior Level of Function:  Prior Function Per Pt/Caregiver Report  Level of  "[place]: Independent with ADLs and functional transfers  ADL Assistance: Independent  Ambulatory Assistance: Independent  Vocational: Retired  Leisure: music  Precautions:  Precautions  Medical Precautions: Fall precautions, Seizure precautions    Objective   Pain:  Pain Assessment  Pain Assessment: 0-10  Pain Score: 0 - No pain  Cognition:  Cognition  Overall Cognitive Status: Impaired  Orientation Level:  (Oriented to self/BD, month, hospital; states year is \"2003\")  Insight: Moderate  Impulsive: Moderately    Activity Tolerance  Endurance: Endurance does not limit participation in activity    Sensation  Light Touch: No apparent deficits    Coordination  Movements are Fluid and Coordinated: No  Finger to Nose: Impaired (slowed with dec accuracy; inc cueing 2/2 difficulty following commands)  Alternating Toe Taps: Bradykinesia  Heel to Shin: Impaired    Postural Control  Postural Control: Within Functional Limits    Static Sitting Balance  Static Sitting-Balance Support: Feet supported  Static Sitting-Level of Assistance: Contact guard    Static Standing Balance  Static Standing-Balance Support: Right upper extremity supported  Static Standing-Level of Assistance: Minimum assistance  Dynamic Standing Balance  Dynamic Standing-Balance Support: Right upper extremity supported  Functional Assessments:  Bed Mobility  Bed Mobility: Yes  Bed Mobility 1  Bed Mobility 1: Supine to sitting, Sitting to supine  Level of Assistance 1: Minimum assistance, Maximum verbal cues    Transfers  Transfer: Yes  Transfer 1  Technique 1: Sit to stand, Stand to sit  Transfer Level of Assistance 1: Hand held assistance, Minimum assistance, Moderate verbal cues    Ambulation/Gait Training  Ambulation/Gait Training Performed: Yes  Ambulation/Gait Training 1  Surface 1: Level tile  Assistance 1: Hand held assistance, Minimum assistance, Maximum verbal cues  Quality of Gait 1:  (gait is ataxic appearing, discoordinated stepping with " mild-mod path deviation, lateral & post LOBs)  Comments/Distance (ft) 1: 8' total  Extremity/Trunk Assessments:  RUE   RUE : Within Functional Limits  LUE   LUE: Within Functional Limits  RLE   RLE : Within Functional Limits  LLE   LLE : Within Functional Limits  Outcome Measures:  [facility] Basic Mobility  Turning from your back to your side while in a flat bed without using bedrails: A little  Moving from lying on your back to sitting on the side of a flat bed without using bedrails: A little  Moving to and from bed to chair (including a wheelchair): A little  Standing up from a chair using your arms (e.g. wheelchair or bedside chair): A little  To walk in hospital room: A little  Climbing 3-5 steps with railing: Total  Basic Mobility - Total Score: 16    Encounter Problems       Encounter Problems (Active)       Balance       STG - Maintains dynamic sitting & standing balance with upper extremity support and SBA (Progressing)       Start:  10/04/23    Expected End:  10/18/23               Mobility       STG - Patient will ambulate >100' with CGA using LRD (Progressing)       Start:  10/04/23    Expected End:  10/18/23               Pain - Adult          Transfers       STG - Patient will perform bed mobility INDEP (Progressing)       Start:  10/04/23    Expected End:  10/18/23            STG - Patient will transfer sit to and from stand with SBA using LRD as needed (Progressing)       Start:  10/04/23    Expected End:  10/18/23

## 2023-10-05 ENCOUNTER — APPOINTMENT (OUTPATIENT)
Dept: RADIOLOGY | Facility: HOSPITAL | Age: 58
DRG: 097 | End: 2023-10-05
Payer: COMMERCIAL

## 2023-10-05 LAB
ADENOVIRUS RVP, VIRC: NOT DETECTED
ALBUMIN SERPL BCP-MCNC: 3.2 G/DL (ref 3.4–5)
ALBUMIN SERPL BCP-MCNC: 3.5 G/DL (ref 3.4–5)
ANION GAP BLDV CALCULATED.4IONS-SCNC: 9 MMOL/L (ref 10–25)
ANION GAP SERPL CALC-SCNC: 17 MMOL/L (ref 10–20)
ANION GAP SERPL CALC-SCNC: 26 MMOL/L (ref 10–20)
BACTERIA BLD CULT: NORMAL
BASE EXCESS BLDV CALC-SCNC: 7 MMOL/L (ref -2–3)
BODY TEMPERATURE: ABNORMAL
BUN SERPL-MCNC: 10 MG/DL (ref 6–23)
BUN SERPL-MCNC: 8 MG/DL (ref 6–23)
CA-I BLDV-SCNC: 1.12 MMOL/L (ref 1.1–1.33)
CALCIUM SERPL-MCNC: 9.2 MG/DL (ref 8.6–10.6)
CALCIUM SERPL-MCNC: 9.7 MG/DL (ref 8.6–10.6)
CHLORIDE BLDV-SCNC: 99 MMOL/L (ref 98–107)
CHLORIDE SERPL-SCNC: 100 MMOL/L (ref 98–107)
CHLORIDE SERPL-SCNC: 102 MMOL/L (ref 98–107)
CO2 SERPL-SCNC: 16 MMOL/L (ref 21–32)
CO2 SERPL-SCNC: 25 MMOL/L (ref 21–32)
CREAT SERPL-MCNC: 0.53 MG/DL (ref 0.5–1.05)
CREAT SERPL-MCNC: 0.63 MG/DL (ref 0.5–1.05)
ENTEROVIRUS/RHINOVIRUS RVP, VIRC: NOT DETECTED
ERYTHROCYTE [DISTWIDTH] IN BLOOD BY AUTOMATED COUNT: 12.3 % (ref 11.5–14.5)
GFR SERPL CREATININE-BSD FRML MDRD: >90 ML/MIN/1.73M*2
GFR SERPL CREATININE-BSD FRML MDRD: >90 ML/MIN/1.73M*2
GLUCOSE BLD MANUAL STRIP-MCNC: 116 MG/DL (ref 74–99)
GLUCOSE BLD MANUAL STRIP-MCNC: 234 MG/DL (ref 74–99)
GLUCOSE BLD MANUAL STRIP-MCNC: 254 MG/DL (ref 74–99)
GLUCOSE BLD MANUAL STRIP-MCNC: 271 MG/DL (ref 74–99)
GLUCOSE BLDV-MCNC: 316 MG/DL (ref 74–99)
GLUCOSE SERPL-MCNC: 214 MG/DL (ref 74–99)
GLUCOSE SERPL-MCNC: 267 MG/DL (ref 74–99)
HCO3 BLDV-SCNC: 30.2 MMOL/L (ref 22–26)
HCT VFR BLD AUTO: 46.9 % (ref 36–46)
HCT VFR BLD EST: 47 % (ref 36–46)
HGB BLD-MCNC: 14 G/DL (ref 12–16)
HGB BLDV-MCNC: 15.7 G/DL (ref 12–16)
HUMAN BOCAVIRUS RVP, VIRC: NOT DETECTED
HUMAN CORONAVIRUS RVP, VIRC: NOT DETECTED
INFLUENZA A , VIRC: NOT DETECTED
INFLUENZA A H1N1-09 , VIRC: NOT DETECTED
INFLUENZA B PCR, VIRC: NOT DETECTED
LACTATE BLDV-SCNC: 1.9 MMOL/L (ref 0.4–2)
MAGNESIUM SERPL-MCNC: 1.51 MG/DL (ref 1.6–2.4)
MAGNESIUM SERPL-MCNC: 2.22 MG/DL (ref 1.6–2.4)
MCH RBC QN AUTO: 31.7 PG (ref 26–34)
MCHC RBC AUTO-ENTMCNC: 29.9 G/DL (ref 32–36)
MCV RBC AUTO: 106 FL (ref 80–100)
METAPNEUMOVIRUS , VIRC: NOT DETECTED
NRBC BLD-RTO: 0 /100 WBCS (ref 0–0)
OXYHGB MFR BLDV: 71.4 % (ref 45–75)
PARAINFLUENZA PCR, VIRC: NOT DETECTED
PCO2 BLDV: 37 MM HG (ref 41–51)
PH BLDV: 7.52 PH (ref 7.33–7.43)
PHOSPHATE SERPL-MCNC: 2.1 MG/DL (ref 2.5–4.9)
PHOSPHATE SERPL-MCNC: 2.6 MG/DL (ref 2.5–4.9)
PLATELET # BLD AUTO: 262 X10*3/UL (ref 150–450)
PMV BLD AUTO: 11.4 FL (ref 7.5–11.5)
PO2 BLDV: 42 MM HG (ref 35–45)
POTASSIUM BLDV-SCNC: 3.2 MMOL/L (ref 3.5–5.3)
POTASSIUM SERPL-SCNC: 3.1 MMOL/L (ref 3.5–5.3)
POTASSIUM SERPL-SCNC: 4 MMOL/L (ref 3.5–5.3)
RBC # BLD AUTO: 4.41 X10*6/UL (ref 4–5.2)
RSV PCR, RVP, VIRC: NOT DETECTED
SAO2 % BLDV: 73 % (ref 45–75)
SODIUM BLDV-SCNC: 135 MMOL/L (ref 136–145)
SODIUM SERPL-SCNC: 139 MMOL/L (ref 136–145)
SODIUM SERPL-SCNC: 140 MMOL/L (ref 136–145)
WBC # BLD AUTO: 11 X10*3/UL (ref 4.4–11.3)
WNV IGG SER IA-ACNC: 0.04 IV
WNV IGM SER IA-ACNC: 0 IV

## 2023-10-05 PROCEDURE — 2500000004 HC RX 250 GENERAL PHARMACY W/ HCPCS (ALT 636 FOR OP/ED)

## 2023-10-05 PROCEDURE — 2500000001 HC RX 250 WO HCPCS SELF ADMINISTERED DRUGS (ALT 637 FOR MEDICARE OP)

## 2023-10-05 PROCEDURE — 96372 THER/PROPH/DIAG INJ SC/IM: CPT

## 2023-10-05 PROCEDURE — 36415 COLL VENOUS BLD VENIPUNCTURE: CPT

## 2023-10-05 PROCEDURE — 71045 X-RAY EXAM CHEST 1 VIEW: CPT

## 2023-10-05 PROCEDURE — 96372 THER/PROPH/DIAG INJ SC/IM: CPT | Performed by: STUDENT IN AN ORGANIZED HEALTH CARE EDUCATION/TRAINING PROGRAM

## 2023-10-05 PROCEDURE — 80069 RENAL FUNCTION PANEL: CPT

## 2023-10-05 PROCEDURE — 2500000004 HC RX 250 GENERAL PHARMACY W/ HCPCS (ALT 636 FOR OP/ED): Performed by: STUDENT IN AN ORGANIZED HEALTH CARE EDUCATION/TRAINING PROGRAM

## 2023-10-05 PROCEDURE — 2500000002 HC RX 250 W HCPCS SELF ADMINISTERED DRUGS (ALT 637 FOR MEDICARE OP, ALT 636 FOR OP/ED)

## 2023-10-05 PROCEDURE — 85027 COMPLETE CBC AUTOMATED: CPT

## 2023-10-05 PROCEDURE — 1100000001 HC PRIVATE ROOM DAILY

## 2023-10-05 PROCEDURE — 2500000001 HC RX 250 WO HCPCS SELF ADMINISTERED DRUGS (ALT 637 FOR MEDICARE OP): Performed by: STUDENT IN AN ORGANIZED HEALTH CARE EDUCATION/TRAINING PROGRAM

## 2023-10-05 PROCEDURE — 99254 IP/OBS CNSLTJ NEW/EST MOD 60: CPT | Performed by: STUDENT IN AN ORGANIZED HEALTH CARE EDUCATION/TRAINING PROGRAM

## 2023-10-05 PROCEDURE — 99232 SBSQ HOSP IP/OBS MODERATE 35: CPT

## 2023-10-05 PROCEDURE — 82947 ASSAY GLUCOSE BLOOD QUANT: CPT

## 2023-10-05 PROCEDURE — 99233 SBSQ HOSP IP/OBS HIGH 50: CPT | Performed by: PSYCHIATRY & NEUROLOGY

## 2023-10-05 PROCEDURE — 83735 ASSAY OF MAGNESIUM: CPT

## 2023-10-05 RX ORDER — ACETAMINOPHEN 325 MG/1
650 TABLET ORAL EVERY 6 HOURS PRN
Qty: 30 TABLET | Refills: 0 | Status: CANCELLED | OUTPATIENT
Start: 2023-10-05

## 2023-10-05 RX ORDER — LISINOPRIL 40 MG/1
40 TABLET ORAL DAILY
Qty: 60 TABLET | Refills: 2 | Status: CANCELLED | OUTPATIENT
Start: 2023-10-06

## 2023-10-05 RX ORDER — HYDRALAZINE HYDROCHLORIDE 20 MG/ML
10 INJECTION INTRAMUSCULAR; INTRAVENOUS ONCE
Status: COMPLETED | OUTPATIENT
Start: 2023-10-05 | End: 2023-10-05

## 2023-10-05 RX ORDER — CARVEDILOL 3.12 MG/1
6.25 TABLET ORAL 2 TIMES DAILY
Qty: 60 TABLET | Refills: 1 | Status: CANCELLED
Start: 2023-10-06

## 2023-10-05 RX ORDER — CARVEDILOL 3.12 MG/1
3.12 TABLET ORAL 2 TIMES DAILY
Status: DISCONTINUED | OUTPATIENT
Start: 2023-10-05 | End: 2023-10-06

## 2023-10-05 RX ORDER — LANOLIN ALCOHOL/MO/W.PET/CERES
100 CREAM (GRAM) TOPICAL DAILY
Status: DISCONTINUED | OUTPATIENT
Start: 2023-10-05 | End: 2023-10-07 | Stop reason: HOSPADM

## 2023-10-05 RX ORDER — FOLIC ACID 1 MG/1
1 TABLET ORAL DAILY
Qty: 60 TABLET | Refills: 2 | Status: CANCELLED
Start: 2023-10-06

## 2023-10-05 RX ORDER — SODIUM,POTASSIUM PHOSPHATES 280-250MG
2 POWDER IN PACKET (EA) ORAL ONCE
Status: DISCONTINUED | OUTPATIENT
Start: 2023-10-05 | End: 2023-10-07 | Stop reason: HOSPADM

## 2023-10-05 RX ORDER — LEVETIRACETAM 750 MG/1
750 TABLET ORAL 2 TIMES DAILY
Qty: 90 TABLET | Refills: 3 | Status: CANCELLED
Start: 2023-10-06

## 2023-10-05 RX ORDER — ENOXAPARIN SODIUM 100 MG/ML
40 INJECTION SUBCUTANEOUS DAILY
Status: DISCONTINUED | OUTPATIENT
Start: 2023-10-05 | End: 2023-10-07 | Stop reason: HOSPADM

## 2023-10-05 RX ORDER — MAGNESIUM SULFATE HEPTAHYDRATE 40 MG/ML
2 INJECTION, SOLUTION INTRAVENOUS ONCE
Status: COMPLETED | OUTPATIENT
Start: 2023-10-05 | End: 2023-10-05

## 2023-10-05 RX ORDER — INSULIN GLARGINE 100 [IU]/ML
15 INJECTION, SOLUTION SUBCUTANEOUS NIGHTLY
Status: DISCONTINUED | OUTPATIENT
Start: 2023-10-05 | End: 2023-10-06

## 2023-10-05 RX ORDER — POTASSIUM CHLORIDE 1.5 G/1.58G
40 POWDER, FOR SOLUTION ORAL ONCE
Status: COMPLETED | OUTPATIENT
Start: 2023-10-05 | End: 2023-10-05

## 2023-10-05 RX ORDER — POTASSIUM CHLORIDE 1.5 G/1.58G
40 POWDER, FOR SOLUTION ORAL 2 TIMES DAILY
Status: DISCONTINUED | OUTPATIENT
Start: 2023-10-05 | End: 2023-10-05

## 2023-10-05 RX ADMIN — FOLIC ACID 1 MG: 1 TABLET ORAL at 09:13

## 2023-10-05 RX ADMIN — INSULIN LISPRO 5 UNITS: 100 INJECTION, SOLUTION INTRAVENOUS; SUBCUTANEOUS at 09:15

## 2023-10-05 RX ADMIN — ENOXAPARIN SODIUM 40 MG: 40 INJECTION SUBCUTANEOUS at 20:18

## 2023-10-05 RX ADMIN — THIAMINE HCL TAB 100 MG 100 MG: 100 TAB at 13:32

## 2023-10-05 RX ADMIN — HYDRALAZINE HYDROCHLORIDE 25 MG: 25 TABLET ORAL at 02:05

## 2023-10-05 RX ADMIN — LISINOPRIL 40 MG: 40 TABLET ORAL at 09:13

## 2023-10-05 RX ADMIN — LEVETIRACETAM 750 MG: 750 TABLET, FILM COATED ORAL at 20:18

## 2023-10-05 RX ADMIN — INSULIN LISPRO 5 UNITS: 100 INJECTION, SOLUTION INTRAVENOUS; SUBCUTANEOUS at 17:16

## 2023-10-05 RX ADMIN — THIAMINE HYDROCHLORIDE 500 MG: 100 INJECTION, SOLUTION INTRAMUSCULAR; INTRAVENOUS at 06:14

## 2023-10-05 RX ADMIN — DOXYCYCLINE HYCLATE 100 MG: 100 TABLET, COATED ORAL at 09:13

## 2023-10-05 RX ADMIN — CEFTRIAXONE SODIUM 2 G: 2 INJECTION, SOLUTION INTRAVENOUS at 02:57

## 2023-10-05 RX ADMIN — POTASSIUM CHLORIDE 40 MEQ: 1.5 POWDER, FOR SOLUTION ORAL at 13:31

## 2023-10-05 RX ADMIN — Medication 1 TABLET: at 09:14

## 2023-10-05 RX ADMIN — CARVEDILOL 3.12 MG: 3.12 TABLET, FILM COATED ORAL at 20:12

## 2023-10-05 RX ADMIN — CARVEDILOL 3.12 MG: 3.12 TABLET, FILM COATED ORAL at 09:13

## 2023-10-05 RX ADMIN — INSULIN LISPRO 6 UNITS: 100 INJECTION, SOLUTION INTRAVENOUS; SUBCUTANEOUS at 17:14

## 2023-10-05 RX ADMIN — HEPARIN SODIUM 5000 UNITS: 5000 INJECTION INTRAVENOUS; SUBCUTANEOUS at 09:14

## 2023-10-05 RX ADMIN — HEPARIN SODIUM 5000 UNITS: 5000 INJECTION INTRAVENOUS; SUBCUTANEOUS at 04:38

## 2023-10-05 RX ADMIN — HYDRALAZINE HYDROCHLORIDE 10 MG: 20 INJECTION INTRAMUSCULAR; INTRAVENOUS at 05:22

## 2023-10-05 RX ADMIN — INSULIN LISPRO 9 UNITS: 100 INJECTION, SOLUTION INTRAVENOUS; SUBCUTANEOUS at 09:19

## 2023-10-05 RX ADMIN — LEVETIRACETAM 750 MG: 750 TABLET, FILM COATED ORAL at 09:14

## 2023-10-05 RX ADMIN — MAGNESIUM SULFATE HEPTAHYDRATE 2 G: 40 INJECTION, SOLUTION INTRAVENOUS at 17:17

## 2023-10-05 RX ADMIN — INSULIN GLARGINE 15 UNITS: 100 INJECTION, SOLUTION SUBCUTANEOUS at 20:19

## 2023-10-05 ASSESSMENT — ENCOUNTER SYMPTOMS
ARTHRALGIAS: 0
DYSPHORIC MOOD: 0
NECK STIFFNESS: 0
HALLUCINATIONS: 0
DECREASED CONCENTRATION: 1
WHEEZING: 0
NUMBNESS: 0
LIGHT-HEADEDNESS: 0
DIARRHEA: 0
SINUS PRESSURE: 0
FLANK PAIN: 0
AGITATION: 0
PALPITATIONS: 0
CONFUSION: 1
VOMITING: 0
ABDOMINAL PAIN: 0
FATIGUE: 0
BLOOD IN STOOL: 0
WEAKNESS: 0
HEADACHES: 0
JOINT SWELLING: 0
UNEXPECTED WEIGHT CHANGE: 0
TROUBLE SWALLOWING: 0
DIFFICULTY URINATING: 0
COLOR CHANGE: 0
DIZZINESS: 0
CHILLS: 0
NERVOUS/ANXIOUS: 0
SHORTNESS OF BREATH: 0
NAUSEA: 0
DYSURIA: 0
BACK PAIN: 0
COUGH: 0
CONSTIPATION: 0
FEVER: 0

## 2023-10-05 ASSESSMENT — PAIN - FUNCTIONAL ASSESSMENT
PAIN_FUNCTIONAL_ASSESSMENT: 0-10
PAIN_FUNCTIONAL_ASSESSMENT: 0-10
PAIN_FUNCTIONAL_ASSESSMENT: WONG-BAKER FACES

## 2023-10-05 ASSESSMENT — COLUMBIA-SUICIDE SEVERITY RATING SCALE - C-SSRS
6. HAVE YOU EVER DONE ANYTHING, STARTED TO DO ANYTHING, OR PREPARED TO DO ANYTHING TO END YOUR LIFE?: NO
2. HAVE YOU ACTUALLY HAD ANY THOUGHTS OF KILLING YOURSELF?: NO
1. SINCE LAST CONTACT, HAVE YOU WISHED YOU WERE DEAD OR WISHED YOU COULD GO TO SLEEP AND NOT WAKE UP?: NO

## 2023-10-05 ASSESSMENT — PAIN SCALES - GENERAL
PAINLEVEL_OUTOF10: 0 - NO PAIN

## 2023-10-05 ASSESSMENT — PAIN DESCRIPTION - DESCRIPTORS: DESCRIPTORS: ACHING;SORE

## 2023-10-05 ASSESSMENT — PAIN SCALES - WONG BAKER: WONGBAKER_NUMERICALRESPONSE: HURTS LITTLE MORE

## 2023-10-05 NOTE — PROGRESS NOTES
"Valentina Fontanez is a 58 y.o. female on day 4 of admission presenting with Altered mental status.    Subjective   Patient examined this AM in NAD resting comfortably. Patient pulled out IVs last night but said it was because she was trying to leave her bed to shut her room door due to noise. She denies any symptoms of high BP like chest pain, headache, vision changes. Also denies f/c n/v/d SOB abdominal pain. Patient is still confused as to where she is and what year it is but has no complaints and wants to know when she can leave.     Review of Systems   Review of Systems   Constitutional:  Negative for chills, fatigue, fever and unexpected weight change.   Eyes:  Negative for visual disturbance.   Respiratory:  Negative for cough and shortness of breath.    Cardiovascular:  Negative for chest pain, palpitations and leg swelling.   Gastrointestinal:  Negative for abdominal pain, blood in stool, constipation, diarrhea, nausea and vomiting.   Genitourinary:  Negative for dysuria and flank pain.   Musculoskeletal:  Negative for arthralgias, back pain and joint swelling.   Skin:  Negative for pallor and rash.   Neurological:  Negative for dizziness, syncope, weakness, light-headedness, numbness and headaches.   Psychiatric/Behavioral:  Positive for confusion. Negative for dysphoric mood. The patient is not nervous/anxious.           Objective     Last Recorded Vitals  Blood pressure (!) 187/112, pulse 104, temperature 36.7 °C (98.1 °F), temperature source Temporal, resp. rate 20, height 1.676 m (5' 5.98\"), weight 68.6 kg (151 lb 3.8 oz), SpO2 96 %.  Intake/Output last 3 Shifts:  I/O last 3 completed shifts:  In: 350 (5.1 mL/kg) [IV Piggyback:350]  Out: 3712 (54.1 mL/kg) [Urine:3710 (1.5 mL/kg/hr); Other:2]  Weight: 68.6 kg     Physical Exam  Constitutional:       General: She is not in acute distress.     Appearance: Normal appearance.   HENT:      Head: Normocephalic and atraumatic.      Mouth/Throat:      Mouth: Mucous " membranes are moist.      Pharynx: Oropharynx is clear.   Eyes:      General: No scleral icterus.     Extraocular Movements: Extraocular movements intact.      Conjunctiva/sclera: Conjunctivae normal.      Pupils: Pupils are equal, round, and reactive to light.   Cardiovascular:      Rate and Rhythm: Normal rate and regular rhythm.      Pulses: Normal pulses.      Heart sounds: Normal heart sounds. No murmur heard.     No friction rub. No gallop.   Pulmonary:      Effort: Pulmonary effort is normal.      Breath sounds: Normal breath sounds. No wheezing, rhonchi or rales.   Abdominal:      General: Abdomen is flat. Bowel sounds are normal. There is no distension.      Palpations: Abdomen is soft.      Tenderness: There is no abdominal tenderness. There is no right CVA tenderness, left CVA tenderness, guarding or rebound.   Musculoskeletal:      Cervical back: Normal range of motion and neck supple.      Right lower leg: No edema.      Left lower leg: No edema.   Skin:     General: Skin is warm and dry.      Findings: No rash.   Neurological:      General: No focal deficit present.      Mental Status: She is alert. She is disoriented.   Psychiatric:      Comments: Endorses depressed mood about current condition and brother's recent death         Relevant Results    Labs    Results from last 7 days   Lab Units 10/04/23  0635 10/03/23  0411 10/01/23  1124   WBC AUTO x10*3/uL 16.4* 13.6* 14.6*   HEMOGLOBIN g/dL 15.1 11.9* 14.9   HEMATOCRIT % 45.8 35.8* 42.5   PLATELETS AUTO x10*3/uL 264 188 265            Results from last 7 days   Lab Units 10/05/23  1040 10/04/23  1954 10/04/23  0635   SODIUM mmol/L 139 140 144   POTASSIUM mmol/L 3.1* 4.0 2.9*   CHLORIDE mmol/L 100 102 104   CO2 mmol/L 25 16* 25   BUN mg/dL 8 10 15   CREATININE mg/dL 0.53 0.63 0.53   CALCIUM mg/dL 9.2 9.7 10.1     Results from last 7 days   Lab Units 10/01/23  1033   ALK PHOS U/L 71   BILIRUBIN TOTAL mg/dL 1.0   PROTEIN TOTAL g/dL 7.2   ALT U/L 16    AST U/L 24      Results from last 7 days   Lab Units 10/01/23  1033   INR  1.1        Medications  Scheduled medications  carvedilol, 3.125 mg, oral, BID  folic acid, 1 mg, oral, Daily  heparin (porcine), 5,000 Units, subcutaneous, q8h  insulin glargine, 15 Units, subcutaneous, Nightly  insulin lispro, 0-15 Units, subcutaneous, TID with meals  insulin lispro, 5 Units, subcutaneous, TID with meals  levETIRAcetam, 750 mg, oral, BID  lisinopril, 40 mg, oral, Daily  multivitamin with minerals, 1 tablet, oral, Daily  oxygen, , inhalation, Continuous - 02/gases  thiamine, 100 mg, oral, Daily      Continuous medications     PRN medications  PRN medications: acetaminophen, dextrose 10 % in water (D10W), dextrose, glucagon, hydrALAZINE, OLANZapine     Imaging  XR chest 1 view   Final Result   1. Ill-defined and faint airspace opacity in bilateral perihilar   region. Findings might be atelectatic, however correlate with concern   for infection. Recommend follow-up radiograph.                  Signed by: Waqas Anderson 10/5/2023 9:42 AM   Dictation workstation:   [id number]      MR brain w and wo IV contrast   Final Result   1. No acute intracranial abnormality and no abnormal intracranial   enhancement or mass. No mass effect.   2. Chronic intracranial findings as above including findings of   probable chronic small vessel ischemic changes and probable old small   lacunar infarcts.        I personally reviewed the images/study and I agree with the findings   as stated. This study was interpreted at [place], [place].        MACRO:   None.        Signed by: Price Posey 10/3/2023 1:23 AM   Dictation workstation:   [id number]      XR abdomen 1 view   Final Result   1.  Enteric tube projecting over the gastric fundus.        Signed by: Waqas Anderson 10/2/2023 2:40 PM   Dictation workstation:   [id number]      XR abdomen 1 view   Final Result   1.  Enteric tube is  unchanged in position with the tip projecting   over proximal stomach and side hole projecting over distal esophagus.   Consider advancing.        Signed by: Waqas Anderson 10/2/2023 1:03 PM   Dictation workstation:   [id number]      XR abdomen 1 view   Final Result   1.  Enteric tube is in place with the tip projecting over distal   esophagus. Consider adjustment/advancement.        Signed by: Waqas Anderson 10/2/2023 12:11 PM   Dictation workstation:   [id number]      XR abdomen 1 view   Final Result   1.  Enteric tube as described above. Consider advancement.   2. Nonobstructive bowel gas pattern.        Signed by: Waqas Anderson 10/2/2023 7:28 AM   Dictation workstation:   [id number]      XR chest 1 view   Final Result   1.  Enteric tube with tip overlying the expected location of the   gastroesophageal junction, repositioning is recommended.   2. Faint airspace opacity in lung bases, likely atelectatic.   3. Persistent elevation of the right hemidiaphragm.   4.  Additional medical devices as described above.        I personally reviewed the images/study and I agree with the findings   as stated. This study was interpreted at [place], [place].        MACRO:   None        Signed by: Waqas Anderson 10/1/2023 8:38 PM   Dictation workstation:   [id number]      XR abdomen 1 view   Final Result   1. Enteric tube tip located in the region of the distal esophagus.   This should be advanced.   2. Nonspecific bowel gas pattern. No definite evidence of mechanical   obstruction..   Signed by Johnny Medina MD      CT head W O contrast trauma protocol   Final Result   1. No acute large territorial infarct or intracranial hemorrhage.   2. No evidence for significant stenosis or large branch vessel   cutoffs of the intracranial vessels. No evidence of traumatic   arterial injury.   3. No traumatic spondylolisthesis of the cervical spine.   4. Enteric tube  coils within the pharynx and terminates within the   right piriform sinus and recommend repositioning.   5. Endotracheal tube with its tip terminating approximately 1.6 cm   above the level of the rubens.             I personally reviewed the images/study, and I agree with the findings   as stated above. This study was interpreted at [place], [place].        MACRO:   None             Dictation workstation:   [id number]      CT cervical spine wo IV contrast   Final Result   1. No acute large territorial infarct or intracranial hemorrhage.   2. No evidence for significant stenosis or large branch vessel   cutoffs of the intracranial vessels. No evidence of traumatic   arterial injury.   3. No traumatic spondylolisthesis of the cervical spine.   4. Enteric tube coils within the pharynx and terminates within the   right piriform sinus and recommend repositioning.   5. Endotracheal tube with its tip terminating approximately 1.6 cm   above the level of the rubens.             I personally reviewed the images/study, and I agree with the findings   as stated above. This study was interpreted at [place], [place].        MACRO:   None             Dictation workstation:   [id number]      CT thoracic spine wo IV contrast   Final Result   Bibasilar pulmonary infiltrates.   Hepatic steatosis.   Fibroid uterus.   Otherwise unremarkable CT scan of the chest, abdomen, pelvis, thoracic   and lumbosacral spines. There are no acute fractures.   Signed by Cory Crockett MD      CT lumbar spine wo IV contrast   Final Result   Bibasilar pulmonary infiltrates.   Hepatic steatosis.   Fibroid uterus.   Otherwise unremarkable CT scan of the chest, abdomen, pelvis, thoracic   and lumbosacral spines. There are no acute fractures.   Signed by Cory Crockett MD      CT chest abdomen pelvis w IV contrast   Final Result   Bibasilar pulmonary infiltrates.    Hepatic steatosis.   Fibroid uterus.   Otherwise unremarkable CT scan of the chest, abdomen, pelvis, thoracic   and lumbosacral spines. There are no acute fractures.   Signed by Cory Crockett MD      CT angio head neck w and wo IV contrast   Final Result   1. No acute large territorial infarct or intracranial hemorrhage.   2. No evidence for significant stenosis or large branch vessel   cutoffs of the intracranial vessels. No evidence of traumatic   arterial injury.   3. No traumatic spondylolisthesis of the cervical spine.   4. Enteric tube coils within the pharynx and terminates within the   right piriform sinus and recommend repositioning.   5. Endotracheal tube with its tip terminating approximately 1.6 cm   above the level of the rubens.             I personally reviewed the images/study, and I agree with the findings   as stated above. This study was interpreted at [place], [place].        MACRO:   None             Dictation workstation:   [id number]      XR pelvis 1-2 views   Final Result   No acute fracture or dislocation..   Signed by Johnny Medina MD      XR chest 1 view   Final Result   No focal pulmonary consolidation pleural effusions..   Signed by Johnny Medina MD               Assessment/Plan   Principal Problem:    Altered mental status  Active Problems:    Fever    Abnormal CSF cytology    Aspiration pneumonia of both lower lobes (CMS/HCC)    Valentina Fontanez is a 54 yo F with reported T2D (per mother, no records available) who presented to the ED after being found down by mother the morning of admission. Pt reportedly seized en route to hospital, s/p IM Versed by EMS. GCS 4 in ED, s/p intubation, admitted to MICU. Initially treated with insulin gtt empirically for hyperglycemia, but presentation not felt to be DKA.  LP done c/w viral encephalitis, but PCR panel negative (including HSV). Previously on vanc/ceftriaxone/doxy/acyclovir, per ID can narrow  treatment to ceftriaxone/doxy for CAP/aspiration pneumonia. 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     Updates 10/5:  -Remains confused but alertness is improved  -Stopped abx per ID recs  -Adding back home BP meds, remains hypertensive      #Acute encephalopathy  #Seizure  #Viral meningoencephalitis  ::Encephalopathy, seizures possibly secondary to viral encephalitis vs substance withdrawal  ::CT Head, CTA Head/Neck, MRI Brain negative for acute abnormalities  ::cvEEG showing epileptiform activity over left parietal lobe  ::Persistently febrile on admission, now afebrile since 10/2  ::LP done, lab work demonstrating WBC count of 27, protein 47, glucose 116, lymphocytes 45%  ::Meningitis PCR panel negative including HSV negative  -West Nile IgM and IgG ordered  -cvEEG discontinued  -c/w Keppra 750mg BID PO  -Stopped acyclovir/vancomycin per ID recs  -Tylenol PRN for fever     #Acute agitation/delirium  ::Possibly iatrogenic in setting of alternating propofol/precedex drips and midazolam/phenobarbital pushes in MICU  ::Reorientable by family  ::Patient pleasant and calm on exam for past 2 days  -Encourage frequent family reorientation  -Psychiatry following in MPU, appreciate recs              -c/wPRN olanzapine 2.5mg for agitation          #C/f alcohol use disorder  -D/c CIWA  -Favor acute viral encephalitis as cause of AMS rather than withdrawal  -s/p IV Thiamine 500mg TID  -folic acid 1mg every day  -thiamine 100mg every day     #Hypertension  ::Initial blood pressures as high as 239/127  ::S/p nicardipine drip  -Has been persistently hypertensive in 180s/190s  -Restarted home lisinopril 40mg  -Restarted home coreg 3.125 BID, will increase as necessary  -Hydral 25mg PO PRN for SBP>180       #Acute hypoxic respiratory failure  #Aspiration pneumonia vs pneumonitis  ::S/p extubation on early 10/3 now on 2L NC  ::Right basilar infiltrates seen on CT Chest  consistent with aspiration  ::Legionella, Strep Ag tests negative  ::Procal of 0.41  ::MRSA nares+  ::Blood cultures NGTD  -CTX and doxy dc'ed per ID recs, low concern for PNA\     #Nutrition  -Passed bedside swallow  -Diet advanced to full diabetic diet today     #YUMI, resolved  ::Reported, although no known baseline  ::Likely prerenal in setting of infection, seizures. Resolved with IVF  ::Urine lytes showing FENa of <.01%  -Daily RFP, avoid nephrotoxins, strict I&O     #Hyperglycemia  -HbA1c 13.4%, hx of DM2 on metformin at home  -Low concern for DKA/HHS, sp insulin ggt  -Increased lantus to 15u at bedtime, on insulin 5u AC and SSI, continue to titrate     #C/f rhabdomyolysis, resolved  ::CK likely elevated in setting of seizure  ::CK uptrended to 1019, now 632 s/p 1L  -No ongoing seizures, stop trending CK       F: PRN  E: Replete K>4.0, Phos>3.0, Mg>2.0   N: Diabetic Diet  A: pIV    DVT ppx: Switched from heparin to Lovenox  GI ppx: Not indicated    CODE STATUS: Full Code   Surrogate Decision Maker: Christo Braun ([phone])     Case seen and discussed with attending Dr. Márquez, to addend as necessary.    Ran Reis MD PGY-1

## 2023-10-05 NOTE — PROGRESS NOTES
"Valentina Fontanez is a 58 y.o. female on day 4 of admission presenting with Altered mental status.    Subjective   Patient seen. Denies new issues at this time, cont to endorse feeling somewhat down. Patient denies SI/HI at this time. She states that she has family coming to visit. Brother seen and interviewed briefly. States that at baseline, patient is fully alert/oriented, and that she is still far from baseline. Patient agreeable to cont plan at this time.        Objective     MENTAL STATUS EXAM  Appearance: Appears stated age, in hospital gown.   Attitude: cooperative but difficult to maintain concentration  Behavior: some eye contact  Motor Activity: Patient occasionally fidgets with IV lines and needs to be redirected by sitter  Speech: mumbled, latency with slowed rate which normalizes as speech progresses, soft volume/tone  Mood: \"good\"  Affect: intermittent mild lability, patient laughs when asked questions occasionally inappropriate to situation.  Thought Process: mildly disorganized. Occasionally made illogical statements.   Thought Content:  no suicidal ideation, no delusions elicited  Thought Perception: denies visual or auditory hallucinations, did not appear to be responding to hallucinatory stimuli  Cognition: alert and grossly oriented x 1-2, limited attention/concentration  Insight: Limited  Judgement: Limited    Last Recorded Vitals  Blood pressure (!) 161/106, pulse 90, temperature 36.1 °C (97 °F), resp. rate 20, height 1.676 m (5' 5.98\"), weight 68.6 kg (151 lb 3.8 oz), SpO2 100 %.  Intake/Output last 3 Shifts:  I/O last 3 completed shifts:  In: 350 (5.1 mL/kg) [IV Piggyback:350]  Out: 3712 (54.1 mL/kg) [Urine:3710 (1.5 mL/kg/hr); Other:2]  Weight: 68.6 kg     Relevant Results  Scheduled medications  carvedilol, 3.125 mg, oral, BID  enoxaparin, 40 mg, subcutaneous, Daily  folic acid, 1 mg, oral, Daily  insulin glargine, 15 Units, subcutaneous, Nightly  insulin lispro, 0-15 Units, subcutaneous, TID " with meals  insulin lispro, 5 Units, subcutaneous, TID with meals  levETIRAcetam, 750 mg, oral, BID  lisinopril, 40 mg, oral, Daily  magnesium sulfate, 2 g, intravenous, Once  multivitamin with minerals, 1 tablet, oral, Daily  potassium, sodium phosphates, 2 packet, oral, Once  thiamine, 100 mg, oral, Daily      Continuous medications     PRN medications  PRN medications: acetaminophen, dextrose 10 % in water (D10W), dextrose, glucagon, hydrALAZINE, OLANZapine     Results for orders placed or performed during the hospital encounter of 10/01/23 (from the past 24 hour(s))   Renal function panel   Result Value Ref Range    Glucose 267 (H) 74 - 99 mg/dL    Sodium 140 136 - 145 mmol/L    Potassium 4.0 3.5 - 5.3 mmol/L    Chloride 102 98 - 107 mmol/L    Bicarbonate 16 (L) 21 - 32 mmol/L    Anion Gap 26 (H) 10 - 20 mmol/L    Urea Nitrogen 10 6 - 23 mg/dL    Creatinine 0.63 0.50 - 1.05 mg/dL    eGFR >90 >60 mL/min/1.73m*2    Calcium 9.7 8.6 - 10.6 mg/dL    Phosphorus 2.6 2.5 - 4.9 mg/dL    Albumin 3.5 3.4 - 5.0 g/dL   Magnesium   Result Value Ref Range    Magnesium 2.22 1.60 - 2.40 mg/dL   POCT GLUCOSE   Result Value Ref Range    POCT Glucose 271 (H) 74 - 99 mg/dL   Renal function panel   Result Value Ref Range    Glucose 214 (H) 74 - 99 mg/dL    Sodium 139 136 - 145 mmol/L    Potassium 3.1 (L) 3.5 - 5.3 mmol/L    Chloride 100 98 - 107 mmol/L    Bicarbonate 25 21 - 32 mmol/L    Anion Gap 17 10 - 20 mmol/L    Urea Nitrogen 8 6 - 23 mg/dL    Creatinine 0.53 0.50 - 1.05 mg/dL    eGFR >90 >60 mL/min/1.73m*2    Calcium 9.2 8.6 - 10.6 mg/dL    Phosphorus 2.1 (L) 2.5 - 4.9 mg/dL    Albumin 3.2 (L) 3.4 - 5.0 g/dL   Magnesium   Result Value Ref Range    Magnesium 1.51 (L) 1.60 - 2.40 mg/dL   CBC   Result Value Ref Range    WBC 11.0 4.4 - 11.3 x10*3/uL    nRBC 0.0 0.0 - 0.0 /100 WBCs    RBC 4.41 4.00 - 5.20 x10*6/uL    Hemoglobin 14.0 12.0 - 16.0 g/dL    Hematocrit 46.9 (H) 36.0 - 46.0 %     (H) 80 - 100 fL    MCH 31.7 26.0 -  34.0 pg    MCHC 29.9 (L) 32.0 - 36.0 g/dL    RDW 12.3 11.5 - 14.5 %    Platelets 262 150 - 450 x10*3/uL    MPV 11.4 7.5 - 11.5 fL   POCT GLUCOSE   Result Value Ref Range    POCT Glucose 116 (H) 74 - 99 mg/dL   POCT GLUCOSE   Result Value Ref Range    POCT Glucose 234 (H) 74 - 99 mg/dL         Assessment/Plan   Principal Problem:    Altered mental status  Active Problems:    Fever    Abnormal CSF cytology    Aspiration pneumonia of both lower lobes (CMS/HCC)    Impression:  Delirium    Recommendations:  Safety/Monitoring:  Patient does not meet criteria for inpatient psychiatric admission  Patient does not require 1:1 observation, given enhanced safety features  Daily interdisciplinary safety and planning huddle with Psychiatry  Video monitoring as with all patients on the MPU  Psychiatry will follow patient daily while on the MPU    Medications:  If QTc < 500ms, recommend olanzapine 2.5mg PO/IM Q8H PRN acute agitation    Considerations:  Delirium guidelines while in the hospital, especially reorientation and regulation of circadian rhythm    Disposition/Discharge Planning:  SW following, appreciate assistance      Bradley Drew MD

## 2023-10-05 NOTE — CARE PLAN
The patient's goals for the shift include  remain free from pain, make it to the bathroom on time, and use the call bell when the RN is needed.    The clinical goals for the shift include Pt will no fall by the end of this shift

## 2023-10-05 NOTE — PROGRESS NOTES
"Recreation Therapy Note    Therapy Session  Referral Type: New referral this admission  Visit Type: New visit  Session Start Time: 1600  Session End Time: 1640  Intervention Delivery: In-person  Conflict of Service: None  Number of family members present: 0  Family Present for Session: None  Family Participation: None  Number of staff members present: 1    Pre-assessment  Unable to Assess Reason: Outcomes not assessed  Mood/Affect: Calm, Confused  Verbalized Emotional State:  (none)    Treatment  Areas of Focus: Coping, Isolation reduction, Cognitive functioning improvement, Self-expression, Socialization  Co-Treatment:  (none)  Interruption: No  Patient Fell Asleep at End of Session: No    Post-assessment  Unable to Assess Reason: Outcomes not applicable  Mood/Affect: Appropriate, Confused, Cooperative, Participative  Verbalized Emotional State:  (none verbalized)  Total Session Time (min): 40 minutes    Narrative  Assessment Detail: Patient was pleasant and welcoming.  Stated that she hadn't had her medication for the past six months and blacked out.  Does not remember doing that.  Offered her a variety of  positive leisure coping skills possibilities.  Patient chose to create a [employer] project.  At one point became confused and stared off for a few minutes.  When asked if she was ok, patient stated \"I looked over at the shoes to the attic but it's not the same.\"  However after her moment of confusion patient was then able to complete her project successfully.  Patient grew tired and asked if she could finish her project tomorrow. Completed approximately 75% of the project.  Will continue to encourage positive leisure coping skills exploration.  Plan: Will encourage the exploration of safe and effective positve leisure coping skills to manage situational stressors.    Education Documentation  No documentation found.          Expressive Therapies Note  "

## 2023-10-05 NOTE — CONSULTS
PM&R Consult Note  Patient: Valentina Fontanez   Age/sex: 58 y.o.   Medical Record #: [id number]       Referring physician: Dr. Carroll  Consulting physician: Dr. Lott       Procedures performed on/related to this admission:  none    Chief complaint:   Impairments and acitivities limitations in ADLs, mobility, functional communication, and cognition secondary to viral encephalopathy    HPI:   Valentina Fontanez is a 58 y.o. year old female patient with T2DM presented to the ED after being found down by mother the morning of admission. Pt reportedly seized en route to hospital, s/p IM Versed by EMS. GCS 4 in ED, s/p intubation, admitted to MICU. Initially treated with insulin gtt empirically for hyperglycemia, but presentation not felt to be DKA.  LP done c/w viral encephalitis, but PCR panel negative (including HSV). Previously on vanc/ceftriaxone/doxy/acyclovir, per ID can narrow treatment to ceftriaxone/doxy for CAP/aspiration pneumonia. 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 ID stopped abx. Patient remains confused.      PO: carb controlled  Pain: none  Bowel: 10/5  Bladder: volitional  DVT prophylaxis with lovenox.   Weight bearing status: WBAT    Precautions: none    History reviewed. No pertinent past medical history.     History reviewed. No pertinent surgical history.     No family history on file.     No Known Allergies     carvedilol, 3.125 mg, oral, BID  enoxaparin, 40 mg, subcutaneous, Daily  folic acid, 1 mg, oral, Daily  insulin glargine, 15 Units, subcutaneous, Nightly  insulin lispro, 0-15 Units, subcutaneous, TID with meals  insulin lispro, 5 Units, subcutaneous, TID with meals  levETIRAcetam, 750 mg, oral, BID  lisinopril, 40 mg, oral, Daily  magnesium sulfate, 2 g, intravenous, Once  multivitamin with minerals, 1 tablet, oral, Daily  potassium, sodium phosphates, 2 packet, oral, Once  thiamine, 100 mg, oral, Daily         PRN medications: acetaminophen,  dextrose 10 % in water (D10W), dextrose, glucagon, hydrALAZINE, OLANZapine     Social History:  Tobacco/EtOh/Illicits:n/a  Working History:prior  ; retired  Social supports: lives w/ elderly mother  Home situation: Lives in home, did not communicate steps in home    Functional History:  Home DME: none  Premorbid ADL's: independent   Premorbid Mobility: independent   Present: mobilty and ADLs limited in setting of impaired cognition    Physical Therapy Progress Note 10/4 Min A for ambulation w/ WW x8ft with max queing. Min A for transfers w/ Mod queing.     Occupational Therapy Evaluation attempted but could not complete 10/3 due to sedation in setting of agitation.    Review of Systems   Constitutional:  Negative for chills, fatigue and fever.   HENT:  Negative for congestion, sinus pressure and trouble swallowing.    Respiratory:  Negative for cough, shortness of breath and wheezing.    Cardiovascular:  Negative for chest pain, palpitations and leg swelling.   Gastrointestinal:  Negative for constipation, diarrhea, nausea and vomiting.   Genitourinary:  Negative for decreased urine volume, difficulty urinating and dysuria.   Musculoskeletal:  Negative for arthralgias, joint swelling and neck stiffness.   Skin:  Negative for color change and pallor.   Neurological:  Negative for weakness, numbness and headaches.   Psychiatric/Behavioral:  Positive for confusion and decreased concentration. Negative for agitation and hallucinations.         Physical Exam  Constitutional:       Appearance: Normal appearance. She is normal weight.   HENT:      Head: Normocephalic and atraumatic.      Mouth/Throat:      Mouth: Mucous membranes are moist.      Pharynx: Oropharynx is clear.   Eyes:      Extraocular Movements: Extraocular movements intact.      Pupils: Pupils are equal, round, and reactive to light.   Cardiovascular:      Rate and Rhythm: Normal rate and regular rhythm.      Pulses: Normal pulses.      Heart  sounds: Normal heart sounds.   Pulmonary:      Effort: Pulmonary effort is normal.      Breath sounds: Normal breath sounds.   Abdominal:      General: Abdomen is flat. Bowel sounds are normal.      Palpations: Abdomen is soft.   Musculoskeletal:         General: Normal range of motion.      Cervical back: Normal range of motion and neck supple.   Skin:     General: Skin is warm and dry.   Neurological:      General: No focal deficit present.      Mental Status: She is disoriented.      Sensory: Sensation is intact.      Motor: No weakness.      Coordination: Coordination abnormal.      Comments: 5/5 in bilateral upper and lower extremities globally  Limited by patient impaired cognition  AxO x2   Psychiatric:         Mood and Affect: Affect is flat.         Cognition and Memory: Memory is impaired. She exhibits impaired recent memory.          [unfilled]    IMPRESSION:  Valentina Fontanez is a 58 y.o. year old female patient with T2DM presented to the ED after being found down by mother the morning of admission. Pt reportedly seized en route to hospital, s/p IM Versed by EMS. GCS 4 in ED, s/p intubation, admitted to MICU. Initially treated with insulin gtt empirically for hyperglycemia, but presentation not felt to be DKA.  LP done c/w viral encephalitis, but PCR panel negative (including HSV). Previously on vanc/ceftriaxone/doxy/acyclovir, per ID can narrow treatment to ceftriaxone/doxy for CAP/aspiration pneumonia. 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 ID stopped abx. Patient remains confused.      PM&R was consulted for recommendations and for IRF appropriateness.    Recommendations:  # Viral Encephalitis  # Seizure  # Acute Delirium  # Memory Loss  - PT/OT/ST  - Keppra 750mg BID PO  - abx and antivirals stopped per ID  - Delirium/agitatio possible secondary in setting of ICU delirium and sedatives    #C/f rhabdomyolysis, resolved  - CK likely elevated in setting of  seizure    # Neurogenic bowel/bladder  - Voiding volitionally   - Last BM 10/5  - Recommend daily bowel program of Miralax BID and Docusate 100mg BID  - Goal of one BM daily, at risk for constipation while on opioids for pain management    # Immobility   - Prevent secondary complications   - Skin protection: low air loss mattress, turn q 2 hours in bed, maintain bowel and bladder continence/containment  - Prevention of pulmonary complications: incentive spirometry and pulmonary toileting  - Maintain adequate nutrition and hydration  - Q shift PROM of upper and lower extremities to prevent contracture    # Impaired mobility and Impaired independence with ADLs and I/ADLs  - Continue PT, working on bed mobility, transfers, balance, endurance, strength, gait, eval for appropriate assistive device  - Continue OT, working on functional cognition, functional mobility, upper limb strength/coordination, balance, endurance, eval for appropriate adaptive equipment, ADLs, and I/ADLs.    # Dispo  - Patient would benefit from an acute inpatient rehab stay to improve functional outcome of impaired mobility, ADL's, transfers, and self-care. Patient would be an excellent candidate for acute rehabilitation once medically stable and is able and motivated to participate in 3 hours/day of therapy.   - Patient would benefit from medical follow up at least three times a week to address and monitor pain, BP, complications and other comorbidities. Patient was independent in mobility and ADLs at baseline prior to this incident. Acute rehab is appropriate to assist in returning to PLOF and living situation.  - Post rehab destination: anticipated home   - Must be off IV pain meds prior to transfer  - Must be off restraints for 24hrs prior to IRF transfer  - For questions, please contact via [employer]    Estimated length of stay is 7 days with discharge home at modified independent level.     We will follow along with you.  Thank you for the  consult.    Shelton Ennis, DO  Physical Medicine and Rehabilitation PGY-3  Available via [employer]     Patient seen and examined with attending Dr. Bruce Lott, who agrees with assessment and plan.     NOTE: This note is not finalized until attending reviews and signs.

## 2023-10-06 PROBLEM — R56.9: Status: ACTIVE | Noted: 2023-10-06

## 2023-10-06 PROBLEM — R53.81 PHYSICAL DECONDITIONING: Status: ACTIVE | Noted: 2023-10-06

## 2023-10-06 PROBLEM — I10 HTN (HYPERTENSION): Status: ACTIVE | Noted: 2023-10-06

## 2023-10-06 LAB
ALBUMIN SERPL BCP-MCNC: 3.1 G/DL (ref 3.4–5)
ANION GAP SERPL CALC-SCNC: 17 MMOL/L (ref 10–20)
BACTERIA BLD CULT: NORMAL
BUN SERPL-MCNC: 18 MG/DL (ref 6–23)
CALCIUM SERPL-MCNC: 9.1 MG/DL (ref 8.6–10.6)
CHLORIDE SERPL-SCNC: 99 MMOL/L (ref 98–107)
CHLORIDE UR-SCNC: 24 MMOL/L
CHLORIDE/CREATININE (MMOL/G) IN URINE: 10 MMOL/G CREAT (ref 38–318)
CO2 SERPL-SCNC: 25 MMOL/L (ref 21–32)
CREAT SERPL-MCNC: 0.62 MG/DL (ref 0.5–1.05)
CREAT UR-MCNC: 232.1 MG/DL (ref 20–320)
ERYTHROCYTE [DISTWIDTH] IN BLOOD BY AUTOMATED COUNT: 11.2 % (ref 11.5–14.5)
GFR SERPL CREATININE-BSD FRML MDRD: >90 ML/MIN/1.73M*2
GLUCOSE BLD MANUAL STRIP-MCNC: 155 MG/DL (ref 74–99)
GLUCOSE BLD MANUAL STRIP-MCNC: 159 MG/DL (ref 74–99)
GLUCOSE BLD MANUAL STRIP-MCNC: 259 MG/DL (ref 74–99)
GLUCOSE SERPL-MCNC: 232 MG/DL (ref 74–99)
HCT VFR BLD AUTO: 39.9 % (ref 36–46)
HGB BLD-MCNC: 13.2 G/DL (ref 12–16)
MAGNESIUM SERPL-MCNC: 2.01 MG/DL (ref 1.6–2.4)
MCH RBC QN AUTO: 32 PG (ref 26–34)
MCHC RBC AUTO-ENTMCNC: 33.1 G/DL (ref 32–36)
MCV RBC AUTO: 97 FL (ref 80–100)
NRBC BLD-RTO: 0 /100 WBCS (ref 0–0)
PHOSPHATE SERPL-MCNC: 3.4 MG/DL (ref 2.5–4.9)
PLATELET # BLD AUTO: 304 X10*3/UL (ref 150–450)
PMV BLD AUTO: 11.4 FL (ref 7.5–11.5)
POTASSIUM SERPL-SCNC: 3.7 MMOL/L (ref 3.5–5.3)
POTASSIUM UR-SCNC: 44 MMOL/L
POTASSIUM/CREAT UR-RTO: 19 MMOL/G CREAT
RBC # BLD AUTO: 4.13 X10*6/UL (ref 4–5.2)
SODIUM SERPL-SCNC: 137 MMOL/L (ref 136–145)
SODIUM UR-SCNC: 25 MMOL/L
SODIUM/CREAT UR-RTO: 11 MMOL/G CREAT
WBC # BLD AUTO: 12.1 X10*3/UL (ref 4.4–11.3)

## 2023-10-06 PROCEDURE — 2500000001 HC RX 250 WO HCPCS SELF ADMINISTERED DRUGS (ALT 637 FOR MEDICARE OP): Performed by: STUDENT IN AN ORGANIZED HEALTH CARE EDUCATION/TRAINING PROGRAM

## 2023-10-06 PROCEDURE — 2500000001 HC RX 250 WO HCPCS SELF ADMINISTERED DRUGS (ALT 637 FOR MEDICARE OP)

## 2023-10-06 PROCEDURE — 97165 OT EVAL LOW COMPLEX 30 MIN: CPT | Mod: GO

## 2023-10-06 PROCEDURE — 97129 THER IVNTJ 1ST 15 MIN: CPT | Mod: GO

## 2023-10-06 PROCEDURE — 1100000001 HC PRIVATE ROOM DAILY

## 2023-10-06 PROCEDURE — 80069 RENAL FUNCTION PANEL: CPT

## 2023-10-06 PROCEDURE — 36415 COLL VENOUS BLD VENIPUNCTURE: CPT

## 2023-10-06 PROCEDURE — 97530 THERAPEUTIC ACTIVITIES: CPT | Mod: GP

## 2023-10-06 PROCEDURE — 2500000002 HC RX 250 W HCPCS SELF ADMINISTERED DRUGS (ALT 637 FOR MEDICARE OP, ALT 636 FOR OP/ED)

## 2023-10-06 PROCEDURE — 2500000004 HC RX 250 GENERAL PHARMACY W/ HCPCS (ALT 636 FOR OP/ED)

## 2023-10-06 PROCEDURE — 99233 SBSQ HOSP IP/OBS HIGH 50: CPT | Performed by: PSYCHIATRY & NEUROLOGY

## 2023-10-06 PROCEDURE — 84300 ASSAY OF URINE SODIUM: CPT

## 2023-10-06 PROCEDURE — 82947 ASSAY GLUCOSE BLOOD QUANT: CPT

## 2023-10-06 PROCEDURE — 96372 THER/PROPH/DIAG INJ SC/IM: CPT

## 2023-10-06 PROCEDURE — 99232 SBSQ HOSP IP/OBS MODERATE 35: CPT

## 2023-10-06 PROCEDURE — 85027 COMPLETE CBC AUTOMATED: CPT

## 2023-10-06 PROCEDURE — 84295 ASSAY OF SERUM SODIUM: CPT

## 2023-10-06 PROCEDURE — 83735 ASSAY OF MAGNESIUM: CPT

## 2023-10-06 RX ORDER — CARVEDILOL 12.5 MG/1
12.5 TABLET ORAL 2 TIMES DAILY
Status: DISCONTINUED | OUTPATIENT
Start: 2023-10-06 | End: 2023-10-07 | Stop reason: HOSPADM

## 2023-10-06 RX ORDER — INSULIN GLARGINE 100 [IU]/ML
20 INJECTION, SOLUTION SUBCUTANEOUS NIGHTLY
Qty: 10 ML | Refills: 2 | Status: SHIPPED | OUTPATIENT
Start: 2023-10-06 | End: 2023-10-07 | Stop reason: SDUPTHER

## 2023-10-06 RX ORDER — FOLIC ACID 1 MG/1
1 TABLET ORAL DAILY
Qty: 60 TABLET | Refills: 2 | Status: SHIPPED | OUTPATIENT
Start: 2023-10-07

## 2023-10-06 RX ORDER — LEVETIRACETAM 750 MG/1
750 TABLET ORAL 2 TIMES DAILY
Qty: 90 TABLET | Refills: 1 | Status: SHIPPED | OUTPATIENT
Start: 2023-10-06 | End: 2024-03-26 | Stop reason: WASHOUT

## 2023-10-06 RX ORDER — MULTIVIT-MIN/IRON FUM/FOLIC AC 7.5 MG-4
1 TABLET ORAL DAILY
Qty: 60 TABLET | Refills: 2 | Status: SHIPPED | OUTPATIENT
Start: 2023-10-06

## 2023-10-06 RX ORDER — LISINOPRIL 40 MG/1
40 TABLET ORAL DAILY
Qty: 60 TABLET | Refills: 1 | Status: SHIPPED | OUTPATIENT
Start: 2023-10-07 | End: 2024-03-26 | Stop reason: SDUPTHER

## 2023-10-06 RX ORDER — INSULIN GLARGINE 100 [IU]/ML
20 INJECTION, SOLUTION SUBCUTANEOUS NIGHTLY
Status: DISCONTINUED | OUTPATIENT
Start: 2023-10-06 | End: 2023-10-07 | Stop reason: HOSPADM

## 2023-10-06 RX ORDER — OLANZAPINE 2.5 MG/1
2.5 TABLET ORAL 3 TIMES DAILY PRN
Qty: 15 TABLET | Refills: 1 | Status: SHIPPED | OUTPATIENT
Start: 2023-10-06 | End: 2023-10-06 | Stop reason: SDUPTHER

## 2023-10-06 RX ORDER — CARVEDILOL 6.25 MG/1
6.25 TABLET ORAL 2 TIMES DAILY
Status: DISCONTINUED | OUTPATIENT
Start: 2023-10-06 | End: 2023-10-06

## 2023-10-06 RX ORDER — INSULIN LISPRO 100 [IU]/ML
8 INJECTION, SOLUTION INTRAVENOUS; SUBCUTANEOUS
Qty: 10 ML | Refills: 2 | Status: SHIPPED | OUTPATIENT
Start: 2023-10-07 | End: 2023-10-07 | Stop reason: SDUPTHER

## 2023-10-06 RX ORDER — INSULIN LISPRO 100 [IU]/ML
8 INJECTION, SOLUTION INTRAVENOUS; SUBCUTANEOUS
Status: DISCONTINUED | OUTPATIENT
Start: 2023-10-06 | End: 2023-10-07 | Stop reason: HOSPADM

## 2023-10-06 RX ORDER — OLANZAPINE 2.5 MG/1
12.5 TABLET ORAL NIGHTLY PRN
Qty: 15 TABLET | Refills: 1 | Status: SHIPPED | OUTPATIENT
Start: 2023-10-06 | End: 2023-10-07 | Stop reason: HOSPADM

## 2023-10-06 RX ADMIN — INSULIN GLARGINE 20 UNITS: 100 INJECTION, SOLUTION SUBCUTANEOUS at 20:13

## 2023-10-06 RX ADMIN — LEVETIRACETAM 750 MG: 750 TABLET, FILM COATED ORAL at 20:12

## 2023-10-06 RX ADMIN — LEVETIRACETAM 750 MG: 750 TABLET, FILM COATED ORAL at 09:24

## 2023-10-06 RX ADMIN — INSULIN LISPRO 8 UNITS: 100 INJECTION, SOLUTION INTRAVENOUS; SUBCUTANEOUS at 12:30

## 2023-10-06 RX ADMIN — CARVEDILOL 3.12 MG: 3.12 TABLET, FILM COATED ORAL at 11:14

## 2023-10-06 RX ADMIN — Medication 1 TABLET: at 08:59

## 2023-10-06 RX ADMIN — FOLIC ACID 1 MG: 1 TABLET ORAL at 08:59

## 2023-10-06 RX ADMIN — INSULIN LISPRO 3 UNITS: 100 INJECTION, SOLUTION INTRAVENOUS; SUBCUTANEOUS at 18:24

## 2023-10-06 RX ADMIN — LISINOPRIL 40 MG: 40 TABLET ORAL at 08:59

## 2023-10-06 RX ADMIN — THIAMINE HCL TAB 100 MG 100 MG: 100 TAB at 08:59

## 2023-10-06 RX ADMIN — INSULIN LISPRO 8 UNITS: 100 INJECTION, SOLUTION INTRAVENOUS; SUBCUTANEOUS at 18:23

## 2023-10-06 RX ADMIN — ENOXAPARIN SODIUM 40 MG: 40 INJECTION SUBCUTANEOUS at 08:59

## 2023-10-06 RX ADMIN — INSULIN LISPRO 9 UNITS: 100 INJECTION, SOLUTION INTRAVENOUS; SUBCUTANEOUS at 09:02

## 2023-10-06 RX ADMIN — CARVEDILOL 12.5 MG: 25 TABLET, FILM COATED ORAL at 20:12

## 2023-10-06 RX ADMIN — INSULIN LISPRO 5 UNITS: 100 INJECTION, SOLUTION INTRAVENOUS; SUBCUTANEOUS at 09:01

## 2023-10-06 RX ADMIN — HYDRALAZINE HYDROCHLORIDE 25 MG: 25 TABLET ORAL at 18:22

## 2023-10-06 RX ADMIN — INSULIN LISPRO 3 UNITS: 100 INJECTION, SOLUTION INTRAVENOUS; SUBCUTANEOUS at 12:28

## 2023-10-06 SDOH — HEALTH STABILITY: MENTAL HEALTH: HOW MANY DRINKS CONTAINING ALCOHOL DO YOU HAVE ON A TYPICAL DAY WHEN YOU ARE DRINKING?: PATIENT DOES NOT DRINK

## 2023-10-06 SDOH — ECONOMIC STABILITY: FOOD INSECURITY: WITHIN THE PAST 12 MONTHS, YOU WORRIED THAT YOUR FOOD WOULD RUN OUT BEFORE YOU GOT MONEY TO BUY MORE.: NEVER TRUE

## 2023-10-06 SDOH — HEALTH STABILITY: MENTAL HEALTH
DO YOU FEEL STRESS - TENSE, RESTLESS, NERVOUS, OR ANXIOUS, OR UNABLE TO SLEEP AT NIGHT BECAUSE YOUR MIND IS TROUBLED ALL THE TIME - THESE DAYS?: NOT AT ALL

## 2023-10-06 SDOH — ECONOMIC STABILITY: FOOD INSECURITY: HOW HARD IS IT FOR YOU TO PAY FOR THE VERY BASICS LIKE FOOD, HOUSING, MEDICAL CARE, AND HEATING?: NOT VERY HARD

## 2023-10-06 SDOH — HEALTH STABILITY: MENTAL HEALTH: HOW OFTEN DO YOU HAVE SIX OR MORE DRINKS ON ONE OCCASION?: NEVER

## 2023-10-06 SDOH — SOCIAL STABILITY: SOCIAL INSECURITY: WITHIN THE LAST YEAR, HAVE YOU BEEN HUMILIATED OR EMOTIONALLY ABUSED IN OTHER WAYS BY YOUR PARTNER OR EX-PARTNER?: NO

## 2023-10-06 SDOH — SOCIAL STABILITY: SOCIAL INSECURITY: WITHIN THE LAST YEAR, HAVE YOU BEEN AFRAID OF YOUR PARTNER OR EX-PARTNER?: NO

## 2023-10-06 SDOH — HEALTH STABILITY: MENTAL HEALTH: HOW OFTEN DO YOU HAVE A DRINK CONTAINING ALCOHOL?: NEVER

## 2023-10-06 SDOH — ECONOMIC STABILITY: INCOME INSECURITY: IN THE PAST 12 MONTHS, HAS THE ELECTRIC, GAS, OIL, OR WATER COMPANY THREATENED TO SHUT OFF SERVICE IN YOUR HOME?: NO

## 2023-10-06 SDOH — HEALTH STABILITY: PHYSICAL HEALTH: ON AVERAGE, HOW MANY DAYS PER WEEK DO YOU ENGAGE IN MODERATE TO STRENUOUS EXERCISE (LIKE A BRISK WALK)?: 5 DAYS

## 2023-10-06 SDOH — ECONOMIC STABILITY: FOOD INSECURITY: WITHIN THE PAST 12 MONTHS, YOU WORRIED THAT YOUR FOOD WOULD RUN OUT BEFORE YOU GOT THE MONEY TO BUY MORE.: NEVER TRUE

## 2023-10-06 SDOH — ECONOMIC STABILITY: INCOME INSECURITY: HOW HARD IS IT FOR YOU TO PAY FOR THE VERY BASICS LIKE FOOD, HOUSING, MEDICAL CARE, AND HEATING?: NOT VERY HARD

## 2023-10-06 SDOH — ECONOMIC STABILITY: HOUSING INSECURITY: IN THE LAST 12 MONTHS, WAS THERE A TIME WHEN YOU WERE NOT ABLE TO PAY THE MORTGAGE OR RENT ON TIME?: NO

## 2023-10-06 SDOH — ECONOMIC STABILITY: TRANSPORTATION INSECURITY: IN THE PAST 12 MONTHS, HAS LACK OF TRANSPORTATION KEPT YOU FROM MEDICAL APPOINTMENTS OR FROM GETTING MEDICATIONS?: NO

## 2023-10-06 SDOH — ECONOMIC STABILITY: INCOME INSECURITY: IN THE LAST 12 MONTHS, WAS THERE A TIME WHEN YOU WERE NOT ABLE TO PAY THE MORTGAGE OR RENT ON TIME?: NO

## 2023-10-06 SDOH — ECONOMIC STABILITY: FOOD INSECURITY: WITHIN THE PAST 12 MONTHS, THE FOOD YOU BOUGHT JUST DIDN'T LAST AND YOU DIDN'T HAVE MONEY TO GET MORE.: NEVER TRUE

## 2023-10-06 SDOH — HEALTH STABILITY: MENTAL HEALTH: HOW OFTEN DO YOU HAVE 6 OR MORE DRINKS ON ONE OCCASION?: NEVER

## 2023-10-06 SDOH — HEALTH STABILITY: MENTAL HEALTH
STRESS IS WHEN SOMEONE FEELS TENSE, NERVOUS, ANXIOUS, OR CAN'T SLEEP AT NIGHT BECAUSE THEIR MIND IS TROUBLED. HOW STRESSED ARE YOU?: NOT AT ALL

## 2023-10-06 SDOH — HEALTH STABILITY: MENTAL HEALTH: HOW MANY STANDARD DRINKS CONTAINING ALCOHOL DO YOU HAVE ON A TYPICAL DAY?: PATIENT DOES NOT DRINK

## 2023-10-06 SDOH — ECONOMIC STABILITY: INCOME INSECURITY: IN THE PAST 12 MONTHS HAS THE ELECTRIC, GAS, OIL, OR WATER COMPANY THREATENED TO SHUT OFF SERVICES IN YOUR HOME?: NO

## 2023-10-06 ASSESSMENT — COGNITIVE AND FUNCTIONAL STATUS - GENERAL
TOILETING: A LITTLE
MOBILITY SCORE: 24
HELP NEEDED FOR BATHING: A LITTLE
DAILY ACTIVITIY SCORE: 21
DRESSING REGULAR LOWER BODY CLOTHING: A LITTLE

## 2023-10-06 ASSESSMENT — COLUMBIA-SUICIDE SEVERITY RATING SCALE - C-SSRS
6. HAVE YOU EVER DONE ANYTHING, STARTED TO DO ANYTHING, OR PREPARED TO DO ANYTHING TO END YOUR LIFE?: NO
1. SINCE LAST CONTACT, HAVE YOU WISHED YOU WERE DEAD OR WISHED YOU COULD GO TO SLEEP AND NOT WAKE UP?: NO
1. SINCE LAST CONTACT, HAVE YOU WISHED YOU WERE DEAD OR WISHED YOU COULD GO TO SLEEP AND NOT WAKE UP?: NO
2. HAVE YOU ACTUALLY HAD ANY THOUGHTS OF KILLING YOURSELF?: NO
6. HAVE YOU EVER DONE ANYTHING, STARTED TO DO ANYTHING, OR PREPARED TO DO ANYTHING TO END YOUR LIFE?: NO
2. HAVE YOU ACTUALLY HAD ANY THOUGHTS OF KILLING YOURSELF?: NO

## 2023-10-06 ASSESSMENT — PAIN - FUNCTIONAL ASSESSMENT
PAIN_FUNCTIONAL_ASSESSMENT: 0-10

## 2023-10-06 ASSESSMENT — ENCOUNTER SYMPTOMS
WEAKNESS: 0
DIZZINESS: 0
ARTHRALGIAS: 0
JOINT SWELLING: 0
FATIGUE: 0
PALPITATIONS: 0
NUMBNESS: 0
NERVOUS/ANXIOUS: 0
COUGH: 0
BLOOD IN STOOL: 0
CONSTIPATION: 0
UNEXPECTED WEIGHT CHANGE: 0
FLANK PAIN: 0
HEADACHES: 0
ABDOMINAL PAIN: 0
CHILLS: 0
FEVER: 0
VOMITING: 0
SHORTNESS OF BREATH: 0
DYSURIA: 0
NAUSEA: 0
DIARRHEA: 0
CONFUSION: 0
BACK PAIN: 0
DYSPHORIC MOOD: 0
LIGHT-HEADEDNESS: 0

## 2023-10-06 ASSESSMENT — ACTIVITIES OF DAILY LIVING (ADL)
LACK_OF_TRANSPORTATION: NO
BATHING_ASSISTANCE: STAND BY

## 2023-10-06 ASSESSMENT — PAIN SCALES - GENERAL
PAINLEVEL_OUTOF10: 0 - NO PAIN

## 2023-10-06 ASSESSMENT — LIFESTYLE VARIABLES
AUDIT-C TOTAL SCORE: 0
SKIP TO QUESTIONS 9-10: 1

## 2023-10-06 NOTE — PROGRESS NOTES
Occupational Therapy    Evaluation/Treatment    Patient Name: Valentina Fontanez  MRN: [id number]  : 1965  Today's Date: 10/06/23  Time Calculation  Start Time: 1037  Stop Time: 1106  Time Calculation (min): 29 min       Assessment:  OT Assessment: Patient with improving cognition; scoring with questionable cognition on Short Blessed Test. Pt. demo good safety awareness with observed functional tasks and functional mobility.  Prognosis: Good  Barriers to Discharge: None  Evaluation/Treatment Tolerance: Patient tolerated treatment well  Medical Staff Made Aware: Yes  OT Assessment Results: Decreased cognition (cog improving)  Prognosis: Good  Barriers to Discharge: None  Evaluation/Treatment Tolerance: Patient tolerated treatment well  Medical Staff Made Aware: Yes  Strengths: Coping skills  Plan:  Treatment Interventions: ADL retraining, Functional transfer training, UE strengthening/ROM, Endurance training, Cognitive reorientation, Compensatory technique education  OT Frequency: 3 times per week  OT Discharge Recommendations: Low intensity level of continued care  OT Recommended Transfer Status: Stand by assist  OT - Requires Next F/U Appointment: 10/09/23  Treatment Interventions: ADL retraining, Functional transfer training, UE strengthening/ROM, Endurance training, Cognitive reorientation, Compensatory technique education    Subjective   Current Problem:  1. Diabetic ketoacidosis with coma associated with other specified diabetes mellitus (CMS/HCC)        2. Encephalopathy acute  Syphilis Screen with Reflex    Lumbar Puncture    Lumbar Puncture    EEG    EEG    Referral to Neurology        General:      General  Reason for Referral: 57 y/o F found down at home with seizure in route to hospital and requiring intubation. Extubated on 10/3. Continued workup of non-HSV viral encephalitis.  Past Medical History Relevant to Rehab: ETOH, DMII  Family/Caregiver Present: No  Caregiver Feedback: Educated RN and  communication with team regarding pt performance with session.  Patient Position Received: Bed, 3 rail up, Alarm off, not on at start of session  Preferred Learning Style: verbal, visual  General Comment: Pt. seated in bed at start of session and agreeable to OT eval.  Precautions:  Medical Precautions: Fall precautions, Seizure precautions  Vital Signs:     Pain:  Pain Assessment  Pain Assessment: 0-10  Pain Score: 0 - No pain    Objective   Cognition:  Overall Cognitive Status: Impaired  Orientation Level: Disoriented to time (Correct date; thought it was 4pm)  Attention: Exceptions to WFL (mild inattention)  Memory: Exceptions to WFL  Memory Comments: mild deficits noted with delayed recall with Short Blessed Test  Cognition Test Scores  Cognition Tests: Cognition Test Performed  SBT Test Score: 7/28 (Deficits noted with: time of day, delayed recall, months in reverse. Pt. reporting questions were medium to hard level of difficulty.)        Home Living:  Type of Home: House  Lives With: Parent(s)  Home Adaptive Equipment: None  Home Layout:  (3 level; pt room on 3rd floor)  Home Access: Stairs to enter with rails  Entrance Stairs-Rails: Both  Entrance Stairs-Number of Steps: 5  Bathroom Shower/Tub: Walk-in tub       ADL:  Eating Assistance: Modified independent (Device)  Eating Deficit: Setup  Grooming Assistance: Modified independent (Device)  Grooming Deficit: Setup  Bathing Assistance: Stand by  Bathing Deficit: Supervision/safety  UE Dressing Assistance: Modified independent (Device)  UE Dressing Deficit: Setup  LE Dressing Assistance: Stand by  LE Dressing Deficit: Verbal cueing, Supervision/safety  Toileting Assistance with Device: Stand by  Toileting Deficit: Verbal cueing, Supervison/safety  Functional Assistance: Stand by (Pt. demo from EOB<>bathroom with SBA with no AD.)  ADL Comments: Pt. reporting IND with ADLs, IADLs, driving. Pt. enjoys listening to music and watching TV.    Activity  Tolerance:  Endurance: Endurance does not limit participation in activity       Bed Mobility/Transfers: Bed Mobility  Bed Mobility: Yes  Bed Mobility 1  Bed Mobility 1: Supine to sitting, Sitting to supine  Level of Assistance 1: Distant supervision    Transfers  Transfer: Yes  Transfer 1  Transfer From 1: Sit to, Stand to, Bed to, Toilet to  Transfer to 1: Sit, Stand, Toilet  Technique 1: Sit to stand, Stand to sit (walking from bed to toilet)  Transfer Device 1:  (none)  Transfer Level of Assistance 1: Close supervision    Toilet Transfers  Toilet Transfer From: Bed  Toilet Transfer Type: To and from  Toilet Transfer to: Raised toilet seat with rails  Toilet Transfer Technique: Ambulating  Toilet Transfers: Supervision  Toilet Transfers Comments: min v/c prior to task. Good safety awareness noted.      Cognitive Skill Development:  Cognitive Skill Development Activity 1: Educated pt on importance of cognitive stimulation throughout day. Educated pt on use of television and meals to help indicate time of day. Educated pt on results of cognitive testing.    Strength:  Strength Comments: WFL       Extremities: RUE   RUE : Within Functional Limits and LUE   LUE: Within Functional Limits      Outcome Measures: [facility] Daily Activity  Putting on and taking off regular lower body clothing: A little  Bathing (including washing, rinsing, drying): A little  Putting on and taking off regular upper body clothing: None  Toileting, which includes using toilet, bedpan or urinal: A little  Taking care of personal grooming such as brushing teeth: None  Eating Meals: None  Daily Activity - Total Score: 21        Education Documentation  ADL Training, taught by Joya Pritchard OT at 10/6/2023 11:52 AM.  Learner: Patient  Readiness: Acceptance  Method: Explanation  Response: Verbalizes Understanding  Comment: Educated pt on cognitive strategies to increase functional independence and ability to care for herself.          Goals:  Encounter Problems       Encounter Problems (Active)       ADLs       Pt. Will complete LB dressing with MOD I.   (Progressing)       Start:  10/06/23    Expected End:  10/20/23            Pt. will complete toileting with MOD I.  (Progressing)       Start:  10/06/23    Expected End:  10/20/23            Pt. will complete bathing with MOD I.  (Progressing)       Start:  10/06/23    Expected End:  10/20/23               COGNITION/SAFETY       Pt. will score within normal cognition on Short Blessed Test.  (Progressing)       Start:  10/06/23    Expected End:  10/20/23               MOBILITY       Pt. Will demo household distance functional mobility with MOD I using LRAD.   (Progressing)       Start:  10/06/23    Expected End:  10/20/23                             TRANSFERS       Pt. Will complete stand pivot transfer with MOD I using LRAD.   (Progressing)       Start:  10/06/23    Expected End:  10/20/23

## 2023-10-06 NOTE — PROGRESS NOTES
"Valentina Fontanez is a 58 y.o. female on day 5 of admission presenting with Altered mental status.    Subjective   Patient seen. Denies new issues at this time, endorses feeling improved. Denies SI/HI/AVH. Mildly tearful when mentioning family members. States that she slept ok.       Objective     MENTAL STATUS EXAM  Appearance: Appears stated age, in hospital gown.   Attitude: cooperative but difficult to maintain concentration  Behavior: some eye contact  Motor Activity: wnl, less fidgety, no overt PMR/PMA  Speech: mumbled, latency with slowed rate which normalizes as speech progresses, soft volume/tone  Mood: \"good\"  Affect: intermittent mild lability, appropriate laughing, inappropriate tearfulness intermittently  Thought Process: mostly organized. Occasionally makes illogical statements.   Thought Content:  no suicidal ideation, no delusions elicited  Thought Perception: denies visual or auditory hallucinations, did not appear to be responding to hallucinatory stimuli  Cognition: alert and grossly oriented x 1-2, limited attention/concentration  Insight: Limited  Judgement: Fair    Last Recorded Vitals  Blood pressure 140/80, pulse 80, temperature 36.5 °C (97.7 °F), resp. rate 16, height 1.676 m (5' 5.98\"), weight 68.6 kg (151 lb 3.8 oz), SpO2 95 %.  Intake/Output last 3 Shifts:  I/O last 3 completed shifts:  In: 240 (3.5 mL/kg) [P.O.:240]  Out: - (0 mL/kg)   Weight: 68.6 kg     Relevant Results  Scheduled medications  carvedilol, 6.25 mg, oral, BID  enoxaparin, 40 mg, subcutaneous, Daily  folic acid, 1 mg, oral, Daily  insulin glargine, 20 Units, subcutaneous, Nightly  insulin lispro, 0-15 Units, subcutaneous, TID with meals  insulin lispro, 8 Units, subcutaneous, TID with meals  levETIRAcetam, 750 mg, oral, BID  lisinopril, 40 mg, oral, Daily  multivitamin with minerals, 1 tablet, oral, Daily  potassium, sodium phosphates, 2 packet, oral, Once  thiamine, 100 mg, oral, Daily      Continuous medications     PRN " medications  PRN medications: acetaminophen, dextrose 10 % in water (D10W), dextrose, glucagon, hydrALAZINE, OLANZapine     Results for orders placed or performed during the hospital encounter of 10/01/23 (from the past 24 hour(s))   POCT GLUCOSE   Result Value Ref Range    POCT Glucose 234 (H) 74 - 99 mg/dL   POCT GLUCOSE   Result Value Ref Range    POCT Glucose 254 (H) 74 - 99 mg/dL   POCT GLUCOSE   Result Value Ref Range    POCT Glucose 259 (H) 74 - 99 mg/dL   Renal function panel   Result Value Ref Range    Glucose 232 (H) 74 - 99 mg/dL    Sodium 137 136 - 145 mmol/L    Potassium 3.7 3.5 - 5.3 mmol/L    Chloride 99 98 - 107 mmol/L    Bicarbonate 25 21 - 32 mmol/L    Anion Gap 17 10 - 20 mmol/L    Urea Nitrogen 18 6 - 23 mg/dL    Creatinine 0.62 0.50 - 1.05 mg/dL    eGFR >90 >60 mL/min/1.73m*2    Calcium 9.1 8.6 - 10.6 mg/dL    Phosphorus 3.4 2.5 - 4.9 mg/dL    Albumin 3.1 (L) 3.4 - 5.0 g/dL   Magnesium   Result Value Ref Range    Magnesium 2.01 1.60 - 2.40 mg/dL   CBC   Result Value Ref Range    WBC 12.1 (H) 4.4 - 11.3 x10*3/uL    nRBC 0.0 0.0 - 0.0 /100 WBCs    RBC 4.13 4.00 - 5.20 x10*6/uL    Hemoglobin 13.2 12.0 - 16.0 g/dL    Hematocrit 39.9 36.0 - 46.0 %    MCV 97 80 - 100 fL    MCH 32.0 26.0 - 34.0 pg    MCHC 33.1 32.0 - 36.0 g/dL    RDW 11.2 (L) 11.5 - 14.5 %    Platelets 304 150 - 450 x10*3/uL    MPV 11.4 7.5 - 11.5 fL   POCT GLUCOSE   Result Value Ref Range    POCT Glucose 155 (H) 74 - 99 mg/dL         Assessment/Plan   Principal Problem:    Altered mental status  Active Problems:    Fever    Abnormal CSF cytology    Aspiration pneumonia of both lower lobes (CMS/HCC)    Impression:  Delirium    Recommendations:  Safety/Monitoring:  Patient does not meet criteria for inpatient psychiatric admission  Patient does not require 1:1 observation, given enhanced safety features  Daily interdisciplinary safety and planning huddle with Psychiatry  Video monitoring as with all patients on the MPU  Psychiatry will  follow patient daily while on the MPU    Medications:  If QTc < 500ms, recommend olanzapine 2.5mg PO/IM Q8H PRN acute agitation  On discharge, would recommend quetiapine 12.5mg PO at bedtime PRN anxiety/agitation    Considerations:  Delirium guidelines while in the hospital, especially reorientation and regulation of circadian rhythm    Disposition/Discharge Planning:  SW following, appreciate assistance      Bradley Drew MD

## 2023-10-06 NOTE — PROGRESS NOTES
10/06/23 0747   Discharge Planning   Living Arrangements Parent   Support Systems Parent   Assistance Needed Pt will need acute rehab at discharge.   Type of Residence Private residence  (Pt lives in the [facility].)   Do you have animals or pets at home? No   Who is requesting discharge planning? Patient   Home or Post Acute Services Post acute facilities (Rehab/SNF/etc)   Type of Post Acute Facility Services Rehab   Patient expects to be discharged to: Acute rehab   Does the patient need discharge transport arranged? Yes   RoundTrip coordination needed? Yes   Has discharge transport been arranged? No   What day is the transport expected? 10/07/23     Assessment Note:  Met with pt and introduced myself as care coordinator and member of the Care Transitions team for discharge planning.  Pt's primary support person is her mother. Pt helps care for her mother. Pt feels safe at home.  Pt [place] to [place].  Pt's address, phone number and contact information was verified, address corrected.  Pt does not have any questions/concerns at this time.     Previous Home Care: None  DME: glucometer    Transitional Care Coordination Progress Note:  Patient was discussed during interdisciplinary rounds.  Team members present: medical team, and TCC.  Plan per medical team: Pt admitted s/p fall and change in mental status.  Pt with acute encephalopathy, plan to r/o West Nile Virus and follow up ID recommendations.  Pt does not meet criteria for inpt psych.  Payer: [facility]  Status: Inpatient  Discharge disposition: PT is recommending high intensity, await OT evaluation.  Plan to offer pt rehab preferences.  Potential barriers: None  ADOD: 10/7  Care coordinator will continue to follow for discharge planning needs.   Parisa Julio MSN, RN-BC  Transitional Care Coordinator (TCC)  [phone]

## 2023-10-06 NOTE — CARE PLAN
Problem: Balance  Goal: STG - Maintains dynamic sitting & standing balance with upper extremity support and SBA  Outcome: Met     Problem: Mobility  Goal: STG - Patient will ambulate >100' with CGA using LRD  Outcome: Met     Problem: Transfers  Goal: STG - Patient will perform bed mobility INDEP  Outcome: Met  Goal: STG - Patient will transfer sit to and from stand with SBA using LRD as needed  Outcome: Met     Problem: Mobility  Goal: STG - Patient will ambulate >100' independently at an appropriate pace.   Outcome: Progressing  Goal: STG - Patient will ascend and descend a flight of stairs  Outcome: Progressing     Problem: Safety  Goal: Pt will perform mobility at an appropriate pace.  Outcome: Progressing

## 2023-10-06 NOTE — PROGRESS NOTES
Spiritual Care Visit     Spiritual Care in to see patient, however, patient was sleeping.  left her card and will return to see patient again.

## 2023-10-06 NOTE — CARE PLAN
Problem: COGNITION/SAFETY  Goal: Pt. will score within normal cognition on Short Blessed Test.   Outcome: Progressing     Problem: ADLs  Goal: Pt. Will complete LB dressing with MOD I.    Outcome: Progressing  Goal: Pt. will complete toileting with MOD I.   Outcome: Progressing  Goal: Pt. will complete bathing with MOD I.   Outcome: Progressing     Problem: TRANSFERS  Goal: Pt. Will complete stand pivot transfer with MOD I using LRAD.    Outcome: Progressing     Problem: MOBILITY  Goal: Pt. Will demo household distance functional mobility with MOD I using LRAD.    Outcome: Progressing

## 2023-10-06 NOTE — PROGRESS NOTES
"Valentina Fontanez is a 58 y.o. female on day 5 of admission presenting with Altered mental status.    Subjective   Patient examined this AM resting in bed in NAD. Patient does not have any complaints and denies f/c SOB chest pain headache vision changes. She is more oriented today x4.    Review of Systems   Review of Systems   Constitutional:  Negative for chills, fatigue, fever and unexpected weight change.   Eyes:  Negative for visual disturbance.   Respiratory:  Negative for cough and shortness of breath.    Cardiovascular:  Negative for chest pain, palpitations and leg swelling.   Gastrointestinal:  Negative for abdominal pain, blood in stool, constipation, diarrhea, nausea and vomiting.   Genitourinary:  Negative for dysuria and flank pain.   Musculoskeletal:  Negative for arthralgias, back pain and joint swelling.   Skin:  Negative for pallor and rash.   Neurological:  Negative for dizziness, syncope, weakness, light-headedness, numbness and headaches.   Psychiatric/Behavioral:  Negative for confusion and dysphoric mood. The patient is not nervous/anxious.              Objective     Last Recorded Vitals  Blood pressure (!) 159/94, pulse 76, temperature 36.2 °C (97.2 °F), temperature source Temporal, resp. rate 14, height 1.676 m (5' 5.98\"), weight 68.6 kg (151 lb 3.8 oz), SpO2 97 %.  Intake/Output last 3 Shifts:  I/O last 3 completed shifts:  In: 240 (3.5 mL/kg) [P.O.:240]  Out: - (0 mL/kg)   Weight: 68.6 kg     Physical Exam  Constitutional:       General: She is not in acute distress.     Appearance: Normal appearance.   HENT:      Head: Normocephalic and atraumatic.      Mouth/Throat:      Mouth: Mucous membranes are moist.      Pharynx: Oropharynx is clear.   Eyes:      General: No scleral icterus.     Extraocular Movements: Extraocular movements intact.      Conjunctiva/sclera: Conjunctivae normal.      Pupils: Pupils are equal, round, and reactive to light.   Cardiovascular:      Rate and Rhythm: Normal rate " and regular rhythm.      Pulses: Normal pulses.      Heart sounds: Normal heart sounds. No murmur heard.     No friction rub. No gallop.   Pulmonary:      Effort: Pulmonary effort is normal.      Breath sounds: Normal breath sounds. No wheezing, rhonchi or rales.   Abdominal:      General: Abdomen is flat. Bowel sounds are normal. There is no distension.      Palpations: Abdomen is soft.      Tenderness: There is no abdominal tenderness. There is no right CVA tenderness, left CVA tenderness, guarding or rebound.   Musculoskeletal:      Cervical back: Normal range of motion and neck supple.      Right lower leg: No edema.      Left lower leg: No edema.   Skin:     General: Skin is warm and dry.      Findings: No rash.   Neurological:      General: No focal deficit present.      Mental Status: She is alert and oriented to person, place, and time.         Relevant Results    Labs    Results from last 7 days   Lab Units 10/05/23  1040 10/04/23  0635 10/03/23  0411   WBC AUTO x10*3/uL 11.0 16.4* 13.6*   HEMOGLOBIN g/dL 14.0 15.1 11.9*   HEMATOCRIT % 46.9* 45.8 35.8*   PLATELETS AUTO x10*3/uL 262 264 188            Results from last 7 days   Lab Units 10/05/23  1040 10/04/23  1954 10/04/23  0635   SODIUM mmol/L 139 140 144   POTASSIUM mmol/L 3.1* 4.0 2.9*   CHLORIDE mmol/L 100 102 104   CO2 mmol/L 25 16* 25   BUN mg/dL 8 10 15   CREATININE mg/dL 0.53 0.63 0.53   CALCIUM mg/dL 9.2 9.7 10.1     Results from last 7 days   Lab Units 10/01/23  1033   ALK PHOS U/L 71   BILIRUBIN TOTAL mg/dL 1.0   PROTEIN TOTAL g/dL 7.2   ALT U/L 16   AST U/L 24      Results from last 7 days   Lab Units 10/01/23  1033   INR  1.1        Medications  Scheduled medications  carvedilol, 3.125 mg, oral, BID  enoxaparin, 40 mg, subcutaneous, Daily  folic acid, 1 mg, oral, Daily  insulin glargine, 15 Units, subcutaneous, Nightly  insulin lispro, 0-15 Units, subcutaneous, TID with meals  insulin lispro, 5 Units, subcutaneous, TID with  meals  levETIRAcetam, 750 mg, oral, BID  lisinopril, 40 mg, oral, Daily  multivitamin with minerals, 1 tablet, oral, Daily  potassium, sodium phosphates, 2 packet, oral, Once  thiamine, 100 mg, oral, Daily      Continuous medications     PRN medications  PRN medications: acetaminophen, dextrose 10 % in water (D10W), dextrose, glucagon, hydrALAZINE, OLANZapine     Imaging  XR chest 1 view   Final Result   1. Ill-defined and faint airspace opacity in bilateral perihilar   region. Findings might be atelectatic, however correlate with concern   for infection. Recommend follow-up radiograph.                  Signed by: Waqas Anderson 10/5/2023 9:42 AM   Dictation workstation:   [id number]      MR brain w and wo IV contrast   Final Result   1. No acute intracranial abnormality and no abnormal intracranial   enhancement or mass. No mass effect.   2. Chronic intracranial findings as above including findings of   probable chronic small vessel ischemic changes and probable old small   lacunar infarcts.        I personally reviewed the images/study and I agree with the findings   as stated. This study was interpreted at [place], [place].        MACRO:   None.        Signed by: Price Posey 10/3/2023 1:23 AM   Dictation workstation:   [id number]      XR abdomen 1 view   Final Result   1.  Enteric tube projecting over the gastric fundus.        Signed by: Waqas Anderson 10/2/2023 2:40 PM   Dictation workstation:   [id number]      XR abdomen 1 view   Final Result   1.  Enteric tube is unchanged in position with the tip projecting   over proximal stomach and side hole projecting over distal esophagus.   Consider advancing.        Signed by: Waqas Anderson 10/2/2023 1:03 PM   Dictation workstation:   [id number]      XR abdomen 1 view   Final Result   1.  Enteric tube is in place with the tip projecting over distal   esophagus. Consider adjustment/advancement.         Signed by: Waqas Anderson 10/2/2023 12:11 PM   Dictation workstation:   [id number]      XR abdomen 1 view   Final Result   1.  Enteric tube as described above. Consider advancement.   2. Nonobstructive bowel gas pattern.        Signed by: Waqas Anderson 10/2/2023 7:28 AM   Dictation workstation:   [id number]      XR chest 1 view   Final Result   1.  Enteric tube with tip overlying the expected location of the   gastroesophageal junction, repositioning is recommended.   2. Faint airspace opacity in lung bases, likely atelectatic.   3. Persistent elevation of the right hemidiaphragm.   4.  Additional medical devices as described above.        I personally reviewed the images/study and I agree with the findings   as stated. This study was interpreted at [place], [place].        MACRO:   None        Signed by: Waqas Anderson 10/1/2023 8:38 PM   Dictation workstation:   [id number]      XR abdomen 1 view   Final Result   1. Enteric tube tip located in the region of the distal esophagus.   This should be advanced.   2. Nonspecific bowel gas pattern. No definite evidence of mechanical   obstruction..   Signed by Johnny Medina MD      CT head W O contrast trauma protocol   Final Result   1. No acute large territorial infarct or intracranial hemorrhage.   2. No evidence for significant stenosis or large branch vessel   cutoffs of the intracranial vessels. No evidence of traumatic   arterial injury.   3. No traumatic spondylolisthesis of the cervical spine.   4. Enteric tube coils within the pharynx and terminates within the   right piriform sinus and recommend repositioning.   5. Endotracheal tube with its tip terminating approximately 1.6 cm   above the level of the rubens.             I personally reviewed the images/study, and I agree with the findings   as stated above. This study was interpreted at [facility],  [place], [place].        MACRO:   None             Dictation workstation:   [id number]      CT cervical spine wo IV contrast   Final Result   1. No acute large territorial infarct or intracranial hemorrhage.   2. No evidence for significant stenosis or large branch vessel   cutoffs of the intracranial vessels. No evidence of traumatic   arterial injury.   3. No traumatic spondylolisthesis of the cervical spine.   4. Enteric tube coils within the pharynx and terminates within the   right piriform sinus and recommend repositioning.   5. Endotracheal tube with its tip terminating approximately 1.6 cm   above the level of the rubens.             I personally reviewed the images/study, and I agree with the findings   as stated above. This study was interpreted at [place], [place].        MACRO:   None             Dictation workstation:   [id number]      CT thoracic spine wo IV contrast   Final Result   Bibasilar pulmonary infiltrates.   Hepatic steatosis.   Fibroid uterus.   Otherwise unremarkable CT scan of the chest, abdomen, pelvis, thoracic   and lumbosacral spines. There are no acute fractures.   Signed by Cory Crockett MD      CT lumbar spine wo IV contrast   Final Result   Bibasilar pulmonary infiltrates.   Hepatic steatosis.   Fibroid uterus.   Otherwise unremarkable CT scan of the chest, abdomen, pelvis, thoracic   and lumbosacral spines. There are no acute fractures.   Signed by Cory Crockett MD      CT chest abdomen pelvis w IV contrast   Final Result   Bibasilar pulmonary infiltrates.   Hepatic steatosis.   Fibroid uterus.   Otherwise unremarkable CT scan of the chest, abdomen, pelvis, thoracic   and lumbosacral spines. There are no acute fractures.   Signed by Cory Crockett MD      CT angio head neck w and wo IV contrast   Final Result   1. No acute large territorial infarct or intracranial hemorrhage.   2. No evidence for significant stenosis or large branch  vessel   cutoffs of the intracranial vessels. No evidence of traumatic   arterial injury.   3. No traumatic spondylolisthesis of the cervical spine.   4. Enteric tube coils within the pharynx and terminates within the   right piriform sinus and recommend repositioning.   5. Endotracheal tube with its tip terminating approximately 1.6 cm   above the level of the rubens.             I personally reviewed the images/study, and I agree with the findings   as stated above. This study was interpreted at [facility], [place], [place].        MACRO:   None             Dictation workstation:   [id number]      XR pelvis 1-2 views   Final Result   No acute fracture or dislocation..   Signed by Johnny Medina MD      XR chest 1 view   Final Result   No focal pulmonary consolidation pleural effusions..   Signed by Johnny Medina MD               Assessment/Plan   Principal Problem:    Altered mental status  Active Problems:    Fever    Abnormal CSF cytology    Aspiration pneumonia of both lower lobes (CMS/HCC)    Valentina Fontanez is a 52 yo F with reported T2D (per mother, no records available) who presented to the ED after being found down by mother the morning of admission. Pt reportedly seized en route to hospital, s/p IM Versed by EMS. GCS 4 in ED, s/p intubation, admitted to MICU. Initially treated with insulin gtt empirically for hyperglycemia, but presentation not felt to be DKA.  LP done c/w viral encephalitis, but PCR panel negative (including HSV). Previously on vanc/ceftriaxone/doxy/acyclovir, per ID can narrow treatment to ceftriaxone/doxy for CAP/aspiration pneumonia. 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 Mental status is improving.      Updates 10/6:  -Mental status improving, is oriented today  -Titrating up coreg and insulin  -PT and PM&R rec acute rehab, pending OT recs        #Acute encephalopathy  #Seizure  #Viral  meningoencephalitis  ::Encephalopathy, seizures possibly secondary to viral encephalitis vs substance withdrawal  ::CT Head, CTA Head/Neck, MRI Brain negative for acute abnormalities  ::cvEEG showing epileptiform activity over left parietal lobe  ::Persistently febrile on admission, now afebrile since 10/2  ::LP done, lab work demonstrating WBC count of 27, protein 47, glucose 116, lymphocytes 45%  ::Meningitis PCR panel negative including HSV negative  -West Nile IgM and IgG ordered  -cvEEG discontinued  -c/w Keppra 750mg BID PO  -Stopped acyclovir/vancomycin per ID recs  -Tylenol PRN for fever     #Acute agitation/delirium  ::Possibly iatrogenic in setting of alternating propofol/precedex drips and midazolam/phenobarbital pushes in MICU  ::Reorientable by family  ::Patient pleasant and calm on exam for past 2 days  -Encourage frequent family reorientation  -Psychiatry following in MPU, appreciate recs              -c/wPRN olanzapine 2.5mg for agitation          #C/f alcohol use disorder  -D/c CIWA  -Favor acute viral encephalitis as cause of AMS rather than withdrawal  -s/p IV Thiamine 500mg TID  -folic acid 1mg every day  -thiamine 100mg every day     #Hypertension  ::Initial blood pressures as high as 239/127  ::S/p nicardipine drip  ::Has been persistently hypertensive in 180s/190s  -Restarted home lisinopril 40mg  -Increased coreg to 6.25 BID, will increase as necessary  -Hydral 25mg PO PRN for SBP>180        #Acute hypoxic respiratory failure  #Aspiration pneumonia vs pneumonitis  ::S/p extubation on early 10/3 now on 2L NC  ::Right basilar infiltrates seen on CT Chest consistent with aspiration  ::Legionella, Strep Ag tests negative  ::Procal of 0.41  ::MRSA nares+  ::Blood cultures NGTD  -CTX and doxy dc'ed per ID recs, low concern for PNA       #YUMI, resolved  ::Reported, although no known baseline  ::Likely prerenal in setting of infection, seizures. Resolved with IVF  ::Urine lytes showing FENa of  <.01%  -Daily RFP, avoid nephrotoxins, strict I&O     #Hyperglycemia  ::HbA1c 13.4%, hx of DM2 on metformin at home  ::Low concern for DKA/HHS, sp insulin ggt  ::BG still in 200s in morning, post prandial  -Increased lantus to 20u at bedtime, on insulin 8u AC and SSI, continue to titrate     #C/f rhabdomyolysis, resolved  ::CK likely elevated in setting of seizure  ::CK uptrended to 1019, now 632 s/p 1L  -No ongoing seizures, stop trending CK       F: PRN  E: PRN  N: Diabetic Diet  A: pIV    DVT ppx: Lovenox  GI ppx: Not indicated    CODE STATUS: Full Code   Surrogate Decision Maker: Christo Braun ([phone])      Case seen and discussed with attending Dr. Márquez, to addend as necessary.    Ran Reis MD PGY-1

## 2023-10-06 NOTE — SIGNIFICANT EVENT
Called by charge nurse on [facility] between 6:30 and 7PM. Stated they have empty beds and requested transfer cancellation. I stated that patient does not have psych needs. She stated that patient's do not have to be psych patients to stay on [facility]. Stated they only have 4 patients and can keep the patient since dispo is discharge soon.     Called bed assignment to confirm, was left on hold then got the  again who said there are many people on hold.     Given late time of day, transfer order cancelled and will revisit tomorrow

## 2023-10-06 NOTE — PROGRESS NOTES
Physical Therapy    Physical Therapy Treatment    Patient Name: Valentina Fontanez  MRN: [id number]  Today's Date: 10/6/2023          Assessment/Plan   Rehab Prognosis: Excellent  Medical Staff Made Aware: Yes  End of Session Communication: Bedside nurse  Assessment Comment: SPT: supervising therapist present and assisted in clincial decisions  End of Session Patient Position: Alarm off, not on at start of session (seated EOB with all needs within reach)  PT Plan  Inpatient/Swing Bed or Outpatient: Inpatient    PT Plan: Skilled PT  PT Frequency: 2 times per week (frequency and d/c req were updated this date on 10/6)  PT Discharge Recommendations: Low intensity level of continued care      General Visit Information:   PT  Visit  PT Received On: 10/06/23  [id number]  General Comment: Pt supine in bed upon entry. Eager and very cooperative to work with. Mentioned more than once during tx  that she will do what she needs to with therapy so that she can go back home.      Precautions:  Precautions  Medical Precautions: Fall precautions         Objective   Pain:  Pain Assessment  Pain Assessment: 0-10  Pain Score: 0 - No pain           Treatments:  Therapeutic Exercise  Therapeutic Exercise Performed: No (clinician had planned to begin with ther ex, but pt had already gotten up and out of bed stating she was ready to walk)    Bed Mobility 1  Bed Mobility 1: Supine to sitting  Level of Assistance 1: Distant supervision  Bed Mobility Comments 1: pt sat EOB as soon as clinician entered room before instructions were even provided    Ambulation/Gait Training  Ambulation/Gait Training Performed: Yes  Ambulation/Gait Training 1  Surface 1: Level tile  Device 1: No device  Assistance 1: Close supervision  Comments/Distance (ft) 1: 2 x 55 feet, pt amb at a rapid pace and advised to slow down in order to be safer  Transfers  Transfer: Yes  Transfer 1  Transfer From 1: Sit to  Transfer to 1: Stand  Transfer Level of Assistance 1: Close  supervision  Transfers 2  Transfer From 2: Stand to  Transfer to 2: Sit  Transfer Level of Assistance 2: Close supervision    Stairs  Stairs: Yes (simulated stair activity with lip in shower, pt able to step up, back, and over leading with R leg and L leg without use of railing, CGA)    Outcome Measures:  [facility] Basic Mobility  Turning from your back to your side while in a flat bed without using bedrails: None  Moving from lying on your back to sitting on the side of a flat bed without using bedrails: None  Moving to and from bed to chair (including a wheelchair): None  Standing up from a chair using your arms (e.g. wheelchair or bedside chair): None  To walk in hospital room: None  Climbing 3-5 steps with railing: None  Basic Mobility - Total Score: 24    Education Documentation  Precautions, taught by PATTIE CASTRO at 10/6/2023 11:25 AM.  Learner: Patient  Readiness: Eager  Method: Explanation, Demonstration  Response: Verbalizes Understanding    Mobility Training, taught by PATTIE CASTRO at 10/6/2023 11:25 AM.  Learner: Patient  Readiness: Eager  Method: Explanation, Demonstration  Response: Verbalizes Understanding          OP EDUCATION:  Education  Individual(s) Educated: Patient  Education Provided: Fall Risk  Patient/Caregiver Demonstrated Understanding: yes    Encounter Problems       Encounter Problems (Active)       Mobility       STG - Patient will ambulate >100' independently at an appropriate pace.  (Progressing)       Start:  10/06/23    Expected End:  10/20/23            STG - Patient will ascend and descend a flight of stairs (Progressing)       Start:  10/06/23    Expected End:  10/20/23                 Safety       Pt will perform mobility at an appropriate pace. (Progressing)       Start:  10/06/23    Expected End:  10/20/23                  Encounter Problems (Resolved)       Balance       STG - Maintains dynamic sitting & standing balance with upper extremity support and SBA (Met)       Start:   10/04/23    Expected End:  10/18/23    Resolved:  10/06/23            Mobility       STG - Patient will ambulate >100' with CGA using LRD (Met)       Start:  10/04/23    Expected End:  10/18/23    Resolved:  10/06/23            Transfers       STG - Patient will perform bed mobility INDEP (Met)       Start:  10/04/23    Expected End:  10/18/23    Resolved:  10/06/23         STG - Patient will transfer sit to and from stand with SBA using LRD as needed (Met)       Start:  10/04/23    Expected End:  10/18/23    Resolved:  10/06/23

## 2023-10-06 NOTE — CARE PLAN
The patient's goals for the shift include      The clinical goals for the shift include Pt will no fall by the end of this shift    Over the shift, the patient did not make progress toward the following goals. Barriers to progression include lack of safety orientation . Recommendations to address these barriers include continuing to have standby assist when ambulating.

## 2023-10-06 NOTE — CARE PLAN
The patient's goals for the shift include      The clinical goals for the shift include Pt. will maintain a stable Bp through end of shift 10/6 1900.    Pt. Has been A&O X3, forgetful at times. Calm and cooperative. Over the shift, the patient did not make progress toward maintaining a stable Bp. Pt. Was medicated with as needed hydralazine for an elevated Bp of 178-180/ 107.

## 2023-10-07 ENCOUNTER — PHARMACY VISIT (OUTPATIENT)
Dept: PHARMACY | Facility: CLINIC | Age: 58
End: 2023-10-07
Payer: COMMERCIAL

## 2023-10-07 VITALS
DIASTOLIC BLOOD PRESSURE: 102 MMHG | HEART RATE: 85 BPM | WEIGHT: 151.24 LBS | HEIGHT: 66 IN | BODY MASS INDEX: 24.31 KG/M2 | OXYGEN SATURATION: 97 % | TEMPERATURE: 97.7 F | RESPIRATION RATE: 18 BRPM | SYSTOLIC BLOOD PRESSURE: 177 MMHG

## 2023-10-07 LAB
ALBUMIN SERPL BCP-MCNC: 3 G/DL (ref 3.4–5)
ANION GAP SERPL CALC-SCNC: 15 MMOL/L (ref 10–20)
BUN SERPL-MCNC: 18 MG/DL (ref 6–23)
CALCIUM SERPL-MCNC: 9.2 MG/DL (ref 8.6–10.6)
CHLORIDE SERPL-SCNC: 100 MMOL/L (ref 98–107)
CO2 SERPL-SCNC: 27 MMOL/L (ref 21–32)
CREAT SERPL-MCNC: 0.76 MG/DL (ref 0.5–1.05)
ERYTHROCYTE [DISTWIDTH] IN BLOOD BY AUTOMATED COUNT: 11.2 % (ref 11.5–14.5)
GFR SERPL CREATININE-BSD FRML MDRD: 90 ML/MIN/1.73M*2
GLUCOSE BLD MANUAL STRIP-MCNC: 228 MG/DL (ref 74–99)
GLUCOSE BLD MANUAL STRIP-MCNC: 236 MG/DL (ref 74–99)
GLUCOSE SERPL-MCNC: 258 MG/DL (ref 74–99)
HCT VFR BLD AUTO: 37.2 % (ref 36–46)
HGB BLD-MCNC: 12.7 G/DL (ref 12–16)
MAGNESIUM SERPL-MCNC: 1.82 MG/DL (ref 1.6–2.4)
MCH RBC QN AUTO: 31.6 PG (ref 26–34)
MCHC RBC AUTO-ENTMCNC: 34.1 G/DL (ref 32–36)
MCV RBC AUTO: 93 FL (ref 80–100)
NRBC BLD-RTO: 0 /100 WBCS (ref 0–0)
PHOSPHATE SERPL-MCNC: 3.9 MG/DL (ref 2.5–4.9)
PLATELET # BLD AUTO: 334 X10*3/UL (ref 150–450)
PMV BLD AUTO: 11.5 FL (ref 7.5–11.5)
POTASSIUM SERPL-SCNC: 4 MMOL/L (ref 3.5–5.3)
RBC # BLD AUTO: 4.02 X10*6/UL (ref 4–5.2)
SODIUM SERPL-SCNC: 138 MMOL/L (ref 136–145)
WBC # BLD AUTO: 10.3 X10*3/UL (ref 4.4–11.3)

## 2023-10-07 PROCEDURE — 2500000001 HC RX 250 WO HCPCS SELF ADMINISTERED DRUGS (ALT 637 FOR MEDICARE OP)

## 2023-10-07 PROCEDURE — 2500000001 HC RX 250 WO HCPCS SELF ADMINISTERED DRUGS (ALT 637 FOR MEDICARE OP): Performed by: STUDENT IN AN ORGANIZED HEALTH CARE EDUCATION/TRAINING PROGRAM

## 2023-10-07 PROCEDURE — 96372 THER/PROPH/DIAG INJ SC/IM: CPT

## 2023-10-07 PROCEDURE — 36415 COLL VENOUS BLD VENIPUNCTURE: CPT

## 2023-10-07 PROCEDURE — 2500000004 HC RX 250 GENERAL PHARMACY W/ HCPCS (ALT 636 FOR OP/ED)

## 2023-10-07 PROCEDURE — 99238 HOSP IP/OBS DSCHRG MGMT 30/<: CPT | Performed by: INTERNAL MEDICINE

## 2023-10-07 PROCEDURE — 83735 ASSAY OF MAGNESIUM: CPT

## 2023-10-07 PROCEDURE — RXMED WILLOW AMBULATORY MEDICATION CHARGE

## 2023-10-07 PROCEDURE — 82947 ASSAY GLUCOSE BLOOD QUANT: CPT

## 2023-10-07 PROCEDURE — 2500000002 HC RX 250 W HCPCS SELF ADMINISTERED DRUGS (ALT 637 FOR MEDICARE OP, ALT 636 FOR OP/ED)

## 2023-10-07 PROCEDURE — 99231 SBSQ HOSP IP/OBS SF/LOW 25: CPT | Performed by: PSYCHIATRY & NEUROLOGY

## 2023-10-07 PROCEDURE — 85027 COMPLETE CBC AUTOMATED: CPT

## 2023-10-07 PROCEDURE — 80069 RENAL FUNCTION PANEL: CPT

## 2023-10-07 RX ORDER — QUETIAPINE FUMARATE 25 MG/1
12.5 TABLET, FILM COATED ORAL NIGHTLY PRN
Qty: 15 TABLET | Refills: 1 | Status: SHIPPED | OUTPATIENT
Start: 2023-10-07 | End: 2024-02-21

## 2023-10-07 RX ORDER — INSULIN GLARGINE 100 [IU]/ML
25 INJECTION, SOLUTION SUBCUTANEOUS NIGHTLY
Qty: 15 ML | Refills: 1 | Status: SHIPPED | OUTPATIENT
Start: 2023-10-07 | End: 2024-03-26 | Stop reason: SDUPTHER

## 2023-10-07 RX ORDER — INSULIN GLARGINE 100 [IU]/ML
25 INJECTION, SOLUTION SUBCUTANEOUS NIGHTLY
Qty: 15 ML | Refills: 0 | Status: SHIPPED | OUTPATIENT
Start: 2023-10-07 | End: 2023-10-07 | Stop reason: SDUPTHER

## 2023-10-07 RX ORDER — CARVEDILOL 12.5 MG/1
12.5 TABLET ORAL 2 TIMES DAILY
Qty: 120 TABLET | Refills: 1 | Status: SHIPPED | OUTPATIENT
Start: 2023-10-07 | End: 2024-03-26 | Stop reason: SDUPTHER

## 2023-10-07 RX ORDER — INSULIN GLARGINE 100 [IU]/ML
25 INJECTION, SOLUTION SUBCUTANEOUS NIGHTLY
Qty: 10 ML | Refills: 3 | Status: SHIPPED | OUTPATIENT
Start: 2023-10-07 | End: 2023-10-07 | Stop reason: SDUPTHER

## 2023-10-07 RX ORDER — INSULIN LISPRO 100 [IU]/ML
8 INJECTION, SOLUTION INTRAVENOUS; SUBCUTANEOUS
Qty: 15 ML | Refills: 0 | Status: SHIPPED | OUTPATIENT
Start: 2023-10-07

## 2023-10-07 RX ORDER — BLOOD SUGAR DIAGNOSTIC
1 STRIP MISCELLANEOUS
Qty: 100 EACH | Refills: 1 | Status: SHIPPED | OUTPATIENT
Start: 2023-10-07 | End: 2023-12-06

## 2023-10-07 RX ADMIN — INSULIN LISPRO 6 UNITS: 100 INJECTION, SOLUTION INTRAVENOUS; SUBCUTANEOUS at 13:44

## 2023-10-07 RX ADMIN — ENOXAPARIN SODIUM 40 MG: 40 INJECTION SUBCUTANEOUS at 08:53

## 2023-10-07 RX ADMIN — INSULIN LISPRO 8 UNITS: 100 INJECTION, SOLUTION INTRAVENOUS; SUBCUTANEOUS at 09:44

## 2023-10-07 RX ADMIN — LISINOPRIL 40 MG: 40 TABLET ORAL at 08:53

## 2023-10-07 RX ADMIN — INSULIN LISPRO 6 UNITS: 100 INJECTION, SOLUTION INTRAVENOUS; SUBCUTANEOUS at 09:42

## 2023-10-07 RX ADMIN — CARVEDILOL 12.5 MG: 25 TABLET, FILM COATED ORAL at 08:53

## 2023-10-07 RX ADMIN — INSULIN LISPRO 8 UNITS: 100 INJECTION, SOLUTION INTRAVENOUS; SUBCUTANEOUS at 13:45

## 2023-10-07 RX ADMIN — LEVETIRACETAM 750 MG: 750 TABLET, FILM COATED ORAL at 08:53

## 2023-10-07 RX ADMIN — Medication 1 TABLET: at 08:53

## 2023-10-07 RX ADMIN — THIAMINE HCL TAB 100 MG 100 MG: 100 TAB at 08:53

## 2023-10-07 RX ADMIN — FOLIC ACID 1 MG: 1 TABLET ORAL at 08:53

## 2023-10-07 ASSESSMENT — PAIN - FUNCTIONAL ASSESSMENT: PAIN_FUNCTIONAL_ASSESSMENT: 0-10

## 2023-10-07 ASSESSMENT — COLUMBIA-SUICIDE SEVERITY RATING SCALE - C-SSRS
1. SINCE LAST CONTACT, HAVE YOU WISHED YOU WERE DEAD OR WISHED YOU COULD GO TO SLEEP AND NOT WAKE UP?: NO
2. HAVE YOU ACTUALLY HAD ANY THOUGHTS OF KILLING YOURSELF?: NO
6. HAVE YOU EVER DONE ANYTHING, STARTED TO DO ANYTHING, OR PREPARED TO DO ANYTHING TO END YOUR LIFE?: NO

## 2023-10-07 ASSESSMENT — PAIN SCALES - GENERAL: PAINLEVEL_OUTOF10: 0 - NO PAIN

## 2023-10-07 NOTE — DISCHARGE SUMMARY
Discharge Diagnosis  Altered mental status  Acute Viral Meningitis    Issues Requiring Follow-Up  -Follow up with PCP for blood pressure control, DM2 regimen, resolution of encephalitis  -Follow up with epilepsy clinic regarding your keppra    Test Results Pending At Discharge  Pending Labs       Order Current Status    CBC and Auto Differential Collected (10/02/23 0305)    Lactate Collected (10/02/23 0255)    Lactate Collected (10/02/23 0506)    BLOOD GAS VENOUS FULL PANEL In process    Extra Tubes In process    Pathologist Review-Cell Count,CSF In process    Phosphatidylethanol (PEth), Whole Blood, Quantitative In process    Urine Frias Tube In process    CSF Culture/Smear Preliminary result            Hospital Course  Her mother states that she was sleeping when she heard fast, heavy breathing and found her daughter at the bottom of the stairs (~7 steps). She kept calling her daughter's name, but her daughter was making incoherent noises and unresponsive. States that she was staring straight ahead, with slurred speech, thrashing and wet herself, so she called EMS. Prior to this, patient's LKN was Saturday morning when she was downstairs making breakfast (pt lives upstairs, mom downstairs). Pt was not complaining of anything at the time and appeared in her usual state of health. However, mother states that her brother recently  on Thursday and has been feeling down. Denies of depression, suicide attempts, or prior sz. Asked her daughter if she ingested anything, but unable to respond. The mother states that her other brother had concerns that she may be a closet alcoholic after finding multiple wine bottles in her home while helping her move, but the mother states that she has not seen her daughter drinking. Per ED notes, patient has been binge drinking. En route, patient seized and given 5mg IM Versed. POC glucose 563.      Upon arrival to the ED, pt noted to have some decorticate posturing. Pt in cervical  "collar, on mask ventilation. GCS 4, intubated in the trauma bay (with etomidate 10mg half dose and lawanda 100 per ED note) AC VC 18/450/5 and placed on prop/fent due to agitation. Trauma primary and secondary survey unremarkable, CT imaging remarkable for bibasilar infiltrates. No acute trauma surgical intervention required per gen surg note. Neurology consulted for encephalopathy who recommended LP, MRI, cvEEG. LP performed overnight on 10/1 which revealed a viral pattern of CSF but PCR was negative for any organisms. She remained on empiric antimicrobial coverage with vancomycin, ceftriaxone, ampicillin, acyclovir . Weaned off sedation on 10/2.    Patient was extubated on 10/3 and transferred to medicine for continued management of viral encephalitis. Antivirals were stopped after no organism detected on PCR. On arrival to floors patient was delirious and hypertensive. Her home BP meds were added back (lisinopril and coreg) and her insulin regimen was also titrated to control her hyperglycemia. Antibiotics were stopped after consultation with ID for low concern of any ongoing PNA. She was transferred to MPU for co-management with leonardo due to delirium/agitation. Her mental status rapidly improved and she was oriented the morning on 10/6 and not complaining of any symptoms. She was deemed stable for discharge on 10/7 with PCP follow up for HTN, DM2, resolution of encephalitis. She also has instructions to follow up with epilepsy clinic for her keppra management.    Pertinent Physical Exam At Time of Discharge  BP (!) 166/98   Pulse 77   Temp 36.6 °C (97.9 °F)   Resp 18   Ht 1.676 m (5' 5.98\")   Wt 68.6 kg (151 lb 3.8 oz)   SpO2 100%   BMI 24.42 kg/m²     Physical Exam  Constitutional:       General: She is not in acute distress.     Appearance: Normal appearance.   HENT:      Head: Normocephalic and atraumatic.      Mouth/Throat:      Mouth: Mucous membranes are moist.      Pharynx: Oropharynx is clear.   Eyes: "      General: No scleral icterus.     Extraocular Movements: Extraocular movements intact.      Conjunctiva/sclera: Conjunctivae normal.      Pupils: Pupils are equal, round, and reactive to light.   Cardiovascular:      Rate and Rhythm: Normal rate and regular rhythm.      Pulses: Normal pulses.      Heart sounds: Normal heart sounds. No murmur heard.     No friction rub. No gallop.   Pulmonary:      Effort: Pulmonary effort is normal.      Breath sounds: Normal breath sounds. No wheezing, rhonchi or rales.   Abdominal:      General: Abdomen is flat. Bowel sounds are normal. There is no distension.      Palpations: Abdomen is soft.      Tenderness: There is no abdominal tenderness. There is no right CVA tenderness, left CVA tenderness, guarding or rebound.   Musculoskeletal:      Cervical back: Normal range of motion and neck supple.      Right lower leg: No edema.      Left lower leg: No edema.   Skin:     General: Skin is warm and dry.      Findings: No rash.   Neurological:      General: No focal deficit present.      Mental Status: She is alert and oriented to person, place, and time.         Home Medications     Medication List      START taking these medications     carvedilol 12.5 mg tablet; Commonly known as: Coreg; Take 1 tablet (12.5   mg) by mouth 2 times a day.   folic acid 1 mg tablet; Commonly known as: Folvite; Take 1 tablet (1 mg)   by mouth once daily. Do not start before October 7, 2023.   insulin glargine 100 unit/mL injection; Commonly known as: Lantus;   Inject 25 Units under the skin once daily at bedtime. Take as directed per   insulin instructions.   insulin lispro 100 unit/mL injection; Commonly known as: HumaLOG; Inject   0.08 mL (8 Units) under the skin 3 times a day with meals. Take as   directed per insulin instructions. Do not start before October 7, 2023.   levETIRAcetam 750 mg tablet; Commonly known as: Keppra; Take 1 tablet   (750 mg) by mouth 2 times a day.   lisinopril 40 mg  tablet; Take 1 tablet (40 mg) by mouth once daily. Do   not start before October 7, 2023.   multivitamin with minerals tablet; Take 1 tablet by mouth once daily.   QUEtiapine 25 mg tablet; Commonly known as: SEROquel; Take 0.5 tablets   (12.5 mg) by mouth as needed at bedtime (For anxiety or insomnia).       Outpatient Follow-Up  No future appointments.  -Please follow up with PCP within 2 weeks  -Please follow up with epilepsy clinic    Ran Reis MD

## 2023-10-07 NOTE — PROGRESS NOTES
"Valentina Fontanez is a 58 y.o. female on day 6 of admission presenting with Altered mental status.    Subjective   Patient seen in room. Denies new issues at this time, endorses feeling improved. Denies SI/HI/AVH. Slept fairly well for being in hospital. She's a little anxious. She is to read a poem at her brother's  this week.       Objective     MENTAL STATUS EXAM  Appearance: In hospital gown.   Attitude: cooperative   Behavior: good eye contact  Motor Activity: No PMA  Speech: volume/rate/rhythm wnl  Mood: \"pretty good\"  Affect: constricted range but appropriate to mood  Thought Process: Grossly organized  Thought Content:  Denies suicidal ideation, no delusions elicited  Thought Perception: denies visual or auditory hallucinations, did not appear to be responding to hallucinatory stimuli  Insight: Good  Judgement: Good    Last Recorded Vitals  Blood pressure (!) 166/98, pulse 77, temperature 36.6 °C (97.9 °F), resp. rate 18, height 1.676 m (5' 5.98\"), weight 68.6 kg (151 lb 3.8 oz), SpO2 100 %.  Intake/Output last 3 Shifts:  I/O last 3 completed shifts:  In: 1320.1 (19.2 mL/kg) [P.O.:1320; I.V.:0.1 (0 mL/kg)]  Out: - (0 mL/kg)   Weight: 68.6 kg     Relevant Results  Scheduled medications  carvedilol, 12.5 mg, oral, BID  enoxaparin, 40 mg, subcutaneous, Daily  folic acid, 1 mg, oral, Daily  insulin glargine, 20 Units, subcutaneous, Nightly  insulin lispro, 0-15 Units, subcutaneous, TID with meals  insulin lispro, 8 Units, subcutaneous, TID with meals  levETIRAcetam, 750 mg, oral, BID  lisinopril, 40 mg, oral, Daily  multivitamin with minerals, 1 tablet, oral, Daily  potassium, sodium phosphates, 2 packet, oral, Once  thiamine, 100 mg, oral, Daily      Continuous medications     PRN medications  PRN medications: acetaminophen, dextrose 10 % in water (D10W), dextrose, glucagon, hydrALAZINE, OLANZapine     Results for orders placed or performed during the hospital encounter of 10/01/23 (from the past 24 hour(s)) "   POCT GLUCOSE   Result Value Ref Range    POCT Glucose 159 (H) 74 - 99 mg/dL   Renal function panel   Result Value Ref Range    Glucose 258 (H) 74 - 99 mg/dL    Sodium 138 136 - 145 mmol/L    Potassium 4.0 3.5 - 5.3 mmol/L    Chloride 100 98 - 107 mmol/L    Bicarbonate 27 21 - 32 mmol/L    Anion Gap 15 10 - 20 mmol/L    Urea Nitrogen 18 6 - 23 mg/dL    Creatinine 0.76 0.50 - 1.05 mg/dL    eGFR 90 >60 mL/min/1.73m*2    Calcium 9.2 8.6 - 10.6 mg/dL    Phosphorus 3.9 2.5 - 4.9 mg/dL    Albumin 3.0 (L) 3.4 - 5.0 g/dL   Magnesium   Result Value Ref Range    Magnesium 1.82 1.60 - 2.40 mg/dL   CBC   Result Value Ref Range    WBC 10.3 4.4 - 11.3 x10*3/uL    nRBC 0.0 0.0 - 0.0 /100 WBCs    RBC 4.02 4.00 - 5.20 x10*6/uL    Hemoglobin 12.7 12.0 - 16.0 g/dL    Hematocrit 37.2 36.0 - 46.0 %    MCV 93 80 - 100 fL    MCH 31.6 26.0 - 34.0 pg    MCHC 34.1 32.0 - 36.0 g/dL    RDW 11.2 (L) 11.5 - 14.5 %    Platelets 334 150 - 450 x10*3/uL    MPV 11.5 7.5 - 11.5 fL   POCT GLUCOSE   Result Value Ref Range    POCT Glucose 228 (H) 74 - 99 mg/dL   POCT GLUCOSE   Result Value Ref Range    POCT Glucose 236 (H) 74 - 99 mg/dL         Assessment/Plan   Principal Problem:    Altered mental status  Active Problems:    Fever    Abnormal CSF cytology    Aspiration pneumonia of both lower lobes (CMS/HCC)    Epileptiform attacks (CMS/HCC)    HTN (hypertension)    Physical deconditioning    Impression:  Delirium    Recommendations:  Safety/Monitoring:  Patient does not meet criteria for inpatient psychiatric admission  Patient does not require 1:1 observation, given enhanced safety features  Daily interdisciplinary safety and planning huddle with Psychiatry  Video monitoring as with all patients on the MPU  Psychiatry will follow patient daily while on the MPU    Medications:  If QTc < 500ms, recommend olanzapine 2.5mg PO/IM Q8H PRN acute agitation  On discharge, would recommend quetiapine 12.5mg PO at bedtime PRN  anxiety/agitation    Considerations:  Delirium guidelines while in the hospital, especially reorientation and regulation of circadian rhythm    Disposition/Discharge Planning:  SW following, appreciate assistance      Gaurav Vasquez MD

## 2023-10-07 NOTE — CARE PLAN
The patient's goals for the shift include      The clinical goals for the shift include Pt. will maintain a stable Bp through end of shift 10/6 1900.    Over the shift, the patient did not make progress toward the following goals. Barriers to progression include not being notified of abnormal/high blood pressure re. Recommendations to address these barriers include administering medication to decrease elevated blood pressure.

## 2023-10-07 NOTE — PROGRESS NOTES
"Recreation Therapy Note    Therapy Session  Referral Type: New referral this admission  Visit Type: New visit  Session Start Time: 1513  Session End Time: 1520  Intervention Delivery: In-person  Conflict of Service: None  Number of family members present: 0  Family Present for Session: None  Family Participation: None  Number of staff members present: 1    Pre-assessment  Unable to Assess Reason: Outcomes not applicable  Mood/Affect: Anxious, Depressed, Disinterested (grieving recent loss of family member)  Verbalized Emotional State: Grief    Treatment  Areas of Focus: Emotional support, Isolation reduction, Self-expression, Socialization, Coping  Co-Treatment: Other (comment) (none)  Interruption: No  Patient Fell Asleep at End of Session: No    Post-assessment  Unable to Assess Reason: Outcomes not applicable  Mood/Affect: Anxious, Depressed, Disinterested, Distracted, Sad/tearful  Verbalized Emotional State: Grief, Depression  Total Session Time (min): 7 minutes    Narrative  Assessment Detail: Patient was sitting on the edge of her bed having her vitals taken.  Therapist will attempt to engage patient in conversation and activity.  Plan: To engage patient in positive leisure coping skills activity.  Intervention: Offered patient a variety of activiites which patient declined.  Patient preferred to converse.  Evaluation: Patient was discharge focused and stated that she was sad and grieving the recent loss of a family member. Poor eye contact but expressed appreciation for the visit.  Was not intereste in engaging at this time in any positive leisure coping skills exploration.  Follow-up: Will encourage the exploration of safe and effective positive leisure coping skills to manage situational stressors.  Patient Comments: When asked if she would like to do something patient stated \"Just be my friend.\"            "

## 2023-10-07 NOTE — CARE PLAN
The patient's goals for the shift include maintaining safety by using the call light, wearing non skid socks, bed low and locked, and verbalizing all needs to the care team.    The clinical goals for the shift include Pt. will maintain a stable Bp through end of shift 10/6 1900., remaining free from falls, and remaining calm and cooperative w/ POC.    Over the shift, the patient did make progress toward the following goals. Pt remained free from falls and cooperative w/ care. Pt has been able to verbalize needs this shift. Barriers to progression include unfamiliar environment and new medication regimen. Recommendations to address these barriers include re orientation every 1 hr and familiar objects and family at bedside    1730: Pt discharged home w/ family at bedside. IV removed, catheter intact, no tele. Pt has been educated on discharge instructions with  all questions answered. Pt made aware of all charges for meds. No further questions upon discharge.   Lula Dalal RN

## 2023-10-07 NOTE — DISCHARGE INSTRUCTIONS
Hi Ms Fontanez,    You were admitted to the hospital after you had seizure likely due to a viral infection in your brain. You were admitted to the ICU after you needed a breathing tube inserted. You were transferred out of the ICU but you continued to be confused likely due to the infection. Your confusion improved and we feel like it is safe for you to go home. We were also managing your blood pressure and your diabetes and will send you home with medications for this.    It is important that you take your medications as prescribed and follow up with a primary care doctor within 2 weeks. We also provided instructions to call the epilepsy clinic and follow up with them concerning your seizure medication.     Thank you for allowing us to participate in your care,    [facility]

## 2023-10-09 LAB
BACTERIA CSF CULT: NORMAL
GRAM STN SPEC: NORMAL
GRAM STN SPEC: NORMAL

## 2023-10-16 LAB — PATH REVIEW-CELL CT,CSF: NORMAL

## 2023-11-01 ENCOUNTER — APPOINTMENT (OUTPATIENT)
Dept: PRIMARY CARE | Facility: CLINIC | Age: 58
End: 2023-11-01
Payer: COMMERCIAL

## 2023-11-01 ENCOUNTER — PHARMACY VISIT (OUTPATIENT)
Dept: PHARMACY | Facility: CLINIC | Age: 58
End: 2023-11-01
Payer: COMMERCIAL

## 2023-11-01 DIAGNOSIS — Z79.4 TYPE 2 DIABETES MELLITUS WITHOUT COMPLICATION, WITH LONG-TERM CURRENT USE OF INSULIN (MULTI): ICD-10-CM

## 2023-11-01 DIAGNOSIS — E11.9 TYPE 2 DIABETES MELLITUS WITHOUT COMPLICATION, WITH LONG-TERM CURRENT USE OF INSULIN (MULTI): ICD-10-CM

## 2023-11-01 PROCEDURE — RXMED WILLOW AMBULATORY MEDICATION CHARGE

## 2023-11-01 RX ORDER — INSULIN LISPRO 100 [IU]/ML
8 INJECTION, SOLUTION INTRAVENOUS; SUBCUTANEOUS
Qty: 15 ML | Refills: 0 | Status: CANCELLED | OUTPATIENT
Start: 2023-11-01

## 2023-11-02 ENCOUNTER — PHARMACY VISIT (OUTPATIENT)
Dept: PHARMACY | Facility: CLINIC | Age: 58
End: 2023-11-02
Payer: COMMERCIAL

## 2023-11-02 PROCEDURE — RXMED WILLOW AMBULATORY MEDICATION CHARGE

## 2023-12-04 ENCOUNTER — PHARMACY VISIT (OUTPATIENT)
Dept: PHARMACY | Facility: CLINIC | Age: 58
End: 2023-12-04
Payer: COMMERCIAL

## 2023-12-04 PROCEDURE — RXMED WILLOW AMBULATORY MEDICATION CHARGE

## 2023-12-07 PROCEDURE — RXMED WILLOW AMBULATORY MEDICATION CHARGE

## 2023-12-08 ENCOUNTER — PHARMACY VISIT (OUTPATIENT)
Dept: PHARMACY | Facility: CLINIC | Age: 58
End: 2023-12-08
Payer: COMMERCIAL

## 2024-01-02 PROCEDURE — RXMED WILLOW AMBULATORY MEDICATION CHARGE

## 2024-01-04 ENCOUNTER — PHARMACY VISIT (OUTPATIENT)
Dept: PHARMACY | Facility: CLINIC | Age: 59
End: 2024-01-04
Payer: COMMERCIAL

## 2024-01-18 PROCEDURE — RXMED WILLOW AMBULATORY MEDICATION CHARGE

## 2024-01-20 ENCOUNTER — PHARMACY VISIT (OUTPATIENT)
Dept: PHARMACY | Facility: CLINIC | Age: 59
End: 2024-01-20
Payer: COMMERCIAL

## 2024-01-30 ENCOUNTER — PHARMACY VISIT (OUTPATIENT)
Dept: PHARMACY | Facility: CLINIC | Age: 59
End: 2024-01-30
Payer: COMMERCIAL

## 2024-01-30 DIAGNOSIS — I10 HYPERTENSION, UNSPECIFIED TYPE: ICD-10-CM

## 2024-01-30 LAB
CK MB CFR SERPL CALC: NORMAL %
CK MB CFR SERPL CALC: NORMAL %
CK MB SERPL-CCNC: 2.1 NG/ML
CK MB SERPL-CCNC: 4.8 NG/ML

## 2024-01-30 PROCEDURE — RXMED WILLOW AMBULATORY MEDICATION CHARGE

## 2024-01-31 RX ORDER — LISINOPRIL 40 MG/1
40 TABLET ORAL DAILY
Qty: 60 TABLET | Refills: 1 | OUTPATIENT
Start: 2024-01-31

## 2024-01-31 NOTE — TELEPHONE ENCOUNTER
Patient last filled 60 day supply on 1/4/2024. Patient has PCP appt on 2/15/2024, and has enough supply till then. PCP can refill if needed based on vitals/labs

## 2024-02-15 ENCOUNTER — APPOINTMENT (OUTPATIENT)
Dept: PRIMARY CARE | Facility: CLINIC | Age: 59
End: 2024-02-15

## 2024-02-29 DIAGNOSIS — I10 HYPERTENSION, UNSPECIFIED TYPE: ICD-10-CM

## 2024-02-29 DIAGNOSIS — R41.0 DELIRIUM: ICD-10-CM

## 2024-02-29 DIAGNOSIS — R56.9 CONVULSIONS, UNSPECIFIED CONVULSION TYPE (MULTI): ICD-10-CM

## 2024-02-29 PROCEDURE — RXMED WILLOW AMBULATORY MEDICATION CHARGE

## 2024-02-29 RX ORDER — LISINOPRIL 40 MG/1
40 TABLET ORAL DAILY
Qty: 60 TABLET | Refills: 1 | Status: CANCELLED | OUTPATIENT
Start: 2024-02-29

## 2024-02-29 RX ORDER — CARVEDILOL 12.5 MG/1
12.5 TABLET ORAL 2 TIMES DAILY
Qty: 120 TABLET | Refills: 1 | Status: CANCELLED | OUTPATIENT
Start: 2024-02-29

## 2024-02-29 RX ORDER — QUETIAPINE FUMARATE 25 MG/1
12.5 TABLET, FILM COATED ORAL NIGHTLY PRN
Qty: 15 TABLET | Refills: 1 | Status: CANCELLED | OUTPATIENT
Start: 2024-02-29 | End: 2024-04-29

## 2024-03-04 RX ORDER — AMLODIPINE BESYLATE 10 MG/1
10 TABLET ORAL DAILY
COMMUNITY
End: 2024-03-26 | Stop reason: WASHOUT

## 2024-03-04 RX ORDER — CARVEDILOL 3.12 MG/1
3.12 TABLET ORAL
COMMUNITY
Start: 2023-08-21 | End: 2024-03-26 | Stop reason: SDUPTHER

## 2024-03-04 RX ORDER — HYDROCHLOROTHIAZIDE 25 MG/1
1 TABLET ORAL DAILY
COMMUNITY
Start: 2014-01-12 | End: 2024-03-26 | Stop reason: SDUPTHER

## 2024-03-04 RX ORDER — ACETAMINOPHEN 325 MG/1
325-650 TABLET ORAL EVERY 6 HOURS PRN
COMMUNITY
Start: 2017-09-29 | End: 2024-03-26 | Stop reason: WASHOUT

## 2024-03-04 RX ORDER — POTASSIUM CHLORIDE 20 MEQ/1
20 TABLET, EXTENDED RELEASE ORAL
COMMUNITY
Start: 2021-12-29 | End: 2024-03-26 | Stop reason: WASHOUT

## 2024-03-04 RX ORDER — GLIPIZIDE 5 MG/1
TABLET ORAL
COMMUNITY
Start: 2014-01-12 | End: 2024-03-26 | Stop reason: WASHOUT

## 2024-03-04 RX ORDER — METFORMIN HYDROCHLORIDE 1000 MG/1
1000 TABLET ORAL 2 TIMES DAILY
COMMUNITY
End: 2024-03-26 | Stop reason: WASHOUT

## 2024-03-04 RX ORDER — DULAGLUTIDE 0.75 MG/.5ML
0.75 INJECTION, SOLUTION SUBCUTANEOUS WEEKLY
COMMUNITY
Start: 2021-12-30 | End: 2024-03-26 | Stop reason: WASHOUT

## 2024-03-05 ENCOUNTER — PHARMACY VISIT (OUTPATIENT)
Dept: PHARMACY | Facility: CLINIC | Age: 59
End: 2024-03-05
Payer: COMMERCIAL

## 2024-03-05 ENCOUNTER — APPOINTMENT (OUTPATIENT)
Dept: PRIMARY CARE | Facility: CLINIC | Age: 59
End: 2024-03-05
Payer: COMMERCIAL

## 2024-03-06 ENCOUNTER — APPOINTMENT (OUTPATIENT)
Dept: PRIMARY CARE | Facility: CLINIC | Age: 59
End: 2024-03-06
Payer: COMMERCIAL

## 2024-03-26 ENCOUNTER — OFFICE VISIT (OUTPATIENT)
Dept: PRIMARY CARE | Facility: CLINIC | Age: 59
End: 2024-03-26
Payer: COMMERCIAL

## 2024-03-26 VITALS
OXYGEN SATURATION: 98 % | DIASTOLIC BLOOD PRESSURE: 129 MMHG | TEMPERATURE: 96 F | HEART RATE: 89 BPM | SYSTOLIC BLOOD PRESSURE: 212 MMHG | BODY MASS INDEX: 26.16 KG/M2 | WEIGHT: 157 LBS | RESPIRATION RATE: 16 BRPM | HEIGHT: 65 IN

## 2024-03-26 DIAGNOSIS — I16.0 HYPERTENSIVE URGENCY: Primary | ICD-10-CM

## 2024-03-26 DIAGNOSIS — I10 HYPERTENSION, UNSPECIFIED TYPE: ICD-10-CM

## 2024-03-26 DIAGNOSIS — E11.9 TYPE 2 DIABETES MELLITUS WITHOUT COMPLICATION, WITH LONG-TERM CURRENT USE OF INSULIN (MULTI): ICD-10-CM

## 2024-03-26 DIAGNOSIS — Z79.4 TYPE 2 DIABETES MELLITUS WITHOUT COMPLICATION, WITH LONG-TERM CURRENT USE OF INSULIN (MULTI): ICD-10-CM

## 2024-03-26 LAB
ALBUMIN SERPL BCP-MCNC: 4.2 G/DL (ref 3.4–5)
ALP SERPL-CCNC: 73 U/L (ref 33–136)
ALT SERPL W P-5'-P-CCNC: 22 U/L (ref 7–45)
ANION GAP SERPL CALC-SCNC: 18 MMOL/L (ref 10–20)
AST SERPL W P-5'-P-CCNC: 23 U/L (ref 9–39)
BILIRUB SERPL-MCNC: 0.6 MG/DL (ref 0–1.2)
BUN SERPL-MCNC: 14 MG/DL (ref 6–23)
CALCIUM SERPL-MCNC: 10.3 MG/DL (ref 8.6–10.6)
CHLORIDE SERPL-SCNC: 103 MMOL/L (ref 98–107)
CO2 SERPL-SCNC: 25 MMOL/L (ref 21–32)
CREAT SERPL-MCNC: 0.67 MG/DL (ref 0.5–1.05)
CREAT UR-MCNC: 32 MG/DL (ref 20–320)
EGFRCR SERPLBLD CKD-EPI 2021: >90 ML/MIN/1.73M*2
EST. AVERAGE GLUCOSE BLD GHB EST-MCNC: 154 MG/DL
GLUCOSE SERPL-MCNC: 176 MG/DL (ref 74–99)
HBA1C MFR BLD: 7 %
MICROALBUMIN UR-MCNC: 480.7 MG/L
MICROALBUMIN/CREAT UR: 1502.2 UG/MG CREAT
POTASSIUM SERPL-SCNC: 3.5 MMOL/L (ref 3.5–5.3)
PROT SERPL-MCNC: 7.9 G/DL (ref 6.4–8.2)
SODIUM SERPL-SCNC: 142 MMOL/L (ref 136–145)

## 2024-03-26 PROCEDURE — 3080F DIAST BP >= 90 MM HG: CPT | Performed by: STUDENT IN AN ORGANIZED HEALTH CARE EDUCATION/TRAINING PROGRAM

## 2024-03-26 PROCEDURE — 83036 HEMOGLOBIN GLYCOSYLATED A1C: CPT | Performed by: STUDENT IN AN ORGANIZED HEALTH CARE EDUCATION/TRAINING PROGRAM

## 2024-03-26 PROCEDURE — 3051F HG A1C>EQUAL 7.0%<8.0%: CPT | Performed by: STUDENT IN AN ORGANIZED HEALTH CARE EDUCATION/TRAINING PROGRAM

## 2024-03-26 PROCEDURE — 1036F TOBACCO NON-USER: CPT | Performed by: STUDENT IN AN ORGANIZED HEALTH CARE EDUCATION/TRAINING PROGRAM

## 2024-03-26 PROCEDURE — 36415 COLL VENOUS BLD VENIPUNCTURE: CPT | Performed by: STUDENT IN AN ORGANIZED HEALTH CARE EDUCATION/TRAINING PROGRAM

## 2024-03-26 PROCEDURE — 99214 OFFICE O/P EST MOD 30 MIN: CPT | Performed by: STUDENT IN AN ORGANIZED HEALTH CARE EDUCATION/TRAINING PROGRAM

## 2024-03-26 PROCEDURE — 3077F SYST BP >= 140 MM HG: CPT | Performed by: STUDENT IN AN ORGANIZED HEALTH CARE EDUCATION/TRAINING PROGRAM

## 2024-03-26 PROCEDURE — 3062F POS MACROALBUMINURIA REV: CPT | Performed by: STUDENT IN AN ORGANIZED HEALTH CARE EDUCATION/TRAINING PROGRAM

## 2024-03-26 PROCEDURE — 82043 UR ALBUMIN QUANTITATIVE: CPT | Performed by: STUDENT IN AN ORGANIZED HEALTH CARE EDUCATION/TRAINING PROGRAM

## 2024-03-26 PROCEDURE — 80053 COMPREHEN METABOLIC PANEL: CPT | Performed by: STUDENT IN AN ORGANIZED HEALTH CARE EDUCATION/TRAINING PROGRAM

## 2024-03-26 PROCEDURE — 4010F ACE/ARB THERAPY RXD/TAKEN: CPT | Performed by: STUDENT IN AN ORGANIZED HEALTH CARE EDUCATION/TRAINING PROGRAM

## 2024-03-26 RX ORDER — LISINOPRIL 40 MG/1
40 TABLET ORAL DAILY
Qty: 60 TABLET | Refills: 1 | Status: SHIPPED | OUTPATIENT
Start: 2024-03-26

## 2024-03-26 RX ORDER — CARVEDILOL 12.5 MG/1
12.5 TABLET ORAL 2 TIMES DAILY
Qty: 60 TABLET | Refills: 1 | Status: SHIPPED | OUTPATIENT
Start: 2024-03-26 | End: 2024-05-25

## 2024-03-26 RX ORDER — HYDROCHLOROTHIAZIDE 25 MG/1
25 TABLET ORAL DAILY
Qty: 30 TABLET | Refills: 1 | Status: SHIPPED | OUTPATIENT
Start: 2024-03-26 | End: 2024-05-22

## 2024-03-26 RX ORDER — INSULIN GLARGINE 100 [IU]/ML
25 INJECTION, SOLUTION SUBCUTANEOUS NIGHTLY
Qty: 15 ML | Refills: 1 | Status: SHIPPED | OUTPATIENT
Start: 2024-03-26

## 2024-03-26 ASSESSMENT — ENCOUNTER SYMPTOMS
LOSS OF SENSATION IN FEET: 0
SHORTNESS OF BREATH: 0
DEPRESSION: 0
POLYDIPSIA: 0
BLURRED VISION: 0
HYPERTENSION: 1
POLYPHAGIA: 1
OCCASIONAL FEELINGS OF UNSTEADINESS: 0
WEIGHT LOSS: 0

## 2024-03-26 ASSESSMENT — PAIN SCALES - GENERAL: PAINLEVEL: 5

## 2024-03-26 ASSESSMENT — PATIENT HEALTH QUESTIONNAIRE - PHQ9
2. FEELING DOWN, DEPRESSED OR HOPELESS: NOT AT ALL
1. LITTLE INTEREST OR PLEASURE IN DOING THINGS: NOT AT ALL
SUM OF ALL RESPONSES TO PHQ9 QUESTIONS 1 AND 2: 0

## 2024-03-26 NOTE — PROGRESS NOTES
"Subjective   Patient ID: Valentina Fontanez is a 59 y.o. female who presents for New Patient Visit.  Hypertension  This is a chronic problem. The current episode started more than 1 year ago. The problem is uncontrolled. Pertinent negatives include no blurred vision, chest pain or shortness of breath. Risk factors for coronary artery disease include family history and post-menopausal state. Past treatments include beta blockers, ACE inhibitors and diuretics. The current treatment provides mild improvement.   Diabetes  She presents for her follow-up diabetic visit. She has type 2 diabetes mellitus. There are no hypoglycemic associated symptoms. Associated symptoms include polyphagia. Pertinent negatives for diabetes include no blurred vision, no chest pain, no polydipsia and no weight loss. Symptoms are stable. Current diabetic treatment includes insulin injections. She is compliant with treatment most of the time. Her weight is stable. Her overall blood glucose range is 130-140 mg/dl.     Objective     BP (!) 212/129   Pulse 89   Temp 35.6 °C (96 °F)   Resp 16   Ht 1.651 m (5' 5\")   Wt 71.2 kg (157 lb)   SpO2 98%   BMI 26.13 kg/m²     Physical Exam  Vitals reviewed.   Constitutional:       Appearance: She is obese.   Eyes:      Extraocular Movements: Extraocular movements intact.   Cardiovascular:      Rate and Rhythm: Normal rate and regular rhythm.      Heart sounds: Normal heart sounds.   Pulmonary:      Effort: Pulmonary effort is normal. No respiratory distress.      Breath sounds: Normal breath sounds.   Abdominal:      General: Abdomen is flat. Bowel sounds are normal. There is no distension.      Palpations: Abdomen is soft.      Tenderness: There is no abdominal tenderness. There is no guarding.   Musculoskeletal:         General: Normal range of motion.   Skin:     General: Skin is warm and dry.   Neurological:      General: No focal deficit present.      Mental Status: She is alert and oriented to " person, place, and time.       Assessment/Plan   Valentina Fontanez is a 59 y.o. female who presents for concerns below    Diagnoses and all orders for this visit:  Hypertensive urgency  Hypertension, unspecified type  -     hydroCHLOROthiazide (HYDRODiuril) 25 mg tablet; Take 1 tablet (25 mg) by mouth once daily.  -     carvedilol (Coreg) 12.5 mg tablet; Take 1 tablet (12.5 mg) by mouth 2 times a day.  -     lisinopril 40 mg tablet; Take 1 tablet (40 mg) by mouth once daily.  -     Comprehensive metabolic panel; Future  -     Albumin, urine, random; Future  Type 2 diabetes mellitus without complication, with long-term current use of insulin (CMS/[facility])  -     insulin glargine (Lantus) 100 unit/mL (3 mL) pen; Inject 25 Units under the skin once daily at bedtime. Take as directed per insulin instructions.  -     Hemoglobin A1c; Future  -     Comprehensive metabolic panel; Future  -     Albumin, urine, random; Future    #Hypertensive urgency  -Patient reports that she has been out of her medication  -Baseline chart review patient has had multiple elevated blood pressures including while hospitalized with the same medications  -Patient states that her blood pressure on medication at home was in the 160s and that's her usual range  -Discussed with patient that she will need to get evaluated in an acute care setting however patient declined to go at this time.  Informed patient's of concerns however she elected to go to a different time.  Patient did sign AMA form and states that she will seek care at a different time.  -Did order patient's medication for refill and labs for screening at this time-will continue to follow-up with patient    #Type 2 diabetes  -Patient currently is only taking Lantus 25 units daily  -Most recent A1c 11.3, will recheck today  -Further titration and medical management based on repeat A1c    Warren Velasquez MD

## 2024-04-02 RX ORDER — LEVETIRACETAM 750 MG/1
750 TABLET ORAL 2 TIMES DAILY
Qty: 90 TABLET | Refills: 1 | OUTPATIENT
Start: 2024-04-02

## 2024-04-16 DIAGNOSIS — R41.0 DELIRIUM: ICD-10-CM

## 2024-04-17 RX ORDER — FOLIC ACID 1 MG/1
1 TABLET ORAL DAILY
Qty: 60 TABLET | Refills: 2 | OUTPATIENT
Start: 2024-04-17

## 2024-04-17 RX ORDER — MULTIVITAMIN/IRON/FOLIC ACID 18MG-0.4MG
1 TABLET ORAL DAILY
Qty: 60 TABLET | Refills: 2 | OUTPATIENT
Start: 2024-04-17

## 2024-04-18 DIAGNOSIS — R41.0 DELIRIUM: ICD-10-CM

## 2024-04-23 RX ORDER — FOLIC ACID 1 MG/1
1 TABLET ORAL DAILY
Qty: 60 TABLET | Refills: 2 | OUTPATIENT
Start: 2024-04-23

## 2024-04-23 RX ORDER — MULTIVITAMIN/IRON/FOLIC ACID 18MG-0.4MG
1 TABLET ORAL DAILY
Qty: 60 TABLET | Refills: 2 | OUTPATIENT
Start: 2024-04-23

## 2024-04-30 ENCOUNTER — APPOINTMENT (OUTPATIENT)
Dept: PRIMARY CARE | Facility: CLINIC | Age: 59
End: 2024-04-30
Payer: COMMERCIAL

## 2024-05-03 DIAGNOSIS — R41.0 DELIRIUM: ICD-10-CM

## 2024-05-03 RX ORDER — FOLIC ACID 1 MG/1
1 TABLET ORAL DAILY
Qty: 60 TABLET | Refills: 2 | Status: CANCELLED | OUTPATIENT
Start: 2024-05-03

## 2024-05-03 RX ORDER — MULTIVITAMIN/IRON/FOLIC ACID 18MG-0.4MG
1 TABLET ORAL DAILY
Qty: 60 TABLET | Refills: 2 | Status: CANCELLED | OUTPATIENT
Start: 2024-05-03

## 2024-05-17 DIAGNOSIS — I10 HYPERTENSION, UNSPECIFIED TYPE: ICD-10-CM

## 2024-05-22 RX ORDER — HYDROCHLOROTHIAZIDE 25 MG/1
25 TABLET ORAL DAILY
Qty: 30 TABLET | Refills: 1 | Status: SHIPPED | OUTPATIENT
Start: 2024-05-22

## 2024-06-25 ENCOUNTER — OFFICE VISIT (OUTPATIENT)
Dept: PRIMARY CARE | Facility: CLINIC | Age: 59
End: 2024-06-25
Payer: COMMERCIAL

## 2024-06-25 VITALS
HEART RATE: 104 BPM | SYSTOLIC BLOOD PRESSURE: 182 MMHG | RESPIRATION RATE: 18 BRPM | DIASTOLIC BLOOD PRESSURE: 128 MMHG | TEMPERATURE: 96 F | WEIGHT: 150 LBS | HEIGHT: 64 IN | BODY MASS INDEX: 25.61 KG/M2 | OXYGEN SATURATION: 98 %

## 2024-06-25 DIAGNOSIS — I16.0 HYPERTENSIVE URGENCY: Primary | ICD-10-CM

## 2024-06-25 DIAGNOSIS — U09.9 POST-COVID CHRONIC CONCENTRATION DEFICIT: ICD-10-CM

## 2024-06-25 DIAGNOSIS — R41.840 POST-COVID CHRONIC CONCENTRATION DEFICIT: ICD-10-CM

## 2024-06-25 PROCEDURE — 3062F POS MACROALBUMINURIA REV: CPT | Performed by: STUDENT IN AN ORGANIZED HEALTH CARE EDUCATION/TRAINING PROGRAM

## 2024-06-25 PROCEDURE — 99215 OFFICE O/P EST HI 40 MIN: CPT | Performed by: STUDENT IN AN ORGANIZED HEALTH CARE EDUCATION/TRAINING PROGRAM

## 2024-06-25 PROCEDURE — 3080F DIAST BP >= 90 MM HG: CPT | Performed by: STUDENT IN AN ORGANIZED HEALTH CARE EDUCATION/TRAINING PROGRAM

## 2024-06-25 PROCEDURE — 3077F SYST BP >= 140 MM HG: CPT | Performed by: STUDENT IN AN ORGANIZED HEALTH CARE EDUCATION/TRAINING PROGRAM

## 2024-06-25 PROCEDURE — 3051F HG A1C>EQUAL 7.0%<8.0%: CPT | Performed by: STUDENT IN AN ORGANIZED HEALTH CARE EDUCATION/TRAINING PROGRAM

## 2024-06-25 PROCEDURE — 1036F TOBACCO NON-USER: CPT | Performed by: STUDENT IN AN ORGANIZED HEALTH CARE EDUCATION/TRAINING PROGRAM

## 2024-06-25 PROCEDURE — 4010F ACE/ARB THERAPY RXD/TAKEN: CPT | Performed by: STUDENT IN AN ORGANIZED HEALTH CARE EDUCATION/TRAINING PROGRAM

## 2024-06-25 ASSESSMENT — ENCOUNTER SYMPTOMS
DEPRESSION: 0
LOSS OF SENSATION IN FEET: 0
OCCASIONAL FEELINGS OF UNSTEADINESS: 0

## 2024-06-25 ASSESSMENT — PATIENT HEALTH QUESTIONNAIRE - PHQ9
1. LITTLE INTEREST OR PLEASURE IN DOING THINGS: NOT AT ALL
SUM OF ALL RESPONSES TO PHQ9 QUESTIONS 1 AND 2: 1
10. IF YOU CHECKED OFF ANY PROBLEMS, HOW DIFFICULT HAVE THESE PROBLEMS MADE IT FOR YOU TO DO YOUR WORK, TAKE CARE OF THINGS AT HOME, OR GET ALONG WITH OTHER PEOPLE: NOT DIFFICULT AT ALL
2. FEELING DOWN, DEPRESSED OR HOPELESS: SEVERAL DAYS

## 2024-06-25 ASSESSMENT — PAIN SCALES - GENERAL: PAINLEVEL: 10-WORST PAIN EVER

## 2024-06-25 NOTE — PROGRESS NOTES
"Subjective   Patient ID: Valentina Fontanez is a 59 y.o. female who presents for Hypertension.  Hypertensive Urgency  - Patient presented with elevated blood pressure  - States that she has been taking the medication she has at home  - She is currently out of her hydrochlorothiazide for past ~5 days  - Does not check her Bps at home, concerned about feeling down after seeing elevated pressures  - Currently taking Lisinopril and Coreg  - Endorses abdominal pain on right side radiating to the back and nausea  - Pain woke her up last night, took two tylenol this am with some relief  - Denies any headache, changes in vision, chest pain, vomiting    Post Covid symptoms  - Continues to have episodes of confusion and forgetfulness  - COVID positive Oct 1, 2023  - Unsure she will be able to perform at jury duty  - Requesting letter for jury duty      Objective     BP (!) 182/128 (BP Location: Right arm, Patient Position: Sitting, BP Cuff Size: Adult)   Pulse 104   Temp 35.6 °C (96 °F) (Skin)   Resp 18   Ht 1.626 m (5' 4\")   Wt 68 kg (150 lb)   LMP  (LMP Unknown)   SpO2 98%   BMI 25.75 kg/m²   BP#1: 202/134,   BP#2 182/128,     Physical Exam  Vitals reviewed.   Eyes:      Extraocular Movements: Extraocular movements intact.   Cardiovascular:      Rate and Rhythm: Regular rhythm. Tachycardia present.      Heart sounds: Normal heart sounds. No murmur heard.  Pulmonary:      Effort: Pulmonary effort is normal. No respiratory distress.      Breath sounds: Normal breath sounds.   Abdominal:      General: Abdomen is flat. Bowel sounds are normal. There is no distension.      Palpations: Abdomen is soft.      Tenderness: There is no abdominal tenderness. There is no guarding.   Musculoskeletal:         General: Normal range of motion.   Skin:     General: Skin is warm and dry.   Neurological:      General: No focal deficit present.      Mental Status: She is alert and oriented to person, place, and time. "       Assessment/Plan   Valentina Fontanez is a 59 y.o. female who presents for concerns below    Diagnoses and all orders for this visit:  Hypertensive urgency  Post-COVID chronic concentration deficit    Hypertensive urgency  -Continue with current medication regimen  -Discussed with patient given recent acute abdominal pain and hypertensive urgency to seek evaluation in emergency department for which she is agreeable  -Continue with current medication regimen, consider increasing carvedilol to 25 mg twice daily for improved blood pressure and heart rate control  -Further medication titration following ED visit    Post COVID concentration concerns  -Patient provided with letter for jury duty to excuse her    Warren Velasquez MD

## 2024-06-25 NOTE — LETTER
Valentina Fontanez  1965 6/25/2024    To Whom This May Concern:    My patient, Valentina Fontanez, is unable to perform Jury Duty due to a mental or physical condition that renders the prospective juror unfit for jury service for the remainder of the jury year.    Should you have any questions please do not hesitate to call.    Thank you for your cooperation.    Sincerely,    Warren Velasquez MD

## 2024-07-12 ENCOUNTER — PATIENT MESSAGE (OUTPATIENT)
Dept: PRIMARY CARE | Facility: CLINIC | Age: 59
End: 2024-07-12

## 2024-07-16 DIAGNOSIS — Z79.4 TYPE 2 DIABETES MELLITUS WITHOUT COMPLICATION, WITH LONG-TERM CURRENT USE OF INSULIN (MULTI): ICD-10-CM

## 2024-07-16 DIAGNOSIS — E11.9 TYPE 2 DIABETES MELLITUS WITHOUT COMPLICATION, WITH LONG-TERM CURRENT USE OF INSULIN (MULTI): ICD-10-CM

## 2024-07-16 RX ORDER — INSULIN GLARGINE 100 [IU]/ML
25 INJECTION, SOLUTION SUBCUTANEOUS NIGHTLY
Qty: 15 ML | Refills: 4 | Status: SHIPPED | OUTPATIENT
Start: 2024-07-16 | End: 2025-07-16

## 2024-08-09 DIAGNOSIS — I10 HYPERTENSION, UNSPECIFIED TYPE: ICD-10-CM

## 2024-08-13 RX ORDER — LISINOPRIL 40 MG/1
40 TABLET ORAL DAILY
Qty: 60 TABLET | Refills: 0 | Status: SHIPPED | OUTPATIENT
Start: 2024-08-13

## 2024-08-21 ENCOUNTER — PATIENT MESSAGE (OUTPATIENT)
Dept: PRIMARY CARE | Facility: CLINIC | Age: 59
End: 2024-08-21
Payer: COMMERCIAL

## 2024-08-21 DIAGNOSIS — I10 HYPERTENSION, UNSPECIFIED TYPE: ICD-10-CM

## 2024-09-03 RX ORDER — LISINOPRIL 40 MG/1
40 TABLET ORAL DAILY
Qty: 30 TABLET | Refills: 1 | Status: SHIPPED | OUTPATIENT
Start: 2024-09-03 | End: 2024-11-02

## 2024-09-12 ENCOUNTER — PATIENT MESSAGE (OUTPATIENT)
Dept: PRIMARY CARE | Facility: CLINIC | Age: 59
End: 2024-09-12
Payer: COMMERCIAL

## 2024-09-12 DIAGNOSIS — I10 HYPERTENSION, UNSPECIFIED TYPE: ICD-10-CM

## 2024-09-17 RX ORDER — CARVEDILOL 12.5 MG/1
12.5 TABLET ORAL 2 TIMES DAILY
Qty: 60 TABLET | Refills: 1 | Status: SHIPPED | OUTPATIENT
Start: 2024-09-17 | End: 2024-11-16

## 2024-09-17 RX ORDER — LISINOPRIL 40 MG/1
40 TABLET ORAL DAILY
Qty: 30 TABLET | Refills: 1 | Status: SHIPPED | OUTPATIENT
Start: 2024-09-17 | End: 2024-11-16

## 2024-09-21 ENCOUNTER — APPOINTMENT (OUTPATIENT)
Dept: RADIOLOGY | Facility: HOSPITAL | Age: 59
End: 2024-09-21
Payer: COMMERCIAL

## 2024-09-21 ENCOUNTER — CLINICAL SUPPORT (OUTPATIENT)
Dept: EMERGENCY MEDICINE | Facility: HOSPITAL | Age: 59
End: 2024-09-21
Payer: COMMERCIAL

## 2024-09-21 ENCOUNTER — HOSPITAL ENCOUNTER (INPATIENT)
Facility: HOSPITAL | Age: 59
End: 2024-09-21
Attending: EMERGENCY MEDICINE | Admitting: PSYCHIATRY & NEUROLOGY
Payer: COMMERCIAL

## 2024-09-21 DIAGNOSIS — I63.81 LACUNAR INFARCT, ACUTE (MULTI): Primary | ICD-10-CM

## 2024-09-21 DIAGNOSIS — I63.9 CEREBROVASCULAR ACCIDENT (CVA), UNSPECIFIED MECHANISM (MULTI): ICD-10-CM

## 2024-09-21 LAB
ALBUMIN SERPL BCP-MCNC: 4.3 G/DL (ref 3.4–5)
ALP SERPL-CCNC: 101 U/L (ref 33–136)
ALT SERPL W P-5'-P-CCNC: 19 U/L (ref 7–45)
AMPHETAMINES UR QL SCN: ABNORMAL
ANION GAP BLDV CALCULATED.4IONS-SCNC: 5 MMOL/L (ref 10–25)
ANION GAP SERPL CALC-SCNC: 16 MMOL/L (ref 10–20)
APPEARANCE UR: CLEAR
APTT PPP: 30 SECONDS (ref 27–38)
AST SERPL W P-5'-P-CCNC: 31 U/L (ref 9–39)
ATRIAL RATE: 92 BPM
BARBITURATES UR QL SCN: ABNORMAL
BASE EXCESS BLDV CALC-SCNC: 9.4 MMOL/L (ref -2–3)
BASOPHILS # BLD AUTO: 0.03 X10*3/UL (ref 0–0.1)
BASOPHILS NFR BLD AUTO: 0.3 %
BENZODIAZ UR QL SCN: ABNORMAL
BILIRUB SERPL-MCNC: 0.8 MG/DL (ref 0–1.2)
BILIRUB UR STRIP.AUTO-MCNC: NEGATIVE MG/DL
BNP SERPL-MCNC: 30 PG/ML (ref 0–99)
BODY TEMPERATURE: 37 DEGREES CELSIUS
BUN SERPL-MCNC: 11 MG/DL (ref 6–23)
BZE UR QL SCN: ABNORMAL
CA-I BLDV-SCNC: 1.16 MMOL/L (ref 1.1–1.33)
CALCIUM SERPL-MCNC: 9.7 MG/DL (ref 8.6–10.6)
CANNABINOIDS UR QL SCN: ABNORMAL
CARDIAC TROPONIN I PNL SERPL HS: 22 NG/L (ref 0–34)
CHLORIDE BLDV-SCNC: 106 MMOL/L (ref 98–107)
CHLORIDE SERPL-SCNC: 102 MMOL/L (ref 98–107)
CHOLEST SERPL-MCNC: 252 MG/DL (ref 0–199)
CHOLESTEROL/HDL RATIO: 5.1
CO2 SERPL-SCNC: 25 MMOL/L (ref 21–32)
COLOR UR: ABNORMAL
CREAT SERPL-MCNC: 0.75 MG/DL (ref 0.5–1.05)
EGFRCR SERPLBLD CKD-EPI 2021: >90 ML/MIN/1.73M*2
EOSINOPHIL # BLD AUTO: 0.06 X10*3/UL (ref 0–0.7)
EOSINOPHIL NFR BLD AUTO: 0.5 %
ERYTHROCYTE [DISTWIDTH] IN BLOOD BY AUTOMATED COUNT: 12.2 % (ref 11.5–14.5)
EST. AVERAGE GLUCOSE BLD GHB EST-MCNC: 186 MG/DL
FENTANYL+NORFENTANYL UR QL SCN: ABNORMAL
GLUCOSE BLD MANUAL STRIP-MCNC: 193 MG/DL (ref 74–99)
GLUCOSE BLD MANUAL STRIP-MCNC: 233 MG/DL (ref 74–99)
GLUCOSE BLDV-MCNC: 256 MG/DL (ref 74–99)
GLUCOSE SERPL-MCNC: 225 MG/DL (ref 74–99)
GLUCOSE UR STRIP.AUTO-MCNC: ABNORMAL MG/DL
HBA1C MFR BLD: 8.1 %
HCO3 BLDV-SCNC: 34.3 MMOL/L (ref 22–26)
HCT VFR BLD AUTO: 39.3 % (ref 36–46)
HCT VFR BLD EST: 43 % (ref 36–46)
HDLC SERPL-MCNC: 49.3 MG/DL
HGB BLD-MCNC: 13.8 G/DL (ref 12–16)
HGB BLDV-MCNC: 14.4 G/DL (ref 12–16)
HOLD SPECIMEN: NORMAL
IMM GRANULOCYTES # BLD AUTO: 0.02 X10*3/UL (ref 0–0.7)
IMM GRANULOCYTES NFR BLD AUTO: 0.2 % (ref 0–0.9)
INR PPP: 1.1 (ref 0.9–1.1)
KETONES UR STRIP.AUTO-MCNC: NEGATIVE MG/DL
LACTATE BLDV-SCNC: 3.1 MMOL/L (ref 0.4–2)
LACTATE BLDV-SCNC: 3.1 MMOL/L (ref 0.4–2)
LDLC SERPL CALC-MCNC: ABNORMAL MG/DL
LEUKOCYTE ESTERASE UR QL STRIP.AUTO: NEGATIVE
LYMPHOCYTES # BLD AUTO: 2.84 X10*3/UL (ref 1.2–4.8)
LYMPHOCYTES NFR BLD AUTO: 25.5 %
MCH RBC QN AUTO: 29.7 PG (ref 26–34)
MCHC RBC AUTO-ENTMCNC: 35.1 G/DL (ref 32–36)
MCV RBC AUTO: 85 FL (ref 80–100)
METHADONE UR QL SCN: ABNORMAL
MONOCYTES # BLD AUTO: 0.75 X10*3/UL (ref 0.1–1)
MONOCYTES NFR BLD AUTO: 6.7 %
NEUTROPHILS # BLD AUTO: 7.42 X10*3/UL (ref 1.2–7.7)
NEUTROPHILS NFR BLD AUTO: 66.8 %
NITRITE UR QL STRIP.AUTO: NEGATIVE
NON HDL CHOLESTEROL: 203 MG/DL (ref 0–149)
NRBC BLD-RTO: 0 /100 WBCS (ref 0–0)
OPIATES UR QL SCN: ABNORMAL
OXYCODONE+OXYMORPHONE UR QL SCN: ABNORMAL
OXYHGB MFR BLDV: 60.6 % (ref 45–75)
P AXIS: 43 DEGREES
P OFFSET: 206 MS
P ONSET: 158 MS
PCO2 BLDV: 46 MM HG (ref 41–51)
PCP UR QL SCN: ABNORMAL
PH BLDV: 7.48 PH (ref 7.33–7.43)
PH UR STRIP.AUTO: 7 [PH]
PLATELET # BLD AUTO: 296 X10*3/UL (ref 150–450)
PO2 BLDV: 38 MM HG (ref 35–45)
POTASSIUM BLDV-SCNC: 3.6 MMOL/L (ref 3.5–5.3)
POTASSIUM SERPL-SCNC: 3.3 MMOL/L (ref 3.5–5.3)
PR INTERVAL: 126 MS
PROT SERPL-MCNC: 8 G/DL (ref 6.4–8.2)
PROT UR STRIP.AUTO-MCNC: ABNORMAL MG/DL
PROTHROMBIN TIME: 12.2 SECONDS (ref 9.8–12.8)
Q ONSET: 221 MS
QRS COUNT: 15 BEATS
QRS DURATION: 78 MS
QT INTERVAL: 370 MS
QTC CALCULATION(BAZETT): 457 MS
QTC FREDERICIA: 426 MS
R AXIS: -4 DEGREES
RBC # BLD AUTO: 4.65 X10*6/UL (ref 4–5.2)
RBC # UR STRIP.AUTO: NEGATIVE /UL
RBC #/AREA URNS AUTO: NORMAL /HPF
SAO2 % BLDV: 62 % (ref 45–75)
SODIUM BLDV-SCNC: 142 MMOL/L (ref 136–145)
SODIUM SERPL-SCNC: 140 MMOL/L (ref 136–145)
SP GR UR STRIP.AUTO: >1.05
SQUAMOUS #/AREA URNS AUTO: NORMAL /HPF
T AXIS: 102 DEGREES
T OFFSET: 406 MS
TRIGL SERPL-MCNC: 498 MG/DL (ref 0–149)
UROBILINOGEN UR STRIP.AUTO-MCNC: NORMAL MG/DL
VENTRICULAR RATE: 92 BPM
VLDL: ABNORMAL
WBC # BLD AUTO: 11.1 X10*3/UL (ref 4.4–11.3)
WBC #/AREA URNS AUTO: NORMAL /HPF

## 2024-09-21 PROCEDURE — 71046 X-RAY EXAM CHEST 2 VIEWS: CPT | Performed by: STUDENT IN AN ORGANIZED HEALTH CARE EDUCATION/TRAINING PROGRAM

## 2024-09-21 PROCEDURE — 85730 THROMBOPLASTIN TIME PARTIAL: CPT

## 2024-09-21 PROCEDURE — 1200000002 HC GENERAL ROOM WITH TELEMETRY DAILY

## 2024-09-21 PROCEDURE — 36415 COLL VENOUS BLD VENIPUNCTURE: CPT

## 2024-09-21 PROCEDURE — 99285 EMERGENCY DEPT VISIT HI MDM: CPT

## 2024-09-21 PROCEDURE — 82947 ASSAY GLUCOSE BLOOD QUANT: CPT

## 2024-09-21 PROCEDURE — 2500000004 HC RX 250 GENERAL PHARMACY W/ HCPCS (ALT 636 FOR OP/ED)

## 2024-09-21 PROCEDURE — 93005 ELECTROCARDIOGRAM TRACING: CPT

## 2024-09-21 PROCEDURE — 70551 MRI BRAIN STEM W/O DYE: CPT | Performed by: RADIOLOGY

## 2024-09-21 PROCEDURE — 83605 ASSAY OF LACTIC ACID: CPT

## 2024-09-21 PROCEDURE — 99223 1ST HOSP IP/OBS HIGH 75: CPT

## 2024-09-21 PROCEDURE — 70498 CT ANGIOGRAPHY NECK: CPT

## 2024-09-21 PROCEDURE — 83036 HEMOGLOBIN GLYCOSYLATED A1C: CPT

## 2024-09-21 PROCEDURE — 96374 THER/PROPH/DIAG INJ IV PUSH: CPT

## 2024-09-21 PROCEDURE — 84132 ASSAY OF SERUM POTASSIUM: CPT

## 2024-09-21 PROCEDURE — 70496 CT ANGIOGRAPHY HEAD: CPT | Performed by: RADIOLOGY

## 2024-09-21 PROCEDURE — 85025 COMPLETE CBC W/AUTO DIFF WBC: CPT

## 2024-09-21 PROCEDURE — 70450 CT HEAD/BRAIN W/O DYE: CPT

## 2024-09-21 PROCEDURE — 2500000002 HC RX 250 W HCPCS SELF ADMINISTERED DRUGS (ALT 637 FOR MEDICARE OP, ALT 636 FOR OP/ED)

## 2024-09-21 PROCEDURE — 93010 ELECTROCARDIOGRAM REPORT: CPT | Performed by: EMERGENCY MEDICINE

## 2024-09-21 PROCEDURE — 99285 EMERGENCY DEPT VISIT HI MDM: CPT | Performed by: EMERGENCY MEDICINE

## 2024-09-21 PROCEDURE — 80061 LIPID PANEL: CPT

## 2024-09-21 PROCEDURE — 84484 ASSAY OF TROPONIN QUANT: CPT

## 2024-09-21 PROCEDURE — 83880 ASSAY OF NATRIURETIC PEPTIDE: CPT

## 2024-09-21 PROCEDURE — 96361 HYDRATE IV INFUSION ADD-ON: CPT

## 2024-09-21 PROCEDURE — 70551 MRI BRAIN STEM W/O DYE: CPT

## 2024-09-21 PROCEDURE — 2500000001 HC RX 250 WO HCPCS SELF ADMINISTERED DRUGS (ALT 637 FOR MEDICARE OP)

## 2024-09-21 PROCEDURE — 70498 CT ANGIOGRAPHY NECK: CPT | Performed by: RADIOLOGY

## 2024-09-21 PROCEDURE — 80307 DRUG TEST PRSMV CHEM ANLYZR: CPT

## 2024-09-21 PROCEDURE — 81003 URINALYSIS AUTO W/O SCOPE: CPT

## 2024-09-21 PROCEDURE — 85610 PROTHROMBIN TIME: CPT

## 2024-09-21 PROCEDURE — 2550000001 HC RX 255 CONTRASTS

## 2024-09-21 PROCEDURE — 71046 X-RAY EXAM CHEST 2 VIEWS: CPT

## 2024-09-21 RX ORDER — DEXTROSE 50 % IN WATER (D50W) INTRAVENOUS SYRINGE
25
Status: DISCONTINUED | OUTPATIENT
Start: 2024-09-21 | End: 2024-09-21

## 2024-09-21 RX ORDER — NAPROXEN SODIUM 220 MG/1
81 TABLET, FILM COATED ORAL DAILY
Status: DISCONTINUED | OUTPATIENT
Start: 2024-09-21 | End: 2024-09-27 | Stop reason: HOSPADM

## 2024-09-21 RX ORDER — LISINOPRIL 20 MG/1
40 TABLET ORAL DAILY
Status: DISCONTINUED | OUTPATIENT
Start: 2024-09-21 | End: 2024-09-23

## 2024-09-21 RX ORDER — DEXTROSE 50 % IN WATER (D50W) INTRAVENOUS SYRINGE
12.5
Status: DISCONTINUED | OUTPATIENT
Start: 2024-09-21 | End: 2024-09-21

## 2024-09-21 RX ORDER — POLYETHYLENE GLYCOL 3350 17 G/17G
17 POWDER, FOR SOLUTION ORAL DAILY
Status: DISCONTINUED | OUTPATIENT
Start: 2024-09-21 | End: 2024-09-25

## 2024-09-21 RX ORDER — CARVEDILOL 12.5 MG/1
12.5 TABLET ORAL 2 TIMES DAILY
Status: DISCONTINUED | OUTPATIENT
Start: 2024-09-21 | End: 2024-09-25

## 2024-09-21 RX ORDER — ICOSAPENT ETHYL 1 G/1
2 CAPSULE ORAL
Status: DISCONTINUED | OUTPATIENT
Start: 2024-09-22 | End: 2024-09-27 | Stop reason: HOSPADM

## 2024-09-21 RX ORDER — LABETALOL HYDROCHLORIDE 5 MG/ML
10 INJECTION, SOLUTION INTRAVENOUS EVERY 10 MIN PRN
Status: DISCONTINUED | OUTPATIENT
Start: 2024-09-21 | End: 2024-09-23

## 2024-09-21 RX ORDER — NAPROXEN SODIUM 220 MG/1
81 TABLET, FILM COATED ORAL DAILY
Status: DISCONTINUED | OUTPATIENT
Start: 2024-09-22 | End: 2024-09-21

## 2024-09-21 RX ORDER — DEXTROSE 50 % IN WATER (D50W) INTRAVENOUS SYRINGE
25
Status: DISCONTINUED | OUTPATIENT
Start: 2024-09-21 | End: 2024-09-27 | Stop reason: HOSPADM

## 2024-09-21 RX ORDER — LABETALOL HYDROCHLORIDE 5 MG/ML
INJECTION, SOLUTION INTRAVENOUS
Status: COMPLETED
Start: 2024-09-21 | End: 2024-09-21

## 2024-09-21 RX ORDER — INSULIN GLARGINE 100 [IU]/ML
15 INJECTION, SOLUTION SUBCUTANEOUS NIGHTLY
Status: DISCONTINUED | OUTPATIENT
Start: 2024-09-21 | End: 2024-09-25

## 2024-09-21 RX ORDER — ENOXAPARIN SODIUM 100 MG/ML
40 INJECTION SUBCUTANEOUS EVERY 24 HOURS
Status: DISCONTINUED | OUTPATIENT
Start: 2024-09-21 | End: 2024-09-27 | Stop reason: HOSPADM

## 2024-09-21 RX ORDER — DEXTROSE 50 % IN WATER (D50W) INTRAVENOUS SYRINGE
12.5
Status: DISCONTINUED | OUTPATIENT
Start: 2024-09-21 | End: 2024-09-27 | Stop reason: HOSPADM

## 2024-09-21 RX ORDER — HYDROCHLOROTHIAZIDE 25 MG/1
25 TABLET ORAL DAILY
Status: DISCONTINUED | OUTPATIENT
Start: 2024-09-21 | End: 2024-09-23

## 2024-09-21 RX ORDER — INSULIN LISPRO 100 [IU]/ML
0-5 INJECTION, SOLUTION INTRAVENOUS; SUBCUTANEOUS
Status: DISCONTINUED | OUTPATIENT
Start: 2024-09-21 | End: 2024-09-27 | Stop reason: HOSPADM

## 2024-09-21 RX ORDER — HYDRALAZINE HYDROCHLORIDE 20 MG/ML
10 INJECTION INTRAMUSCULAR; INTRAVENOUS
Status: DISCONTINUED | OUTPATIENT
Start: 2024-09-21 | End: 2024-09-23

## 2024-09-21 RX ORDER — ATORVASTATIN CALCIUM 20 MG/1
40 TABLET, FILM COATED ORAL NIGHTLY
Status: DISCONTINUED | OUTPATIENT
Start: 2024-09-21 | End: 2024-09-22

## 2024-09-21 RX ORDER — HYDRALAZINE HYDROCHLORIDE 25 MG/1
25 TABLET, FILM COATED ORAL EVERY 6 HOURS PRN
Status: DISCONTINUED | OUTPATIENT
Start: 2024-09-23 | End: 2024-09-23

## 2024-09-21 RX ORDER — LABETALOL HYDROCHLORIDE 5 MG/ML
20 INJECTION, SOLUTION INTRAVENOUS ONCE
Status: COMPLETED | OUTPATIENT
Start: 2024-09-21 | End: 2024-09-21

## 2024-09-21 RX ADMIN — LABETALOL HYDROCHLORIDE 20 MG: 5 INJECTION, SOLUTION INTRAVENOUS at 12:21

## 2024-09-21 RX ADMIN — ENOXAPARIN SODIUM 40 MG: 40 INJECTION SUBCUTANEOUS at 15:57

## 2024-09-21 RX ADMIN — INSULIN LISPRO 1 UNITS: 100 INJECTION, SOLUTION INTRAVENOUS; SUBCUTANEOUS at 18:37

## 2024-09-21 RX ADMIN — SODIUM CHLORIDE, POTASSIUM CHLORIDE, SODIUM LACTATE AND CALCIUM CHLORIDE 1000 ML: 600; 310; 30; 20 INJECTION, SOLUTION INTRAVENOUS at 12:18

## 2024-09-21 RX ADMIN — ASPIRIN 81 MG 81 MG: 81 TABLET ORAL at 15:56

## 2024-09-21 RX ADMIN — INSULIN GLARGINE 15 UNITS: 100 INJECTION, SOLUTION SUBCUTANEOUS at 21:03

## 2024-09-21 RX ADMIN — ATORVASTATIN CALCIUM 40 MG: 20 TABLET, FILM COATED ORAL at 21:03

## 2024-09-21 RX ADMIN — IOHEXOL 90 ML: 350 INJECTION, SOLUTION INTRAVENOUS at 12:03

## 2024-09-21 ASSESSMENT — PAIN - FUNCTIONAL ASSESSMENT: PAIN_FUNCTIONAL_ASSESSMENT: 0-10

## 2024-09-21 ASSESSMENT — PAIN DESCRIPTION - PROGRESSION: CLINICAL_PROGRESSION: NOT CHANGED

## 2024-09-21 ASSESSMENT — LIFESTYLE VARIABLES
EVER FELT BAD OR GUILTY ABOUT YOUR DRINKING: NO
TOTAL SCORE: 0
HAVE PEOPLE ANNOYED YOU BY CRITICIZING YOUR DRINKING: NO
EVER HAD A DRINK FIRST THING IN THE MORNING TO STEADY YOUR NERVES TO GET RID OF A HANGOVER: NO
HAVE YOU EVER FELT YOU SHOULD CUT DOWN ON YOUR DRINKING: NO

## 2024-09-21 ASSESSMENT — PAIN SCALES - GENERAL: PAINLEVEL_OUTOF10: 0 - NO PAIN

## 2024-09-21 ASSESSMENT — VISUAL ACUITY: METHOD_CF: 1

## 2024-09-21 ASSESSMENT — COLUMBIA-SUICIDE SEVERITY RATING SCALE - C-SSRS: 6. HAVE YOU EVER DONE ANYTHING, STARTED TO DO ANYTHING, OR PREPARED TO DO ANYTHING TO END YOUR LIFE?: NO

## 2024-09-21 NOTE — PROGRESS NOTES
Pharmacy Medication History Review    Valentina Fontanez is a 59 y.o. female admitted for No Principal Problem: There is no principal problem currently on the Problem List. Please update the Problem List and refresh.. Pharmacy reviewed the patient's [id number] medications and allergies for accuracy.    The list below reflectives the updated PTA list. Please review each medication in order reconciliation for additional clarification and justification.  Prior to Admission Medications   Prescriptions Last Dose Informant Patient Reported? Taking?   carvedilol (Coreg) 12.5 mg tablet More than a month  No No   Sig: Take 1 tablet (12.5 mg) by mouth 2 times a day.   Patient not taking: Reported on 9/21/2024   folic acid (Folvite) 1 mg tablet More than a month  No No   Sig: Take 1 tablet (1 mg) by mouth once daily. Do not start before October 7, 2023.   Patient not taking: Reported on 9/21/2024   hydroCHLOROthiazide (HYDRODiuril) 25 mg tablet More than a month  No No   Sig: TAKE 1 TABLET(25 MG) BY MOUTH DAILY   Patient not taking: Reported on 9/21/2024   insulin glargine (Lantus) 100 unit/mL (3 mL) pen 9/18/2024  No No   Sig: Inject 25 Units under the skin once daily at bedtime. Take as directed per insulin instructions.   insulin lispro (Admelog SoloStar U-100 Insulin) 100 unit/mL injection More than a month  No No   Sig: Inject 8 Units under the skin 3 times a day with meals. Take as directed per insulin instructions.   Patient not taking: Reported on 9/21/2024   lisinopril 40 mg tablet 9/20/2024  No Yes   Sig: Take 1 tablet (40 mg) by mouth once daily.   multivitamin with minerals tablet 9/20/2024  No Yes   Sig: Take 1 tablet by mouth once daily.      Facility-Administered Medications: None         The list below reflectives the updated allergy list. Please review each documented allergy for additional clarification and justification.  Allergies  Reviewed by Reba Blevins, PharmD on 9/21/2024        Severity  Reactions Comments    Tetanus Toxoid High Swelling     Gemfibrozil Medium Cough, GI Upset     Morphine Low Rash             Below are additional concerns with the patient's PTA list.  Reviewed medications with patient who could tell me what medications she is currently taking but was unsure if she was to continue taking carvedilol and hydrochlorothiazide.    Also reviewed pharmacy fill history, office notes and [place]ARRS. Of note, she reports to no longer be taking carvedilol and hydrochlorothiazide (both last filled 4/2024) with last doses reported 3-4 months ago. Per office note on 6/25/24 she was to continue taking carvedilol, hydrochlorothiazide and lisinopril. Recommend refilling prescriptions for patient at discharge as well as counseling on the importance of taking this prescriptions.    Patient also requests folic acid refill at discharge.    Changes are as follows:    Removed:  -quetiapine- Rx never renewed and patient states to no longer use  -insulin lispro- patient hasn't taken in months, last A1c was 7.0 and PCP note states patient is just on lantus    Reba Blevins, ArielD, BCCCP

## 2024-09-21 NOTE — ED PROVIDER NOTES
History of Present Illness     History provided by: Patient and EMS  Limitations to History: None    HPI:  Valentina Fontanez is a 59 y.o. female with history of acute viral meningitis and generalized tonic-clonic seizures, presenting the emergency department today for strokelike symptoms.  Patient reports she woke up around 4:00 AM this morning and felt significant weakness to her right upper and lower extremity as well as facial droop. Went back to bed to try to sleep off the symptoms however, couple hours later the symptoms continued so she called EMS to her house.  On arrival, she was noted to have significant weakness to the right upper and lower extremity as well as right-sided facial droop was brought to the hospital today for stroke evaluation.  On arrival, she is having difficulty getting words out.  Reports has been feeling otherwise well in the past couple days.  Denies fevers, chills, chest pain, shortness of breath, abdominal pain, or changes in urination/stool for us today.    Looking back at patient's chart, she presented last year with seizure-like activity that was generalized tonic-clonic in nature was placed on Keppra at that time.  This was thought to be secondary to viral encephalitis/meningitis.    Last Known Well Time: 9:00pm    Physical Exam   Triage vitals:  T 37.1 °C (98.8 °F)  HR (!) 101  BP (!) 228/118  RR 18  O2 95 % None (Room air)    General: Awake, alert, in no acute distress  Eyes: Gaze conjugate.  No scleral icterus or injection  HENT: Normo-cephalic, atraumatic. No stridor.  CV: Regular rate, regular rhythm. Radial pulses 2+ bilaterally  Resp: Breathing non-labored, speaking in full sentences.  Clear to auscultation bilaterally  GI: Soft, non-distended, non-tender. No rebound or guarding.  MSK/Extremities: No gross bony deformities. Moving all extremities  Skin: Warm. Appropriate color  Neuro: see below for NIHSS  Psych: Appropriate mood and affect    NIH Stroke Scale  Time:  12:00  Person Administering Scale: Stephan Grady MD    1a  Level of consciousness: 0=alert; keenly responsive   1b. LOC questions:  0=Performs both tasks correctly   1c. LOC commands: 0=Performs both tasks correctly   2.  Best Gaze: 0=normal   3. Visual: 0=No visual loss   4. Facial Palsy: 2=Partial paralysis (total or near total paralysis of the lower face)   5a. Motor left arm: 0=No drift, limb holds 90 (or 45) degrees for full 10 seconds   5b.  Motor right arm: 3=No effort against gravity, limb falls   6a. Motor left le=No drift, limb holds 90 (or 45) degrees for full 10 seconds   6b  Motor right leg:  3=No effort against gravity, limb falls   7. Limb Ataxia: 0=Absent   8.  Sensory: 0=Normal; no sensory loss   9. Best Language:  0=No aphasia, normal   10. Dysarthria: 1=Mild to moderate, patient slurs at least some words and at worst, can be understood with some difficulty   11. Extinction and Inattention: 0=No abnormality     Total:   9     VAN: Positive    Medical Decision Making & ED Course   Medical Decision Makin y.o. female presented emergency department today for strokelike symptoms.  On arrival, notably hypertensive to 228/118, heart rate 101.  Rest of vital signs within normal limits.  Given 20 mg labetalol for blood pressure control here in the ED.  BAT as activated and patient was taken emergently to the CT scanner.  CT Noncon showed left-sided chronic infarcts but no acute intracranial hemorrhage.  Patient is out of the TNK window at this time, will order MRI for further stroke workup.  Neuro/stroke team at bedside.  Stroke labs sent off.  Differential diagnosis includes stroke versus seizure like process.    Update: Labs notable for a white count of 11.1, hemoglobin stable at 13.8.  Chemistries relatively unremarkable.  Troponin within normal limits.  EKG showed normal sinus rhythm.  MRI of the brain was performed that showed moderate acute to subacute infarct involving the left corona  radiata.  Will admit to the neurostroke today for further evaluation/management.    ----    EKG Independent Interpretation: EKG interpreted by myself. Please see ED Course for full interpretation.    Independent Result Review and Interpretation: Relevant laboratory and radiographic results were reviewed and independently interpreted by myself.  As necessary, they are commented on in the ED Course.    Chronic conditions affecting the patient's care: As documented above in MDM.    The patient was discussed with the following consultants/services: Neurology regarding: CT imaging findings, and disposition planning.    Care Considerations: As documented above in MDM.    IV Thrombolysis IV Thrombolysis Checklist          ED Course:  ED Course as of 09/21/24 1438   Sat Sep 21, 2024   1229 EKG: Regular rate, regular rhythm.  AK, QRS, QTc intervals within normal limits.  No ST elevations, depressions, T wave versions.  Normal sinus rhythm. [AS]      ED Course User Index  [AS] Stephan Grady MD         Diagnoses as of 09/21/24 1438   Cerebrovascular accident (CVA), unspecified mechanism (Multi)       Disposition   As a result of their workup, the patient will require admission to the hospital.  The patient was informed of her diagnosis.  The patient was given the opportunity to ask questions and I answered them. The patient agreed to be admitted to the hospital.    Procedures   Procedures    Patient seen and discussed with ED attending physician.    Stephan Grady MD  Emergency Medicine     Stephan Grady MD  Resident  09/21/24 1438

## 2024-09-21 NOTE — ED TRIAGE NOTES
Pt presents to the ed for stroke like symptoms.  Per ECFD, pt LKW is 2100 on 9/20.  Pt awoke this am at 0400 with R sided weakness, R sided facial droop and slurred speech.  .

## 2024-09-21 NOTE — HOSPITAL COURSE
Valentina Fontanez is a 59 y.o. female with PMH of HTN, T2DM, acute viral meningitis with a generalized tonic-clonic seizures, and smoking history (~1PPD for 10 years, quit 7 years ago) who presented with right-sided weakness and slurred speech that started around 0400 that morning with LKW 2100 night prior. NIHSS 10 (LOCq 1, Facial paresis 2, RUE No effort 3, RLE Effort versus gravity 2, Dysarthria 1, Sensory 1). CT Head was negative for an acute process; mild gliosis of the left corona radiata suggestive of prior infarction. CT Angio was unremarkable. Home medications include lisinopril, Coreg, Lantus, and hydrochlorothiazide; she had not taken her hydrochlorothiazide or Carvedilol in a month. MRI Brain Stroke Protocol showed an acute to subacute left corona radiata ischemic infarction (volume 1.5 mL (cm³). She was not a candidate for TNK because outside of the window and no mechanical thrombectomy because no LVO.    Her hemoglobin A1c 8.1%. LDL was unable to be calculated in setting of hyper-triglyceridemia (498) so started on omega-3 fatty acids. Urine toxicology positive for cocaine. EKG was NSR. Etiology is small-vessel disease given risk factors superimposed with vasoconstriction from cocaine. She was started on Aspirin 81 mg and Atorvastatin 80 mg for secondary stroke prevention. Echocardiogram showed LVEF 69%, impaired relaxation pattern of LV diastolic filling, moderate concentric LVH, no PFO, normal LA size . After 48 hours of permissive hypertension,  her blood pressure medications were titrated up with the current regimen of lisinopril 40mg, hydrochlorothiazide 25mg BID, coreg 6.25mg BID.     She was cleared for a regular diet; started low-sodium and carb-control. She was evaluated by Physical Therapy and Occupational Therapy who both recommended Acute rehab. She was discharged to acute rehab in stable condition.    Course complicated by mechanical fall. No injury, no head trauma.    Follow up  appointments  :: Stroke neurology  - Follow up on management of Left corona radiata infarct    :: PCP  - Follow up post hospitalization and medical optimization of stroke risk factors

## 2024-09-21 NOTE — CONSULTS
Subjective     HPI  Valentina Fontanez is a 59 y.o. female with PMH of HTN, T2DM, acute viral meningitis with a generalized tonic-clonic seizures, and smoking history (~1PPD for 10 years, quit 7 years ago) presenting the emergency department with right-sided weakness and slurred speech that started around 0400 this morning. She reports feeling well when she went to bed around 2100 last night. This morning she fell when she woke up; denies any LOC. NIHSS 10 (LOCq 1, Facial paresis 2, RUE No effort 3, RLE Effort versus gravity 2, Dysarthria 1, Sensory 1). CT Head negative for acute process; mild gliosis of the left subinsular region suggestive of prior infarct. CT Angio was unremarkable. Her NIHSS was unchanged after head imaging. She reports 1 lifetime seizure during her viral encephalitis.  She denies taking any antiepileptic medication.  Denies dysuria, fever, shortness of breath, cough, neck pain, and current headache. Home medications include lisinopril, Coreg, Lantus, and HCTZ.     Per chart review, back in October 2023, patient presented with a first-time witnessed seizure. She was given IM Versed 5 mg, intubated (GCS 4) and sedated. She was febrile and started on Vanc/Zosyn and Ceftriaxone/Acyclovir/Dexamethasone for CNS coverage. Neurology was consulted for new-onset seizure and c/f meningitis.     EEG with focal epileptiform discharges coming from the left parietal lobe, but no seizures were recorded. She was diagnosed with viral encephalitis and started on Keppra 750 mg BID. EEG showed decreasing epileptogenicity with Keppra and it was continued on discharge. She denies taking it, stating she has ever only had one seizure.     Last Known Well: 2100 on 9/20  Blood Pressure: 228/118  Glucose: 233    EMS pre-notification?: Yes; BAT called at 1153  Time of stroke team arrival: Prior to patient  Direct to CT?: Yes  Time of CTH read by stroke team: As it was performed  Time stroke team called IR: N/A    Past  "Medical History  No past medical history on file.  Surgical History  No past surgical history on file.  Allergies  Tetanus toxoid, Gemfibrozil, and Morphine    Home Medications  Current Outpatient Medications   Medication Instructions    carvedilol (COREG) 12.5 mg, oral, 2 times daily    folic acid (Folvite) 1 mg tablet Take 1 tablet (1 mg) by mouth once daily. Do not start before October 7, 2023.    hydroCHLOROthiazide (HYDRODIURIL) 25 mg, oral, Daily    insulin glargine (LANTUS) 25 Units, subcutaneous, Nightly, Take as directed per insulin instructions.    lisinopril 40 mg, oral, Daily    multivitamin with minerals tablet 1 tablet, oral, Daily           Objective   24h Vitals  Heart Rate:  []   Temperature:  [37.1 °C (98.8 °F)]   Respirations:  [16-18]   BP: (172-228)/(111-118)   Height:  [173.7 cm (5' 8.4\")]   Weight:  [81.6 kg (180 lb)]   Pulse Ox:  [95 %-97 %]      Physical Exam  Neurological Exam  Mental Status   Mild dysarthria present.    Cranial Nerves  CN II: Right visual acuity: Counts fingers. Left visual acuity: Counts fingers.  CN III, IV, VI: Extraocular movements intact bilaterally. Pupils equal round and reactive to light bilaterally.  CN V:  Right: Abnormal facial sensation:  Left: Facial sensation is normal on the left. Reports decreased sensation over right side of face.  CN VII:  Right: There is central facial weakness.  Left: There is no facial weakness.  CN VIII: Intact to conversation.    Motor  Normal muscle bulk throughout. Normal muscle tone. The following abnormal movements were seen: Intention tremor with finger to nose.    Strength grossly intact on left. No effort versus gravity of RUE. Effort versus gravity of RLE..    Sensory  Light touch abnormality: Reports decreased sensation over right extremities.     Reflexes                                            Right                      Left  Brachioradialis                    2+                         2+  Biceps                  "                2+                         2+  Patellar                                2+                         2+  Achilles                                1+                         1+    Physical Exam  HENT:      Head: Normocephalic.   Eyes:      Extraocular Movements: Extraocular movements intact.      Pupils: Pupils are equal, round, and reactive to light.   Neck:      Meningeal: Brudzinski's sign absent.   Cardiovascular:      Rate and Rhythm: Normal rate.   Pulmonary:      Effort: Pulmonary effort is normal.   Musculoskeletal:      Cervical back: Full passive range of motion without pain.   Skin:     General: Skin is warm and dry.   Neurological:      Mental Status: She is disoriented.      Cranial Nerves: Dysarthria present.      Deep Tendon Reflexes:      Reflex Scores:       Bicep reflexes are 2+ on the right side and 2+ on the left side.       Brachioradialis reflexes are 2+ on the right side and 2+ on the left side.       Patellar reflexes are 2+ on the right side and 2+ on the left side.       Achilles reflexes are 1+ on the right side and 1+ on the left side.  Psychiatric:         Mood and Affect: Affect is tearful.       NIHSS    1a  Level of consciousness: 0=alert; keenly responsive   1b. LOC questions:  1=Performs one task correctly   1c. LOC commands: 0=Performs both tasks correctly   2.  Best Gaze: 0=normal   3. Visual: 0=No visual loss   4. Facial Palsy: 2=Partial paralysis (total or near total paralysis of the lower face)   5a. Motor Left Arm: 0=No drift, limb holds 90 (or 45) degrees for full 10 seconds   5b.  Motor Right Arm: 3=No effort against gravity, limb falls   6a. Motor Left Le=No drift, limb holds 90 (or 45) degrees for full 10 seconds   6b  Motor Right Le=Some effort against gravity, limb cannot get to or maintain (if cured) 90 (or 45) degrees, drifts down to bed, but has some effort against gravity   7. Limb Ataxia: 0=Absent   8.  Sensory: 1=Mild to moderate sensory loss;  "patient feels pinprick is less sharp or is dull on the affected side; there is a loss of superficial pain with pinprick but patient is aware She is being touched   9. Best Language:  0=No aphasia, normal   10. Dysarthria: 1=Mild to moderate, patient slurs at least some words and at worst, can be understood with some difficulty   11. Extinction and Inattention: 0=No abnormality          Total:   10        Recent Labs        No lab exists for component: \"PHOSPHORUS\"              Lab Results   Component Value Date    HGBA1C 7.0 (H) 03/26/2024    HGBA1C 13.4 (H) 10/01/2023    HGBA1C 10.9 (A) 12/28/2021              No results found for: \"BNP\"    Imaging  MRI head results: MR brain w and wo IV contrast    Result Date: 10/3/2023  1. No acute intracranial abnormality and no abnormal intracranial enhancement or mass. No mass effect. 2. Chronic intracranial findings as above including findings of probable chronic small vessel ischemic changes and probable old small lacunar infarcts.   I personally reviewed the images/study and I agree with the findings as stated. This study was interpreted at [place], [place].   MACRO: None.   Signed by: Price Posey 10/3/2023 1:23 AM Dictation workstation:   [id number]   CT head results: No CT head results found for the past 12 months  Echocardiography results: No echocardiogram results found for the past 12 months  EEG results: EEG    Result Date: 5/29/2024  This is normal vEEG. No epileptiform activity or lateralization signs are noted. Of note, more than 80% of recording is obscured by artifacts. This is a duplicate order for 10/4/23      Stroke Alert CT/MRI review: Was interpreted as it was being performed     IV Thrombolysis IV Thrombolysis Checklist    IV Thrombolysis Given: No; Thrombolysis contraindication reason: Working diagnosis is NOT a suspected ischemic stroke and Time from Last Known Well (or stroke onset) is >4.5 hours "     Assessment/Plan   Valentina Fontanez is a 59-year-old female with PMH of HTN, T2DM, acute viral meningitis with a generalized tonic-clonic seizure, and smoking history (~1PPD for 10 years, quit 7 years ago) who presented with right-sided weakness and slurred speech with LKW night prior around 2100. NIHSS 10 (LOCq 1, Facial paresis 2, RUE No effort 3, RLE Effort versus gravity 2, Dysarthria 1, Sensory 1). CT Head with prior left lacunar infarction and CT Angio was unremarkable. Differential diagnoses include post-ictal Omar's Paralysis (lactate 3.1 and off Keppra) versus recrudescence of prior lacunar stroke. Recommend routine EEG to evaluate for former and MRI Brain Stroke Protocol to evaluate for latter. No concern for meningitis/encephalitis at this time given afebrile, normal CBC, and negative Brudzinski's sign.    Recommendations:  Routine EEG  Stroke workup  MRI brain w/o contrast Stroke Protocol  EKG, troponin  Utox  TTE w/ bubble study  Lipid panel, A1c  sBP goal < 220  NPO until nurse bedside swallow assessment   Consider focused stroke order set     Vascular Risk Factor modification goals:  Blood pressure goals: avoid hypotension SBP <100 that could worsen cerebral perfusion, Ischemic stroke- early permissive hypertension SBP < 220 mmHg with cautious inpatient lowering  Lipid Goals: education on healthy diet and statin therapy to maintain or achieve goal LDL-cholesterol < 70mg  Glucose Goals: early treatment of hyperglycemia to goal glucose 140-180 mg/dl with long-term goal A1c < 7%   Smoking Cessation and Education  Assessment for Rehabilitation needs including PT/OT and if indicated swallow evaluation and speech therapy   Patient and family education on signs and symptoms of stroke, calling 911, risk factors, and healthy strategies for stroke prevention.      CODE STATUS: Full Code  Neck of Kin: Aparna Fontanez (Mother): [phone]     Rica Arreola MD  PGY-3 Child Neurology  Stroke  [phone]    Recommendations are not final until formally staffed with the attending physician, Dr. Gallegos.

## 2024-09-21 NOTE — H&P
History Of Present Illness  Valentina Fontanez is a 59 y.o. female with PMH of HTN, T2DM, acute viral meningitis with a generalized tonic-clonic seizures, and smoking history (~1PPD for 10 years, quit 7 years ago) presenting the emergency department with right-sided weakness and slurred speech that started around 0400 this morning. She reports feeling well when she went to bed around 2100 last night. This morning she fell when she woke up; denies any LOC. NIHSS 10 (LOCq 1, Facial paresis 2, RUE No effort 3, RLE Effort versus gravity 2, Dysarthria 1, Sensory 1). CT Head negative for acute process; mild gliosis of the left corona radiata suggestive of prior infarction. CT Angio was unremarkable. Her NIHSS was unchanged after head imaging. She reports 1 lifetime seizure during her viral encephalitis.  She denies taking any antiepileptic medication.  Denies dysuria, fever, shortness of breath, cough, neck pain, and current headache. Home medications include lisinopril, Coreg, Lantus, and hydrochlorothiazide; she had not taken her hydrochlorothiazide or Carvedilol in a month.      Per chart review, back in October 2023, patient presented with a first-time witnessed seizure. She was given IM Versed 5 mg, intubated (GCS 4) and sedated. She was febrile and started on Vanc/Zosyn and Ceftriaxone/Acyclovir/Dexamethasone for CNS coverage. Neurology was consulted for new-onset seizure and c/f meningitis. EEG with focal epileptiform discharges coming from the left parietal lobe, but no seizures were recorded. She was diagnosed with viral encephalitis and started on Keppra 750 mg BID. EEG showed decreasing epileptogenicity with Keppra and it was continued on discharge. She denies taking it, stating she has ever only had one seizure.      Last Known Well: 2100 on 9/20  Blood Pressure: 228/118  Glucose: 233     Had stroke symptoms resolved at time of presentation: No    Past Medical History  No past medical history on  file.  Surgical History  No past surgical history on file.  Social History  Social History     Tobacco Use    Smoking status: Never     Passive exposure: Never    Smokeless tobacco: Never   Vaping Use    Vaping status: Unknown   Substance Use Topics    Alcohol use: Yes     Comment: unknown per family    Drug use: Never     Allergies  Tetanus toxoid, Gemfibrozil, and Morphine  Home Medications  (Not in a hospital admission)    Physical Exam  Neurological Exam  Mental Status   Mild dysarthria present.     Cranial Nerves  CN II: Right visual acuity: Counts fingers. Left visual acuity: Counts fingers.  CN III, IV, VI: Extraocular movements intact bilaterally. Pupils equal round and reactive to light bilaterally.  CN V:  Right: Abnormal facial sensation:  Left: Facial sensation is normal on the left. Reports decreased sensation over right side of face.  CN VII:  Right: There is central facial weakness.  Left: There is no facial weakness.  CN VIII: Intact to conversation.     Motor  Normal muscle bulk throughout. Normal muscle tone. The following abnormal movements were seen: Intention tremor with finger to nose.     Strength grossly intact on left. No effort versus gravity of RUE. Effort versus gravity of RLE..     Sensory  Light touch abnormality: Reports decreased sensation over right extremities.      Reflexes                                            Right                      Left  Brachioradialis                    2+                         2+  Biceps                                 2+                         2+  Patellar                                2+                         2+  Achilles                                1+                         1+     Physical Exam  HENT:      Head: Normocephalic.   Eyes:      Extraocular Movements: Extraocular movements intact.      Pupils: Pupils are equal, round, and reactive to light.   Neck:      Meningeal: Brudzinski's sign absent.   Cardiovascular:      Rate and  Rhythm: Normal rate.   Pulmonary:      Effort: Pulmonary effort is normal.   Musculoskeletal:      Cervical back: Full passive range of motion without pain.   Skin:     General: Skin is warm and dry.   Neurological:      Mental Status: She is disoriented.      Cranial Nerves: Dysarthria present.      Deep Tendon Reflexes:      Reflex Scores:       Bicep reflexes are 2+ on the right side and 2+ on the left side.       Brachioradialis reflexes are 2+ on the right side and 2+ on the left side.       Patellar reflexes are 2+ on the right side and 2+ on the left side.       Achilles reflexes are 1+ on the right side and 1+ on the left side.  Psychiatric:         Mood and Affect: Affect is tearful.         NIHSS     1a  Level of consciousness: 0=alert; keenly responsive   1b. LOC questions:  1=Performs one task correctly   1c. LOC commands: 0=Performs both tasks correctly   2.  Best Gaze: 0=normal   3. Visual: 0=No visual loss   4. Facial Palsy: 2=Partial paralysis (total or near total paralysis of the lower face)   5a. Motor Left Arm: 0=No drift, limb holds 90 (or 45) degrees for full 10 seconds   5b.  Motor Right Arm: 3=No effort against gravity, limb falls   6a. Motor Left Le=No drift, limb holds 90 (or 45) degrees for full 10 seconds   6b  Motor Right Le=Some effort against gravity, limb cannot get to or maintain (if cured) 90 (or 45) degrees, drifts down to bed, but has some effort against gravity   7. Limb Ataxia: 0=Absent   8.  Sensory: 1=Mild to moderate sensory loss; patient feels pinprick is less sharp or is dull on the affected side; there is a loss of superficial pain with pinprick but patient is aware She is being touched   9. Best Language:  0=No aphasia, normal   10. Dysarthria: 1=Mild to moderate, patient slurs at least some words and at worst, can be understood with some difficulty   11. Extinction and Inattention: 0=No abnormality             Total:   10       Last Recorded Vitals  Blood  "pressure (!) 172/111, pulse 81, temperature 37.1 °C (98.8 °F), temperature source Temporal, resp. rate 16, height 1.737 m (5' 8.4\"), weight 81.6 kg (180 lb), SpO2 97%.          Relevant Results  Results for orders placed or performed during the hospital encounter of 09/21/24 (from the past 24 hour(s))   POCT GLUCOSE   Result Value Ref Range    POCT Glucose 233 (H) 74 - 99 mg/dL   Blood Gas Venous Full Panel Unsolicited   Result Value Ref Range    POCT pH, Venous 7.48 (H) 7.33 - 7.43 pH    POCT pCO2, Venous 46 41 - 51 mm Hg    POCT pO2, Venous 38 35 - 45 mm Hg    POCT SO2, Venous 62 45 - 75 %    POCT Oxy Hemoglobin, Venous 60.6 45.0 - 75.0 %    POCT Hematocrit Calculated, Venous 43.0 36.0 - 46.0 %    POCT Sodium, Venous 142 136 - 145 mmol/L    POCT Potassium, Venous 3.6 3.5 - 5.3 mmol/L    POCT Chloride, Venous 106 98 - 107 mmol/L    POCT Ionized Calicum, Venous 1.16 1.10 - 1.33 mmol/L    POCT Glucose, Venous 256 (H) 74 - 99 mg/dL    POCT Lactate, Venous 3.1 (H) 0.4 - 2.0 mmol/L    POCT Base Excess, Venous 9.4 (H) -2.0 - 3.0 mmol/L    POCT HCO3 Calculated, Venous 34.3 (H) 22.0 - 26.0 mmol/L    POCT Hemoglobin, Venous 14.4 12.0 - 16.0 g/dL    POCT Anion Gap, Venous 5.0 (L) 10.0 - 25.0 mmol/L    Patient Temperature 37.0 degrees Celsius   CBC and Auto Differential   Result Value Ref Range    WBC 11.1 4.4 - 11.3 x10*3/uL    nRBC 0.0 0.0 - 0.0 /100 WBCs    RBC 4.65 4.00 - 5.20 x10*6/uL    Hemoglobin 13.8 12.0 - 16.0 g/dL    Hematocrit 39.3 36.0 - 46.0 %    MCV 85 80 - 100 fL    MCH 29.7 26.0 - 34.0 pg    MCHC 35.1 32.0 - 36.0 g/dL    RDW 12.2 11.5 - 14.5 %    Platelets 296 150 - 450 x10*3/uL    Neutrophils % 66.8 40.0 - 80.0 %    Immature Granulocytes %, Automated 0.2 0.0 - 0.9 %    Lymphocytes % 25.5 13.0 - 44.0 %    Monocytes % 6.7 2.0 - 10.0 %    Eosinophils % 0.5 0.0 - 6.0 %    Basophils % 0.3 0.0 - 2.0 %    Neutrophils Absolute 7.42 1.20 - 7.70 x10*3/uL    Immature Granulocytes Absolute, Automated 0.02 0.00 - 0.70 " x10*3/uL    Lymphocytes Absolute 2.84 1.20 - 4.80 x10*3/uL    Monocytes Absolute 0.75 0.10 - 1.00 x10*3/uL    Eosinophils Absolute 0.06 0.00 - 0.70 x10*3/uL    Basophils Absolute 0.03 0.00 - 0.10 x10*3/uL   Comprehensive metabolic panel   Result Value Ref Range    Glucose 225 (H) 74 - 99 mg/dL    Sodium 140 136 - 145 mmol/L    Potassium 3.3 (L) 3.5 - 5.3 mmol/L    Chloride 102 98 - 107 mmol/L    Bicarbonate 25 21 - 32 mmol/L    Anion Gap 16 10 - 20 mmol/L    Urea Nitrogen 11 6 - 23 mg/dL    Creatinine 0.75 0.50 - 1.05 mg/dL    eGFR >90 >60 mL/min/1.73m*2    Calcium 9.7 8.6 - 10.6 mg/dL    Albumin 4.3 3.4 - 5.0 g/dL    Alkaline Phosphatase 101 33 - 136 U/L    Total Protein 8.0 6.4 - 8.2 g/dL    AST 31 9 - 39 U/L    Bilirubin, Total 0.8 0.0 - 1.2 mg/dL    ALT 19 7 - 45 U/L   Troponin I, High Sensitivity   Result Value Ref Range    Troponin I, High Sensitivity (CMC) 22 0 - 34 ng/L   Protime-INR   Result Value Ref Range    Protime 12.2 9.8 - 12.8 seconds    INR 1.1 0.9 - 1.1   APTT   Result Value Ref Range    aPTT 30 27 - 38 seconds   Blood Gas Lactic Acid, Venous   Result Value Ref Range    POCT Lactate, Venous 3.1 (H) 0.4 - 2.0 mmol/L   Hemoglobin A1c   Result Value Ref Range    Hemoglobin A1C 8.1 (H) See comment %    Estimated Average Glucose 186 Not Established mg/dL   Lipid Panel   Result Value Ref Range    Cholesterol 252 (H) 0 - 199 mg/dL    HDL-Cholesterol 49.3 mg/dL    Cholesterol/HDL Ratio 5.1     LDL Calculated      VLDL      Triglycerides 498 (H) 0 - 149 mg/dL    Non HDL Cholesterol 203 (H) 0 - 149 mg/dL   Lavender Top   Result Value Ref Range    Extra Tube Hold for add-ons.         NIH Stroke Scale  1A. Level of Consciousness: Alert, Keenly Responsive  1B. Ask Month and Age: 1 Question Right  1C. Blink Eyes & Squeeze Hands: Performs Both Tasks  2. Best Gaze: Normal  3. Visual: No Visual Loss  4. Facial Palsy: Partial Paralysis  5A. Motor - Left Arm: No Drift  5B. Motor - Right Arm: No Effort Against  "Rug[place]  6A. Motor - Left Leg: No Drift  6B. Motor - Right Leg: Some Effort Against Gravity  7. Limb Ataxia: Absent  8. Sensory Loss: Mild-to-Moderate Sensory Loss  9. Best Language: No Aphasia  10. Dysarthria: Mild-to-Moderate Dysarthria  11. Extinction and Inattention: No Abnormality  NIH Stroke Scale: 10    MRI head results: MR brain wo IV contrast    Result Date: 9/21/2024  Moderate acute to subacute infarct involving the left corona radiata, posterior limb left internal capsule and left basal ganglia with smaller additional focus in the left centrum semiovale. No hemorrhagic transformation or mass effect. Please correlate clinically.   MACRO: None   Signed by: Emmanuel Zamora 9/21/2024 2:16 PM Dictation workstation:   [id number]    MR brain w and wo IV contrast    Result Date: 10/3/2023  1. No acute intracranial abnormality and no abnormal intracranial enhancement or mass. No mass effect. 2. Chronic intracranial findings as above including findings of probable chronic small vessel ischemic changes and probable old small lacunar infarcts.   I personally reviewed the images/study and I agree with the findings as stated. This study was interpreted at [place], [place].   MACRO: None.   Signed by: Price Posey 10/3/2023 1:23 AM Dictation workstation:   [id number]   CT head results: No CT head results found for the past 12 months  EEG results: EEG    Result Date: 5/29/2024  This is normal vEEG. No epileptiform activity or lateralization signs are noted. Of note, more than 80% of recording is obscured by artifacts. This is a duplicate order for 10/4/23         Results from last 7 days   Lab Units 09/21/24  1217   HEMOGLOBIN A1C % 8.1*     No results found for: \"BNP\"      Stroke Alert CT/MRI review: Was interpreted as it was being performed     IV Thrombolysis  IV Thrombolysis Given: No; Thrombolysis contraindication reason: Time from Last Known Well (or stroke onset) is >4.5 " hours          Assessment/Plan   Assessment & Plan  Acute ischemic stroke (Multi)    Valentina Fontanez is a 59-year-old female with PMH of HTN, T2DM, acute viral meningitis with a generalized tonic-clonic seizure, and smoking history (~1PPD for 10 years, quit 7 years ago) who presented with right-sided weakness and slurred speech with LKW night prior around 2100. NIHSS 10 (LOCq 1, Facial paresis 2, RUE No effort 3, RLE Effort versus gravity 2, Dysarthria 1, Sensory 1). CT Head with prior left lacunar infarction and CT Angio was unremarkable. Initial differential diagnoses included post-ictal Omar's Paralysis (lactate 3.1 and off Keppra) versus recrudescence of prior lacunar stroke. Recommend MRI Brain Stroke Protocol to evaluate for latter which showed an acute to subacute left corona radiata ischemic infarction (volume 1.5 mL (cm³). She was not a candidate for TNK because outside of the window and no mechanical thrombectomy because no LVO. She was admitted to Stroke Neurology to complete Stroke work-up.    EMS pre-notification?: Yes; BAT called at 1153  Time of stroke team arrival: Prior to patient  Direct to CT?: Yes  Time of CTH read by stroke team: As it was performed  Time stroke team called IR: N/A    Stroke Classification:  Type: Ischemic stroke  Subtype/etiology: Likely small vessel, but pending work-up  Vessels involved: Lenticulostriate   Neurological manifestations:  Pre-Intervention Deficits: NIHSS 10 (LOCq 1, Facial paresis 2, RUE No effort 3, RLE Effort versus gravity 2, Dysarthria 1, Sensory 1)  Pre-stroke mRS: 0  Initial treatment: Aspirin 81 mg  Anti-platelets or Anti-coagulation management: Aspirin  Vascular Risk Factors: HTN, T2DM, previous smoker  Antiplatelet/antithrombotic plan for stroke prevention: Will consider DAPT  VTE prophylaxis: Lovenox 40 mg daily    #Left corona radiata infarction  :: Hemoglobin A1c 8.1%, LDL pending  - Admit to floor with Telemetry  - NPO until nurse bedside swallow  assessment   - Aspirin 81 mg daily  - Atorvastatin 40 mg nightly  - EKG, troponin  - Utox  - TTE w/ bubble study  - PT/OT/SLP    #HTN  - Permissive Hypertension < 220   - PRN Hydralazine and Labetolol  - Hold home lisinopril, Coreg, and HCTZ     #T2DM  - Lantus 15U nightly (home Lantus 25U nightly)  - SSI with POCT BG    FENGI  Diet: Adult diet Regular, Consistent Carb, Cardiac; CCD 60 gm/meal; Thin 0; 70 gm fat; 2 - 3 grams Sodium  Bowel Regimen: MiraLAX 17g daily    Vascular Risk Factor modification goals:  Blood pressure goals: avoid hypotension SBP <100 that could worsen cerebral perfusion, Ischemic stroke- early permissive hypertension SBP < 220 mmHg with cautious inpatient lowering  Lipid Goals: education on healthy diet and statin therapy to maintain or achieve goal LDL-cholesterol < 70mg  Glucose Goals: early treatment of hyperglycemia to goal glucose 140-180 mg/dl with long-term goal A1c < 7%   Smoking Cessation and Education  Assessment for Rehabilitation needs   Patient and family education on signs and symptoms of stroke, calling 911, healthy strategies for stroke prevention.      CODE STATUS: Full Code (Confirmed on admission)  Neck of Kin: Aparna Fontanez (Mother): [phone]     Patient to be discussed with attending physician, Dr. Gallegos, in the morning.    Rica Arreola MD  Child Neurology PGY3

## 2024-09-22 LAB
GLUCOSE BLD MANUAL STRIP-MCNC: 180 MG/DL (ref 74–99)
GLUCOSE BLD MANUAL STRIP-MCNC: 197 MG/DL (ref 74–99)
GLUCOSE BLD MANUAL STRIP-MCNC: 236 MG/DL (ref 74–99)
GLUCOSE BLD MANUAL STRIP-MCNC: 269 MG/DL (ref 74–99)

## 2024-09-22 PROCEDURE — 1210000001 HC SEMI-PRIVATE ROOM DAILY

## 2024-09-22 PROCEDURE — 2500000002 HC RX 250 W HCPCS SELF ADMINISTERED DRUGS (ALT 637 FOR MEDICARE OP, ALT 636 FOR OP/ED)

## 2024-09-22 PROCEDURE — 97167 OT EVAL HIGH COMPLEX 60 MIN: CPT | Mod: GO

## 2024-09-22 PROCEDURE — 2500000001 HC RX 250 WO HCPCS SELF ADMINISTERED DRUGS (ALT 637 FOR MEDICARE OP)

## 2024-09-22 PROCEDURE — 82947 ASSAY GLUCOSE BLOOD QUANT: CPT

## 2024-09-22 PROCEDURE — 97110 THERAPEUTIC EXERCISES: CPT | Mod: GP

## 2024-09-22 PROCEDURE — 97161 PT EVAL LOW COMPLEX 20 MIN: CPT | Mod: GP

## 2024-09-22 RX ORDER — ATORVASTATIN CALCIUM 80 MG/1
80 TABLET, FILM COATED ORAL NIGHTLY
Status: DISCONTINUED | OUTPATIENT
Start: 2024-09-22 | End: 2024-09-27 | Stop reason: HOSPADM

## 2024-09-22 RX ADMIN — ICOSAPENT ETHYL 2 G: 1 CAPSULE ORAL at 09:44

## 2024-09-22 RX ADMIN — ATORVASTATIN CALCIUM 80 MG: 80 TABLET, FILM COATED ORAL at 21:27

## 2024-09-22 RX ADMIN — INSULIN LISPRO 3 UNITS: 100 INJECTION, SOLUTION INTRAVENOUS; SUBCUTANEOUS at 12:19

## 2024-09-22 RX ADMIN — ICOSAPENT ETHYL 2 G: 1 CAPSULE ORAL at 18:03

## 2024-09-22 RX ADMIN — INSULIN LISPRO 1 UNITS: 100 INJECTION, SOLUTION INTRAVENOUS; SUBCUTANEOUS at 08:47

## 2024-09-22 RX ADMIN — ASPIRIN 81 MG 81 MG: 81 TABLET ORAL at 08:47

## 2024-09-22 RX ADMIN — INSULIN GLARGINE 15 UNITS: 100 INJECTION, SOLUTION SUBCUTANEOUS at 21:27

## 2024-09-22 ASSESSMENT — COGNITIVE AND FUNCTIONAL STATUS - GENERAL
DAILY ACTIVITIY SCORE: 14
WALKING IN HOSPITAL ROOM: TOTAL
STANDING UP FROM CHAIR USING ARMS: TOTAL
DRESSING REGULAR LOWER BODY CLOTHING: A LOT
HELP NEEDED FOR BATHING: A LOT
TURNING FROM BACK TO SIDE WHILE IN FLAT BAD: A LOT
TOILETING: A LOT
EATING MEALS: A LITTLE
MOVING TO AND FROM BED TO CHAIR: TOTAL
MOBILITY SCORE: 8
MOVING FROM LYING ON BACK TO SITTING ON SIDE OF FLAT BED WITH BEDRAILS: A LOT
CLIMB 3 TO 5 STEPS WITH RAILING: TOTAL
DRESSING REGULAR UPPER BODY CLOTHING: A LOT
PERSONAL GROOMING: A LITTLE

## 2024-09-22 ASSESSMENT — ACTIVITIES OF DAILY LIVING (ADL)
BATHING_ASSISTANCE: MODERATE
ADL_ASSISTANCE: INDEPENDENT
ADL_ASSISTANCE: INDEPENDENT

## 2024-09-22 ASSESSMENT — PAIN SCALES - GENERAL
PAINLEVEL_OUTOF10: 0 - NO PAIN

## 2024-09-22 ASSESSMENT — PAIN - FUNCTIONAL ASSESSMENT
PAIN_FUNCTIONAL_ASSESSMENT: 0-10
PAIN_FUNCTIONAL_ASSESSMENT: 0-10

## 2024-09-22 NOTE — PROGRESS NOTES
Occupational Therapy    Evaluation    Patient Name: Valentina Fontanez  MRN: [id number]  Department: [facility] ED  Room: TR02B/TR02B  Today's Date: 9/22/2024  Time Calculation  Start Time: 1136  Stop Time: 1201  Time Calculation (min): 25 min        Assessment:  OT Assessment: Pt presents with R sided deficits including ROM, strength, balance impeding occupational performance. Would benefit from intensive neuro based rehab for hopeful progression towards PLOF.  Prognosis: Good  Barriers to Discharge: Decreased caregiver support  Evaluation/Treatment Tolerance: Patient tolerated treatment well  End of Session Communication: Bedside nurse  End of Session Patient Position: Bed, 2 rail up (stretcher)  OT Assessment Results: Decreased ADL status, Decreased upper extremity range of motion, Decreased upper extremity strength, Decreased fine motor control, Decreased functional mobility, Decreased gross motor control, Decreased IADLs, Non-functional right upper extremity, Decreased trunk control for functional activities  Prognosis: Good  Barriers to Discharge: Decreased caregiver support  Evaluation/Treatment Tolerance: Patient tolerated treatment well  Strengths: Ability to acquire knowledge, Attitude of self  Barriers to Participation: Comorbidities  Plan:  Treatment Interventions: ADL retraining, Functional transfer training, UE strengthening/ROM, Patient/family training, Equipment evaluation/education, Neuromuscular reeducation, Fine motor coordination activities, Compensatory technique education  OT Frequency: 4 times per week  OT Discharge Recommendations: High intensity level of continued care  OT Recommended Transfer Status: Assist of 2  OT - OK to Discharge: Yes  Treatment Interventions: ADL retraining, Functional transfer training, UE strengthening/ROM, Patient/family training, Equipment evaluation/education, Neuromuscular reeducation, Fine motor coordination activities, Compensatory technique education    Subjective   Current  Problem:  1. Lacunar infarct, acute (Multi)        2. Cerebrovascular accident (CVA), unspecified mechanism (Multi)  Transthoracic Echo (TTE) Complete    Transthoracic Echo (TTE) Complete        General:  General  Reason for Referral: Presented to ED w/ R-sided weakness, slurred speech that started around 0400 that morning, fell when she woke up; denies any LOC. Reports feeling well when she went to bed around 2100 prior night. CT Head with prior left lacunar infarction, CT Angio was unremarkable. MRI Brain: acute to subacute L corona radiata ischemic infarction. Outside of window for TNK. NIHSS 10.  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 Cooperative throughout.  Precautions:  Medical Precautions: Fall precautions, Seizure precautions  Precautions Comment: -200    Vital Sign (Past 2hrs)        Date/Time Vitals Session Patient Position Pulse Resp SpO2 BP MAP (mmHg)    09/22/24 1138 Pre OT  Lying  81  26  97 %  193/109  149     09/22/24 1159 Post OT  Lying  76  19  96 %  189/139  156     09/22/24 1200 --  --  76  16  95 %  195/100  143                         Pain:  Pain Assessment  Pain Assessment: 0-10  0-10 (Numeric) Pain Score: 0 - No pain    Objective   Cognition:  Overall Cognitive Status: Within Functional Limits  Arousal/Alertness: Appropriate responses to stimuli  Orientation Level: Oriented X4  Following Commands: Follows all commands and directions without difficulty  Attention: Within Functional Limits  Safety/Judgement: Exceptions to WFL  Routine Tasks: Minimal           Home Living:  Type of Home: House  Lives With:  (Mother)  Home Adaptive Equipment: Walker rolling or standard, Cane, Wheelchair-manual (Rollator)  Home Layout:  Multi-level, 1/2 bath on main level, Bed/bath upstairs (stair lift to second floor where pt has been staying)  Home Access: Stairs to enter with rails  Entrance Stairs-Number of Steps: 6  Bathroom Shower/Tub: Walk-in shower  Bathroom Equipment: Grab bars in shower, Shower chair without back  Prior Function:  Level of [place]: Independent with ADLs and functional transfers, Independent with homemaking with ambulation  ADL Assistance: Independent  Homemaking Assistance: Independent  Ambulatory Assistance: Independent  Vocational: Retired  Hand Dominance: Left  Prior Function Comments: (+) driving    ADL:  Eating Assistance:  (setup/sup)  Grooming Assistance: Minimal  Bathing Assistance: Moderate  UE Dressing Assistance: Moderate  LE Dressing Assistance: Maximal  Toileting Assistance with Device: Maximal  Activity Tolerance:  Endurance: Tolerates 10 - 20 min exercise with multiple rests  Early Mobility/Exercise Safety Screen: Proceed with mobilization - No exclusion criteria met  Bed Mobility/Transfers: Bed Mobility  Bed Mobility: Yes  Bed Mobility 1  Bed Mobility 1: Rolling right  Level of Assistance 1: Contact guard  Bed Mobility Comments 1: use of bedrail  Bed Mobility 2  Bed Mobility  2: Side lying right to sit  Level of Assistance 2: Moderate assistance  Bed Mobility Comments 2: cues for compensatory hooking strategy to advance R LE, assist for trunk elevation with cues for anterior weight shifting to improve transition  Bed Mobility 3  Bed Mobility 3: Sitting to supine  Level of Assistance 3: Moderate assistance  Bed Mobility Comments 3: verb/tactile cues for body mechanics with assist for R LE elevation and trunk positioning    Transfers  Transfer: No      Functional Mobility:  Functional Mobility  Functional Mobility Performed: No  Sitting Balance:  Static Sitting Balance  Static Sitting-Balance Support: Left upper extremity supported  Static Sitting-Level of Assistance: Contact guard  Static  Sitting-Comment/Number of Minutes: cues for midline trunk     Vision:   Vision - Complex Assessment  Ocular Range of Motion: Within Functional Limits  Tracking: Decreased smoothness of eye movement to R superior field, Decreased smoothness of eye movement to R inferior field  Convergence: WFL  Acuity: Able to read clock/calendar on wall without difficulty  Diplopia Assessment: WFL  Sensation:  Light Touch: No apparent deficits (R UE)  Proprioception: Partial deficits in the RUE (elbow/wrist/hand)    Perception:  Inattention/Neglect: Cues to maintain midline in sitting  Initiation: Appears intact  Motor Planning: Appears intact  Perseveration: Not present    Hand Function:  Gross Grasp:  (L functional, R absent)  Extremities: RUE   RUE : Exceptions to WFL  RUE PROM (degrees)  RUE PROM Comment: WFL  RUE Strength  RUE Overall Strength:  (MMT 0/5)  RUE Tone  RUE Tone: Hypertonic (Elbow extensors, shoulder adductors)  Tone Assessment: MAS 1 in above muscle groups and LUE   LUE: Within Functional Limits    Outcome Measures:[facility] Daily Activity  Putting on and taking off regular lower body clothing: A lot  Bathing (including washing, rinsing, drying): A lot  Putting on and taking off regular upper body clothing: A lot  Toileting, which includes using toilet, bedpan or urinal: A lot  Taking care of personal grooming such as brushing teeth: A little  Eating Meals: A little  Daily Activity - Total Score: 14        Education Documentation  ADL Training, taught by Nivia Adair OT at 9/22/2024  1:02 PM.  Learner: Patient  Readiness: Acceptance  Method: Explanation  Response: Verbalizes Understanding    Education Comments  No comments found.        Goals:  Encounter Problems       Encounter Problems (Active)       ADLs       Patient with complete upper body dressing with stand by assist level of assistance        Start:  09/22/24    Expected End:  10/13/24            Patient with complete lower body dressing with minimal  assist  level of assistance         Start:  09/22/24    Expected End:  10/13/24               EXERCISE/STRENGTHENING       Patient with increase RUE to 2-/5 strength.       Start:  09/22/24    Expected End:  10/13/24               TRANSFERS       Patient will perform bed mobility stand by assist level of assistance in order to improve safety and independence with mobility       Start:  09/22/24    Expected End:  10/13/24            Patient will complete functional transfers with least restrictive device with minimal assist  level of assistance.       Start:  09/22/24    Expected End:  10/13/24                    MICHAEL MONTES OT

## 2024-09-22 NOTE — PROGRESS NOTES
"Valentina Fontanez is a 59 y.o. female on day 1 of admission presenting with Acute ischemic stroke (Multi).    Subjective   Interval Events: No acute events overnight. This morning she confirmed she took cocaine at a party on Thursday. Denies any concerns.     Objective     Last Recorded Vitals  Blood pressure (!) 177/95, pulse 71, temperature 37.1 °C (98.8 °F), temperature source Temporal, resp. rate 16, height 1.737 m (5' 8.4\"), weight 81.6 kg (180 lb), SpO2 (!) 93%.    Physical Exam  Neurological Exam    Physical Exam  HENT:      Head: Normocephalic. Atraumatic.  Eyes:      Extraocular Movements: Extraocular movements intact.      Pupils: Pupils are equal, round, and reactive to light.   Cardiovascular:      Rate and Rhythm: Normal rate.   Pulmonary:      Effort: Pulmonary effort is normal.   Musculoskeletal:      Cervical back: Full passive range of motion without pain.   Skin:     General: Skin is warm and dry.     Mental Status  Alert. Oriented to person, place, and time. Mild dysarthria present. Able to follow complex commands.     Cranial Nerves  CN II: Right visual acuity: Counts fingers. Left visual acuity: Counts fingers.  CN III, IV, VI: Extraocular movements intact bilaterally. Pupils equal round and reactive to light bilaterally.  CN V: Facial sensation is normal on the left. Reports decreased sensation over right side of face.  CN VII:   Right: There is central facial weakness.  Left: There is no facial weakness.  CN VIII: Intact to conversation.     Motor  Normal muscle bulk throughout. Normal muscle tone. The following abnormal movements were seen: Intention tremor with finger to nose. Strength grossly intact on left. No effort versus gravity of RUE. Effort versus gravity of RLE..     Sensory  Light touch abnormality: Reports decreased sensation over right extremities.        Relevant Results    NIH Stroke Scale  1A. Level of Consciousness: Alert, Keenly Responsive  1B. Ask Month and Age: Both Questions " Right  1C. Blink Eyes & Squeeze Hands: Performs Both Tasks  2. Best Gaze: Normal  3. Visual: No Visual Loss  4. Facial Palsy: Partial Paralysis  5A. Motor - Left Arm: No Drift  5B. Motor - Right Arm: No Effort Against Gravity  6A. Motor - Left Leg: No Drift  6B. Motor - Right Leg: No Effort Against [place]  7. Limb Ataxia: Absent  8. Sensory Loss: Mild-to-Moderate Sensory Loss  9. Best Language: No Aphasia  10. Dysarthria: Mild-to-Moderate Dysarthria  11. Extinction and Inattention: No Abnormality  NIH Stroke Scale: 10           Ferris Coma Scale  Best Eye Response: Spontaneous  Best Verbal Response: Oriented  Best Motor Response: Follows commands  Yair Coma Scale Score: 15              Results for orders placed or performed during the hospital encounter of 09/21/24 (from the past 24 hour(s))   POCT GLUCOSE   Result Value Ref Range    POCT Glucose 233 (H) 74 - 99 mg/dL   Blood Gas Venous Full Panel Unsolicited   Result Value Ref Range    POCT pH, Venous 7.48 (H) 7.33 - 7.43 pH    POCT pCO2, Venous 46 41 - 51 mm Hg    POCT pO2, Venous 38 35 - 45 mm Hg    POCT SO2, Venous 62 45 - 75 %    POCT Oxy Hemoglobin, Venous 60.6 45.0 - 75.0 %    POCT Hematocrit Calculated, Venous 43.0 36.0 - 46.0 %    POCT Sodium, Venous 142 136 - 145 mmol/L    POCT Potassium, Venous 3.6 3.5 - 5.3 mmol/L    POCT Chloride, Venous 106 98 - 107 mmol/L    POCT Ionized [place], Venous 1.16 1.10 - 1.33 mmol/L    POCT Glucose, Venous 256 (H) 74 - 99 mg/dL    POCT Lactate, Venous 3.1 (H) 0.4 - 2.0 mmol/L    POCT Base Excess, Venous 9.4 (H) -2.0 - 3.0 mmol/L    POCT HCO3 Calculated, Venous 34.3 (H) 22.0 - 26.0 mmol/L    POCT Hemoglobin, Venous 14.4 12.0 - 16.0 g/dL    POCT Anion Gap, Venous 5.0 (L) 10.0 - 25.0 mmol/L    Patient Temperature 37.0 degrees Celsius   CBC and Auto Differential   Result Value Ref Range    WBC 11.1 4.4 - 11.3 x10*3/uL    nRBC 0.0 0.0 - 0.0 /100 WBCs    RBC 4.65 4.00 - 5.20 x10*6/uL    Hemoglobin 13.8 12.0 - 16.0 g/dL     Hematocrit 39.3 36.0 - 46.0 %    MCV 85 80 - 100 fL    MCH 29.7 26.0 - 34.0 pg    MCHC 35.1 32.0 - 36.0 g/dL    RDW 12.2 11.5 - 14.5 %    Platelets 296 150 - 450 x10*3/uL    Neutrophils % 66.8 40.0 - 80.0 %    Immature Granulocytes %, Automated 0.2 0.0 - 0.9 %    Lymphocytes % 25.5 13.0 - 44.0 %    Monocytes % 6.7 2.0 - 10.0 %    Eosinophils % 0.5 0.0 - 6.0 %    Basophils % 0.3 0.0 - 2.0 %    Neutrophils Absolute 7.42 1.20 - 7.70 x10*3/uL    Immature Granulocytes Absolute, Automated 0.02 0.00 - 0.70 x10*3/uL    Lymphocytes Absolute 2.84 1.20 - 4.80 x10*3/uL    Monocytes Absolute 0.75 0.10 - 1.00 x10*3/uL    Eosinophils Absolute 0.06 0.00 - 0.70 x10*3/uL    Basophils Absolute 0.03 0.00 - 0.10 x10*3/uL   Comprehensive metabolic panel   Result Value Ref Range    Glucose 225 (H) 74 - 99 mg/dL    Sodium 140 136 - 145 mmol/L    Potassium 3.3 (L) 3.5 - 5.3 mmol/L    Chloride 102 98 - 107 mmol/L    Bicarbonate 25 21 - 32 mmol/L    Anion Gap 16 10 - 20 mmol/L    Urea Nitrogen 11 6 - 23 mg/dL    Creatinine 0.75 0.50 - 1.05 mg/dL    eGFR >90 >60 mL/min/1.73m*2    Calcium 9.7 8.6 - 10.6 mg/dL    Albumin 4.3 3.4 - 5.0 g/dL    Alkaline Phosphatase 101 33 - 136 U/L    Total Protein 8.0 6.4 - 8.2 g/dL    AST 31 9 - 39 U/L    Bilirubin, Total 0.8 0.0 - 1.2 mg/dL    ALT 19 7 - 45 U/L   Troponin I, High Sensitivity   Result Value Ref Range    Troponin I, High Sensitivity (CMC) 22 0 - 34 ng/L   Protime-INR   Result Value Ref Range    Protime 12.2 9.8 - 12.8 seconds    INR 1.1 0.9 - 1.1   APTT   Result Value Ref Range    aPTT 30 27 - 38 seconds   Blood Gas Lactic Acid, Venous   Result Value Ref Range    POCT Lactate, Venous 3.1 (H) 0.4 - 2.0 mmol/L   Hemoglobin A1c   Result Value Ref Range    Hemoglobin A1C 8.1 (H) See comment %    Estimated Average Glucose 186 Not Established mg/dL   Lipid Panel   Result Value Ref Range    Cholesterol 252 (H) 0 - 199 mg/dL    HDL-Cholesterol 49.3 mg/dL    Cholesterol/HDL Ratio 5.1     LDL Calculated       VLDL      Triglycerides 498 (H) 0 - 149 mg/dL    Non HDL Cholesterol 203 (H) 0 - 149 mg/dL   B-Type Natriuretic Peptide   Result Value Ref Range    BNP 30 0 - 99 pg/mL   ECG 12 lead   Result Value Ref Range    Ventricular Rate 92 BPM    Atrial Rate 92 BPM    OK Interval 126 ms    QRS Duration 78 ms    QT Interval 370 ms    QTC Calculation(Bazett) 457 ms    P Axis 43 degrees    R Axis -4 degrees    T Axis 102 degrees    QRS Count 15 beats    Q Onset 221 ms    P Onset 158 ms    P Offset 206 ms    T Offset 406 ms    QTC Fredericia 426 ms   Lavender Top   Result Value Ref Range    Extra Tube Hold for add-ons.    POCT GLUCOSE   Result Value Ref Range    POCT Glucose 193 (H) 74 - 99 mg/dL   Urinalysis with Reflex Culture and Microscopic   Result Value Ref Range    Color, Urine Light-Yellow Light-Yellow, Yellow, Dark-Yellow    Appearance, Urine Clear Clear    Specific Gravity, Urine >1.050 (N) 1.005 - 1.035    pH, Urine 7.0 5.0, 5.5, 6.0, 6.5, 7.0, 7.5, 8.0    Protein, Urine 300 (3+) (A) NEGATIVE, 10 (TRACE), 20 (TRACE) mg/dL    Glucose, Urine 50 (TRACE) (A) Normal mg/dL    Blood, Urine NEGATIVE NEGATIVE    Ketones, Urine NEGATIVE NEGATIVE mg/dL    Bilirubin, Urine NEGATIVE NEGATIVE    Urobilinogen, Urine Normal Normal mg/dL    Nitrite, Urine NEGATIVE NEGATIVE    Leukocyte Esterase, Urine NEGATIVE NEGATIVE   Urinalysis Microscopic   Result Value Ref Range    WBC, Urine NONE 1-5, NONE /HPF    RBC, Urine 1-2 NONE, 1-2, 3-5 /HPF    Squamous Epithelial Cells, Urine 1-9 (SPARSE) Reference range not established. /HPF   Drug Screen, Urine   Result Value Ref Range    Amphetamine Screen, Urine Presumptive Negative Presumptive Negative    Barbiturate Screen, Urine Presumptive Negative Presumptive Negative    Benzodiazepines Screen, Urine Presumptive Negative Presumptive Negative    Cannabinoid Screen, Urine Presumptive Negative Presumptive Negative    Cocaine Metabolite Screen, Urine Presumptive Positive (A) Presumptive Negative     Fentanyl Screen, Urine Presumptive Negative Presumptive Negative    Opiate Screen, Urine Presumptive Negative Presumptive Negative    Oxycodone Screen, Urine Presumptive Negative Presumptive Negative    PCP Screen, Urine Presumptive Negative Presumptive Negative    Methadone Screen, Urine Presumptive Negative Presumptive Negative         Assessment/Plan      Assessment & Plan  Acute ischemic stroke (Multi)    Valentina Fontanez is a 59-year-old female with PMH of HTN, T2DM, acute viral meningitis with a generalized tonic-clonic seizure, and smoking history (~1PPD for 10 years, quit 7 years ago) who presented with right-sided weakness and slurred speech with LKW night prior around 2100. NIHSS 10 (LOCq 1, Facial paresis 2, RUE No effort 3, RLE Effort versus gravity 2, Dysarthria 1, Sensory 1). CT Head with prior left lacunar infarction and CT Angio was unremarkable. MRI Brain Stroke Protocol showed an acute to subacute left corona radiata ischemic infarction (volume 1.5 mL (cm³). She was not a candidate for TNK because outside of the window and no mechanical thrombectomy because no LVO. Her hemoglobin A1c 8.1%. LDL was unable to be calculated in setting of hyper-triglyceridemia (498) so started on omega-3 fatty acids. Urine toxicology positive for cocaine. Etiology is small-vessel disease given risk factors superimposed with vasoconstriction from cocaine. She was started on Aspirin and Atorvastatin for secondary stroke prevention. She is admitted to Stroke Neurology to complete Stroke work-up.     Stroke Classification:  Type: Ischemic stroke  Subtype/etiology: Small vessel with vasoconstriction  Vessels involved: Lenticulostriate   Neurological manifestations:  Pre-Intervention Deficits: NIHSS 10 (LOCq 1, Facial paresis 2, RUE No effort 3, RLE Effort versus gravity 2, Dysarthria 1, Sensory 1)  Pre-stroke mRS: 0  Initial treatment: Aspirin 81 mg  Anti-platelets or Anti-coagulation management: Aspirin  Vascular Risk  Factors: HTN, T2DM, previous smoker  Antiplatelet/antithrombotic plan for stroke prevention: Will consider DAPT  VTE prophylaxis: Lovenox 40 mg daily     #Left corona radiata infarction  :: Hemoglobin A1c 8.1%, LDL unable to be calculated, Urine Toxicology cocaine-positive  :: Triglycerides: 498  :: EKG: NSR  - Admit to floor with Telemetry  - Aspirin 81 mg daily  - Atorvastatin 40 mg nightly  - Started omega 3 for elevated triglycerides.  - TTE w/ bubble study  - PT & OT evaluation     #HTN  - Permissive Hypertension < 220 for 24-48 hours  - PRN Hydralazine and Labetolol  - Hold home lisinopril, Coreg, and HCTZ      #T2DM  - Lantus 15U nightly (home Lantus 25U nightly)  - SSI with POCT BG  - Urinalysis with 3+ protein consistent with diabetic nephropathy: Will consider starting ACE-Inhibitor once off permissive hypertension     FENGI  Diet: Adult diet Regular, Consistent Carb, Cardiac; CCD 60 gm/meal; Thin 0; 70 gm fat; 2 - 3 grams Sodium  Bowel Regimen: MiraLAX 17g daily     Vascular Risk Factor modification goals:  Blood pressure goals: avoid hypotension SBP <100 that could worsen cerebral perfusion, Ischemic stroke- early permissive hypertension SBP < 220 mmHg with cautious inpatient lowering  Lipid Goals: education on healthy diet and statin therapy to maintain or achieve goal LDL-cholesterol < 70mg  Glucose Goals: early treatment of hyperglycemia to goal glucose 140-180 mg/dl with long-term goal A1c < 7%   Smoking Cessation and Education  Assessment for Rehabilitation needs   Patient and family education on signs and symptoms of stroke, calling 911, healthy strategies for stroke prevention.    CODE STATUS: Full Code (Confirmed on admission)  Neck of Kin: Aparna Fontanez (Mother): [phone]      Patient discussed with Dr. Gallegos.    Rica Arreola MD  Child Neurology PGY3

## 2024-09-22 NOTE — PROGRESS NOTES
Physical Therapy    Physical Therapy Evaluation & Treatment    Patient Name: Valentina Fontanez  MRN: [id number]  Department: [facility] ED  Room: TR02B/TR02B  Today's Date: 9/22/2024   Time Calculation  Start Time: 1015  Stop Time: 1045  Time Calculation (min): 30 min    Assessment/Plan   PT Assessment  PT Assessment Results: Decreased strength, Decreased range of motion, Decreased endurance, Impaired balance, Decreased mobility, Decreased coordination  Rehab Prognosis: Good  Barriers to Discharge: none  Evaluation/Treatment Tolerance: Patient tolerated treatment well  Medical Staff Made Aware: Yes  Strengths: Ability to acquire knowledge, Attitude of self, Access to adaptive/assistive products, Premorbid level of function, Support of extended family/friends, Insight into problems  End of Session Communication: Bedside nurse  Assessment Comment: Pt. is a 59 yof that presents with impairments including R sided UE and LE weakness, decreased activity tolerance/endurance, impaired balance, and increased difficulty with functional mobility compared to baseline level of function. Pt. will benefit from skilled PT intervention while inpatient to address the above deficits and maximize return to PLOF. Pt will benefit from HIGH intensity PT upon discharge.  End of Session Patient Position:  (Stretcher, 2 rails up, call light in reach, alarm on, R UE propped on pillow)     IP OR SWING BED PT PLAN  Inpatient or Swing Bed: Inpatient  PT Plan  Treatment/Interventions: Bed mobility, Transfer training, Gait training, Balance training, Strengthening, Neuromuscular re-education, Endurance training, Range of motion, Therapeutic exercise, Therapeutic activity, Home exercise program, Positioning, Postural re-education  PT Plan: Ongoing PT  PT Frequency: 5 times per week  PT Discharge Recommendations: High intensity level of continued care  Equipment Recommended upon Discharge:  (TBD at Rehab)  PT Recommended Transfer Status: Assist x2      Subjective      General Visit Information:  General  Reason for Referral: Pt presented to ED with right-sided weakness and slurred speech with LKW night prior around 2100. +Fall, no LOC. NIHSS 10. CTH prior left lacunar infarction and CT Angio unremarkable. MRI showed acute to subacute left corona radiata ischemic infarction. Pt outside of window for TNK, no mechanical thrombectomy due to no LVO.  Past Medical History Relevant to Rehab: HTN, T2DM, acute viral meningitis with a generalized tonic-clonic seizures, and smoking history (~1PPD for 10 years, quit 7 years ago)  Family/Caregiver Present: No  Prior to Session Communication: Bedside nurse  Patient Position Received:  (Stretcher, one rail up, alarm on)  Preferred Learning Style: auditory, verbal  General Comment: Pt received supine in bed, pleasant and agreeable to participate in session. Pt difficult at times 2/2 garbled speech.    Home Living:  Home Living  Type of Home: House  Lives With: Parent(s) (Mother)  Home Adaptive Equipment: Walker rolling or standard, Cane, Wheelchair-manual (rollator, stair lift from 1st to 2nd floor)  Home Layout: Multi-level, 1/2 bath on main level, Bed/bath upstairs (Pt.'s bedroom is on 3rd floor though reports has been staying in spare room on 2nd floor as pt has stair lift from 1st to 2nd floor. Walk in shower on 2nd level.)  Home Access: Stairs to enter with rails  Entrance Stairs-Number of Steps: 6  Bathroom Shower/Tub: Walk-in shower  Bathroom Equipment: Grab bars in shower, Shower chair without back  Home Living Comments: Pt reports mother is IND, does not need physical assist from patient    Prior Level of Function:  Prior Function Per Pt/Caregiver Report  Level of [place]: Independent with ADLs and functional transfers, Independent with homemaking with ambulation  ADL Assistance: Independent  Homemaking Assistance: Independent  Ambulatory Assistance: Independent (Community ambulator, no AD, +fall prior to admission 2/2 new  onset of weakness)  Vocational: Retired  Leisure: Nick  Hand Dominance: Left  Prior Function Comments: +drives    Precautions:  Precautions  Hearing/Visual Limitations: Pt denied blurred vision, appears to track appropriately  Medical Precautions: Fall precautions, Seizure precautions  Precautions Comment: Allow for permissive HTN with SBP <220 for 24-48 hours. Avoid hypotension SBP <100    Vital Signs (Past 2hrs)        Date/Time Vitals Session Patient Position Pulse Resp SpO2 BP MAP (mmHg)    09/22/24 1016 Pre PT  Lying  85  12  98 %  209/108  127     09/22/24 1045 Post PT  Lying  83  13  96 %  188/102  --                        Objective   Pain:  Pain Assessment  Pain Assessment: 0-10  0-10 (Numeric) Pain Score: 0 - No pain  Pain Descriptors:  (Stiffness)    Cognition:  Cognition  Overall Cognitive Status: Within Functional Limits  Arousal/Alertness: Appropriate responses to stimuli  Orientation Level: Oriented X4  Following Commands: Follows all commands and directions without difficulty  Cognition Comments: Pt tearful at times re: current state  Impulsive: Within functional limits    General Assessments:  Activity Tolerance  Endurance: Tolerates 10 - 20 min exercise with multiple rests  Early Mobility/Exercise Safety Screen: Proceed with mobilization - No exclusion criteria met  Activity Tolerance Comments: Pt tolerated EOB activities ~10 min. Further progression limited by dizziness, returned to supine    Sensation  Light Touch:  (Attempted to assess light touch to R LE with vision occluded though pt repeatedly opening eyes during assessment. Pt denied N/T in josé miguel LEs.)    Postural Control  Postural Control: Within Functional Limits    Static Sitting Balance  Static Sitting-Level of Assistance: Contact guard  Dynamic Sitting Balance  Dynamic Sitting-Level of Assistance:  (CGA initially though brief periods of minAx1 2/2 fatigue and dizziness)    Functional Assessments:  Bed Mobility  Bed Mobility: Yes  Bed  Mobility 1  Bed Mobility 1: Supine to sitting  Level of Assistance 1: Moderate assistance, Moderate verbal cues  Bed Mobility Comments 1: HOB elevated, assist to advance R LE and upright trunk  Bed Mobility 2  Bed Mobility  2: Sitting to supine  Level of Assistance 2: Maximum assistance, Maximum verbal cues  Bed Mobility Comments 2: Increased assist to elevate josé miguel LEs into bed. Pt limited by dizziness  Bed Mobility 3  Bed Mobility 3: Rolling right  Level of Assistance 3: Minimum assistance, Minimal verbal cues  Bed Mobility Comments 3: Pt completed rolling to R to adjust draw sheet  Bed Mobility 4  Bed Mobility 4: Rolling left  Level of Assistance 4: Maximum assistance  Bed Mobility Comments 4: Increased assist to initiate roll to L though once in sidelying pt able to utilize L UE to maintain position with minAx1  Bed Mobility 5  Bed Mobility 5: Scooting  Level of Assistance 5: Dependent, +2  Bed Mobility Comments 5: Boost, draw sheet    Transfers  Transfer: No (Deferred as pt became dizzy with EOB activities. Returned to supine for safety.)    Ambulation/Gait Training  Ambulation/Gait Training Performed: No    Stairs  Stairs: No    Extremity/Trunk Assessments:  RUE   RUE :  (Flaccid)     RLE   RLE : Exceptions to WFL  AROM RLE (degrees)  RLE AROM Comment: Appears to have trace R hip extension though no other volitional movement in R LE. PROM WFL  Strength RLE  RLE Overall Strength: Deficits  R Hip Flexion: 0/5  R Hip Extension: 1/5  R Knee Flexion: 0/5  R Knee Extension: 0/5  R Ankle Dorsiflexion: 0/5  R Ankle Plantar Flexion: 0/5  LLE   LLE : Within Functional Limits    Treatments:  Therapeutic Exercise  Therapeutic Exercise Performed: Yes  Therapeutic Exercise Activity 1: Sup: PROM R shoulder flexion x8, R elbow flexion/extension in gravity eliminated position x8, R wrist flexion/ext x5, and R finger flex/ext x5  Therapeutic Exercise Activity 2: Sup: PROM R heel slides x10, R hip abd x10, passive R gastroc  stretch 3x20 sec    Therapeutic Activity  Therapeutic Activity Performed: Yes  Therapeutic Activity 1: Skillful monitoring of vitals throughout session to ensure safety throughout functional mobility.  Therapeutic Activity 2: Pt sat EOB ~10 min total with CGAx1 for static sitting initially though with increased duration pt endorsing fatigue and dizziness requiring minAx1. R UE propped on pillows  Therapeutic Activity 3: Pt engaged in L UE reaching activities while seated EOB, x8 reps at various distances. CGAx1 for sitting balance. Cues to regain midline between each rep    Outcome Measures:  [facility] Basic Mobility  Turning from your back to your side while in a flat bed without using bedrails: A lot  Moving from lying on your back to sitting on the side of a flat bed without using bedrails: A lot  Moving to and from bed to chair (including a wheelchair): Total  Standing up from a chair using your arms (e.g. wheelchair or bedside chair): Total  To walk in hospital room: Total  Climbing 3-5 steps with railing: Total  Basic Mobility - Total Score: 8    Encounter Problems       Encounter Problems (Active)       Balance       Patient will complete static and dynamic sitting balance tasks with SUP to improve upright posture, core activation, and proximal stability.        Start:  09/22/24    Expected End:  10/06/24            Patient to demo static standing with unilateral UE support, performing single UE task with minAx1, no sway or LOB x 2 mins for functional carryover        Start:  09/22/24    Expected End:  10/06/24               Mobility       STG - Patient will ambulate >/= 10 ft with minAx1 and LRAD       Start:  09/22/24    Expected End:  10/06/24            Pt. will tolerate >/= 10 minutes of OOB mobility without seated rest break and VSS to demo improved activity tolerance/endurance.        Start:  09/22/24    Expected End:  10/06/24               PT Transfers       STG - Patient will perform bed mobility with  CGAx1       Start:  09/22/24    Expected End:  10/06/24            STG - Patient will transfer sit <> stand and bed<>chair with minAx1 and LRAD       Start:  09/22/24    Expected End:  10/06/24                   Education Documentation  Body Mechanics, taught by Cely Morrison, PT at 9/22/2024 11:20 AM.  Learner: Patient  Readiness: Acceptance  Method: Explanation, Demonstration  Response: Verbalizes Understanding, Demonstrated Understanding    Precautions, taught by Cely Morrison, PT at 9/22/2024 11:20 AM.  Learner: Patient  Readiness: Acceptance  Method: Explanation, Demonstration  Response: Verbalizes Understanding, Demonstrated Understanding    Mobility Training, taught by Cely Morrison, PT at 9/22/2024 11:20 AM.  Learner: Patient  Readiness: Acceptance  Method: Explanation, Demonstration  Response: Verbalizes Understanding, Demonstrated Understanding    Education Comments  No comments found.        09/22/24 at 11:24 AM - Cely Morrison, PT

## 2024-09-23 ENCOUNTER — APPOINTMENT (OUTPATIENT)
Dept: CARDIOLOGY | Facility: HOSPITAL | Age: 59
End: 2024-09-23
Payer: COMMERCIAL

## 2024-09-23 VITALS
BODY MASS INDEX: 27.28 KG/M2 | WEIGHT: 180 LBS | SYSTOLIC BLOOD PRESSURE: 158 MMHG | DIASTOLIC BLOOD PRESSURE: 135 MMHG | TEMPERATURE: 98.8 F | HEART RATE: 81 BPM | OXYGEN SATURATION: 97 % | HEIGHT: 68 IN

## 2024-09-23 LAB
AORTIC VALVE MEAN GRADIENT: 4 MMHG
AORTIC VALVE PEAK VELOCITY: 1.46 M/S
AV PEAK GRADIENT: 8.5 MMHG
AVA (PEAK VEL): 1.99 CM2
AVA (VTI): 2.24 CM2
EJECTION FRACTION APICAL 4 CHAMBER: 73.9
EJECTION FRACTION: 69 %
GLUCOSE BLD MANUAL STRIP-MCNC: 152 MG/DL (ref 74–99)
GLUCOSE BLD MANUAL STRIP-MCNC: 193 MG/DL (ref 74–99)
GLUCOSE BLD MANUAL STRIP-MCNC: 214 MG/DL (ref 74–99)
GLUCOSE BLD MANUAL STRIP-MCNC: 249 MG/DL (ref 74–99)
LEFT ATRIUM VOLUME AREA LENGTH INDEX BSA: 19.2 ML/M2
LEFT VENTRICLE INTERNAL DIMENSION DIASTOLE: 3.9 CM (ref 3.5–6)
LEFT VENTRICULAR OUTFLOW TRACT DIAMETER: 1.8 CM
RIGHT VENTRICLE FREE WALL PEAK S': 12.4 CM/S
TRICUSPID ANNULAR PLANE SYSTOLIC EXCURSION: 2.2 CM

## 2024-09-23 PROCEDURE — 2500000001 HC RX 250 WO HCPCS SELF ADMINISTERED DRUGS (ALT 637 FOR MEDICARE OP)

## 2024-09-23 PROCEDURE — 99232 SBSQ HOSP IP/OBS MODERATE 35: CPT

## 2024-09-23 PROCEDURE — 2500000004 HC RX 250 GENERAL PHARMACY W/ HCPCS (ALT 636 FOR OP/ED)

## 2024-09-23 PROCEDURE — 1210000001 HC SEMI-PRIVATE ROOM DAILY

## 2024-09-23 PROCEDURE — 93306 TTE W/DOPPLER COMPLETE: CPT

## 2024-09-23 PROCEDURE — 93306 TTE W/DOPPLER COMPLETE: CPT | Performed by: INTERNAL MEDICINE

## 2024-09-23 PROCEDURE — 2500000002 HC RX 250 W HCPCS SELF ADMINISTERED DRUGS (ALT 637 FOR MEDICARE OP, ALT 636 FOR OP/ED)

## 2024-09-23 PROCEDURE — 82947 ASSAY GLUCOSE BLOOD QUANT: CPT

## 2024-09-23 RX ORDER — HYDRALAZINE HYDROCHLORIDE 20 MG/ML
10 INJECTION INTRAMUSCULAR; INTRAVENOUS
Status: DISCONTINUED | OUTPATIENT
Start: 2024-09-23 | End: 2024-09-23

## 2024-09-23 RX ORDER — LABETALOL HYDROCHLORIDE 5 MG/ML
10 INJECTION, SOLUTION INTRAVENOUS EVERY 10 MIN PRN
Status: ACTIVE | OUTPATIENT
Start: 2024-09-23 | End: 2024-09-23

## 2024-09-23 RX ORDER — HYDROCHLOROTHIAZIDE 25 MG/1
25 TABLET ORAL DAILY
Status: DISCONTINUED | OUTPATIENT
Start: 2024-09-23 | End: 2024-09-25

## 2024-09-23 RX ORDER — ACETAMINOPHEN 500 MG
5 TABLET ORAL DAILY
Status: DISCONTINUED | OUTPATIENT
Start: 2024-09-23 | End: 2024-09-27 | Stop reason: HOSPADM

## 2024-09-23 RX ORDER — HYDRALAZINE HYDROCHLORIDE 25 MG/1
25 TABLET, FILM COATED ORAL EVERY 6 HOURS PRN
Status: DISCONTINUED | OUTPATIENT
Start: 2024-09-23 | End: 2024-09-24

## 2024-09-23 RX ORDER — LISINOPRIL 20 MG/1
40 TABLET ORAL DAILY
Status: DISCONTINUED | OUTPATIENT
Start: 2024-09-23 | End: 2024-09-27 | Stop reason: HOSPADM

## 2024-09-23 RX ADMIN — HYDRALAZINE HYDROCHLORIDE 25 MG: 25 TABLET ORAL at 19:47

## 2024-09-23 RX ADMIN — ENOXAPARIN SODIUM 40 MG: 40 INJECTION SUBCUTANEOUS at 20:37

## 2024-09-23 RX ADMIN — Medication 5 MG: at 01:36

## 2024-09-23 RX ADMIN — INSULIN GLARGINE 15 UNITS: 100 INJECTION, SOLUTION SUBCUTANEOUS at 20:35

## 2024-09-23 RX ADMIN — ATORVASTATIN CALCIUM 80 MG: 80 TABLET, FILM COATED ORAL at 20:37

## 2024-09-23 RX ADMIN — INSULIN LISPRO 2 UNITS: 100 INJECTION, SOLUTION INTRAVENOUS; SUBCUTANEOUS at 08:04

## 2024-09-23 RX ADMIN — ASPIRIN 81 MG 81 MG: 81 TABLET ORAL at 13:20

## 2024-09-23 RX ADMIN — INSULIN LISPRO 2 UNITS: 100 INJECTION, SOLUTION INTRAVENOUS; SUBCUTANEOUS at 17:18

## 2024-09-23 RX ADMIN — ICOSAPENT ETHYL 2 G: 1 CAPSULE ORAL at 17:18

## 2024-09-23 RX ADMIN — Medication 5 MG: at 17:18

## 2024-09-23 RX ADMIN — LISINOPRIL 40 MG: 20 TABLET ORAL at 13:12

## 2024-09-23 NOTE — PROGRESS NOTES
"Valentina Fontanez is a 59 y.o. female on day 2 of admission presenting with Acute ischemic stroke (Multi).    Subjective   Interval Events: Pt was very upset overnight and left in CDU hallway  AM: Denies any concerns.     Objective     Last Recorded Vitals  Blood pressure (!) 198/114, pulse 72, temperature 37.1 °C (98.8 °F), temperature source Temporal, resp. rate (!) 7, height 1.737 m (5' 8.4\"), weight 81.6 kg (180 lb), SpO2 94%.    Heart Rate:  [63-87] 72  Respirations:  [5-26] 7  BP: (148-210)/() 198/114      Physical Exam  Neurological Exam    Physical Exam  HENT:      Head: Normocephalic. Atraumatic.  Eyes:      Extraocular Movements: Extraocular movements intact.      Pupils: Pupils are equal, round, and reactive to light.   Cardiovascular:      Rate and Rhythm: Normal rate.   Pulmonary:      Effort: Pulmonary effort is normal.   Musculoskeletal:      Cervical back: Full passive range of motion without pain.   Skin:     General: Skin is warm and dry.     Mental Status  Alert. Oriented to person, place, and time. Mild dysarthria present. Able to follow complex commands.     Cranial Nerves  CN II: Right visual acuity: Counts fingers. Left visual acuity: Counts fingers.  CN III, IV, VI: Extraocular movements intact bilaterally. Pupils equal round and reactive to light bilaterally.  CN V: Facial sensation is normal on the left. Reports decreased sensation over right side of face.  CN VII:   Right: There is central facial weakness.  Left: There is no facial weakness.  CN VIII: Intact to conversation.     Motor  Normal muscle bulk throughout. Normal muscle tone. The following abnormal movements were seen: Intention tremor with finger to nose. Strength grossly intact on left. No effort versus gravity of RUE. Effort versus gravity of RLE..     Sensory  Light touch abnormality: Reports decreased sensation over right extremities.        Relevant Results    NIH Stroke Scale  1A. Level of Consciousness: Alert, Keenly " Responsive  1B. Ask Month and Age: Both Questions Right  1C. Blink Eyes & Squeeze Hands: Performs Both Tasks  2. Best Gaze: Normal  3. Visual: No Visual Loss  4. Facial Palsy: Partial Paralysis  5A. Motor - Left Arm: No Drift  5B. Motor - Right Arm: No Effort Against Gravity  6A. Motor - Left Leg: No Drift  6B. Motor - Right Leg: No Effort Against Hal[place]  7. Limb Ataxia: Absent  8. Sensory Loss: Mild-to-Moderate Sensory Loss  9. Best Language: No Aphasia  10. Dysarthria: Normal  11. Extinction and Inattention: No Abnormality  NIH Stroke Scale: 9           Yair Coma Scale  Best Eye Response: Spontaneous  Best Verbal Response: Oriented  Best Motor Response: Follows commands  Yair Coma Scale Score: 15          Results from last 72 hours   Lab Units 09/21/24  1217   WBC AUTO x10*3/uL 11.1   NRBC AUTO /100 WBCs 0.0   RBC AUTO x10*6/uL 4.65   HEMOGLOBIN g/dL 13.8   HEMATOCRIT % 39.3   MCV fL 85   MCH pg 29.7   MCHC g/dL 35.1   RDW % 12.2   PLATELETS AUTO x10*3/uL 296   NEUTROS PCT AUTO % 66.8   IG PCT AUTO % 0.2   LYMPHS PCT AUTO % 25.5   MONOS PCT AUTO % 6.7   EOS PCT AUTO % 0.5   BASOS PCT AUTO % 0.3   NEUTROS ABS x10*3/uL 7.42   IG AUTO x10*3/uL 0.02   LYMPHS ABS AUTO x10*3/uL 2.84   MONOS ABS AUTO x10*3/uL 0.75   EOS ABS AUTO x10*3/uL 0.06   BASOS ABS AUTO x10*3/uL 0.03      Results from last 72 hours   Lab Units 09/21/24  1217   GLUCOSE mg/dL 225*   SODIUM mmol/L 140   POTASSIUM mmol/L 3.3*   CHLORIDE mmol/L 102   CO2 mmol/L 25   ANION GAP mmol/L 16   BUN mg/dL 11   CREATININE mg/dL 0.75   EGFR mL/min/1.73m*2 >90   CALCIUM mg/dL 9.7   ALBUMIN g/dL 4.3          Results for orders placed or performed during the hospital encounter of 09/21/24 (from the past 24 hour(s))   POCT GLUCOSE   Result Value Ref Range    POCT Glucose 197 (H) 74 - 99 mg/dL   POCT GLUCOSE   Result Value Ref Range    POCT Glucose 269 (H) 74 - 99 mg/dL   POCT GLUCOSE   Result Value Ref Range    POCT Glucose 180 (H) 74 - 99 mg/dL   POCT GLUCOSE    Result Value Ref Range    POCT Glucose 236 (H) 74 - 99 mg/dL         Assessment/Plan      Assessment & Plan  Acute ischemic stroke (Multi)    Valentina Fontanez is a 59-year-old female with PMH of HTN, T2DM, acute viral meningitis with a generalized tonic-clonic seizure, and smoking history (~1PPD for 10 years, quit 7 years ago) who presented with right-sided weakness and slurred speech with LKW night prior around 2100. NIHSS 10 (LOCq 1, Facial paresis 2, RUE No effort 3, RLE Effort versus gravity 2, Dysarthria 1, Sensory 1). CT Head with prior left lacunar infarction and CT Angio was unremarkable. MRI Brain Stroke Protocol showed an acute to subacute left corona radiata ischemic infarction (volume 1.5 mL (cm³). She was not a candidate for TNK because outside of the window and no mechanical thrombectomy because no LVO. Her hemoglobin A1c 8.1%. LDL was unable to be calculated in setting of hyper-triglyceridemia (498) so started on omega-3 fatty acids. Urine toxicology positive for cocaine. Etiology is small-vessel disease given risk factors superimposed with vasoconstriction from cocaine. She was started on Aspirin and Atorvastatin for secondary stroke prevention. She is admitted to Stroke Neurology to complete Stroke work-up.     Stroke Classification:  Type: Ischemic stroke  Subtype/etiology: Small vessel with vasoconstriction  Vessels involved: Lenticulostriate   Neurological manifestations:  Pre-Intervention Deficits: NIHSS 10 (LOCq 1, Facial paresis 2, RUE No effort 3, RLE Effort versus gravity 2, Dysarthria 1, Sensory 1)  Pre-stroke mRS: 0  Initial treatment: Aspirin 81 mg  Anti-platelets or Anti-coagulation management: Aspirin  Vascular Risk Factors: HTN, T2DM, previous smoker  Antiplatelet/antithrombotic plan for stroke prevention: Will consider DAPT  VTE prophylaxis: Lovenox 40 mg daily  ----------  Update 09/23/24  :: S/P permissive hypertension for 2 days  - Restart lisinopril 40mg today, goal   - POCT  in the higher range, receive 3 units aspart yesterday, will follow  - Pending TTE    Dispo: High intensity  ---------     #Left corona radiata infarction  :: Hemoglobin A1c 8.1%, LDL unable to be calculated, Urine Toxicology cocaine-positive  :: Triglycerides: 498  :: EKG: NSR  - Admit to floor with Telemetry  - Aspirin 81 mg daily  - Atorvastatin 40 mg nightly  - Started omega 3 for elevated triglycerides.  - TTE w/ bubble study  - PT & OT evaluation     #HTN  :: Permissive Hypertension < 220 for 24-48 hours (9/21-9/22)  - PRN Hydralazine and Labetolol  - Hold home lisinopril, Coreg, and HCTZ   - Restart home lisinopril 40mg 9/23, goal      #T2DM  - Lantus 15U nightly (home Lantus 25U nightly)  - SSI with POCT BG  - Urinalysis with 3+ protein consistent with diabetic nephropathy: Will consider starting ACE-Inhibitor once off permissive hypertension     FENGI  Diet: Adult diet Regular, Consistent Carb, Cardiac; CCD 60 gm/meal; Thin 0; 70 gm fat; 2 - 3 grams Sodium  Bowel Regimen: MiraLAX 17g daily     Vascular Risk Factor modification goals:  Blood pressure goals: avoid hypotension SBP <100 that could worsen cerebral perfusion, Ischemic stroke- early permissive hypertension SBP < 220 mmHg with cautious inpatient lowering  Lipid Goals: education on healthy diet and statin therapy to maintain or achieve goal LDL-cholesterol < 70mg  Glucose Goals: early treatment of hyperglycemia to goal glucose 140-180 mg/dl with long-term goal A1c < 7%   Smoking Cessation and Education  Assessment for Rehabilitation needs   Patient and family education on signs and symptoms of stroke, calling 911, healthy strategies for stroke prevention.    CODE STATUS: Full Code (Confirmed on admission)  Neck of Kin: Aparna Fontanez (Mother): [phone]      Robbi Quigley MD  Neurology PGY-2

## 2024-09-23 NOTE — PROGRESS NOTES
Valentina Fontanez is a 59 y.o. female on day 2 of admission presenting with Acute ischemic stroke (Multi).    MAUREEN met with patient at bedside to discuss dc planning. Patient rec'd AR. MAUREEN built and sent referral to [facility] to assist with discharge planning needs.

## 2024-09-23 NOTE — PROGRESS NOTES
Physical Therapy     Therapy Communication Note    Patient Name: Valentina Fontanez  MRN: [id number]  Department: [facility]  Room: TR02B/TR02B  Today's Date: 9/23/2024     Discipline: Physical Therapy    Missed Visit Reason: Missed Visit Reason: Patient placed on medical hold, Other (Comment) (Patients supine /122 with a MAP of 143, Re-took BP  rishabh 189/104 with a MAP of 129. Not medically safe to complete therapy session. RN alerted and notified of patients BP.)    Missed Time: Attempt 11:45am     09/23/24 at 1:17 PM - Anitra Fair PT

## 2024-09-24 LAB
GLUCOSE BLD MANUAL STRIP-MCNC: 179 MG/DL (ref 74–99)
GLUCOSE BLD MANUAL STRIP-MCNC: 206 MG/DL (ref 74–99)
GLUCOSE BLD MANUAL STRIP-MCNC: 210 MG/DL (ref 74–99)
GLUCOSE BLD MANUAL STRIP-MCNC: 224 MG/DL (ref 74–99)

## 2024-09-24 PROCEDURE — 2500000001 HC RX 250 WO HCPCS SELF ADMINISTERED DRUGS (ALT 637 FOR MEDICARE OP)

## 2024-09-24 PROCEDURE — 1100000001 HC PRIVATE ROOM DAILY

## 2024-09-24 PROCEDURE — 97530 THERAPEUTIC ACTIVITIES: CPT | Mod: GP | Performed by: PHYSICAL THERAPIST

## 2024-09-24 PROCEDURE — 2500000004 HC RX 250 GENERAL PHARMACY W/ HCPCS (ALT 636 FOR OP/ED)

## 2024-09-24 PROCEDURE — 2500000002 HC RX 250 W HCPCS SELF ADMINISTERED DRUGS (ALT 637 FOR MEDICARE OP, ALT 636 FOR OP/ED)

## 2024-09-24 PROCEDURE — 99233 SBSQ HOSP IP/OBS HIGH 50: CPT

## 2024-09-24 PROCEDURE — 97110 THERAPEUTIC EXERCISES: CPT | Mod: GP | Performed by: PHYSICAL THERAPIST

## 2024-09-24 PROCEDURE — 82947 ASSAY GLUCOSE BLOOD QUANT: CPT

## 2024-09-24 RX ORDER — AMLODIPINE BESYLATE 5 MG/1
5 TABLET ORAL DAILY
Status: DISCONTINUED | OUTPATIENT
Start: 2024-09-24 | End: 2024-09-24

## 2024-09-24 RX ORDER — LISINOPRIL 20 MG/1
40 TABLET ORAL ONCE
Status: COMPLETED | OUTPATIENT
Start: 2024-09-24 | End: 2024-09-24

## 2024-09-24 RX ORDER — HYDRALAZINE HYDROCHLORIDE 20 MG/ML
10 INJECTION INTRAMUSCULAR; INTRAVENOUS
Status: DISCONTINUED | OUTPATIENT
Start: 2024-09-24 | End: 2024-09-27 | Stop reason: HOSPADM

## 2024-09-24 RX ORDER — HYDRALAZINE HYDROCHLORIDE 10 MG/1
10 TABLET, FILM COATED ORAL EVERY 8 HOURS
Status: DISCONTINUED | OUTPATIENT
Start: 2024-09-24 | End: 2024-09-24

## 2024-09-24 RX ORDER — LABETALOL HYDROCHLORIDE 5 MG/ML
20 INJECTION, SOLUTION INTRAVENOUS
Status: DISCONTINUED | OUTPATIENT
Start: 2024-09-24 | End: 2024-09-27 | Stop reason: HOSPADM

## 2024-09-24 RX ORDER — LABETALOL HYDROCHLORIDE 5 MG/ML
INJECTION, SOLUTION INTRAVENOUS
Status: COMPLETED
Start: 2024-09-24 | End: 2024-09-24

## 2024-09-24 RX ADMIN — LISINOPRIL 40 MG: 20 TABLET ORAL at 04:46

## 2024-09-24 RX ADMIN — LABETALOL HYDROCHLORIDE 20 MG: 5 INJECTION, SOLUTION INTRAVENOUS at 04:46

## 2024-09-24 RX ADMIN — HYDROCHLOROTHIAZIDE 25 MG: 25 TABLET ORAL at 08:41

## 2024-09-24 RX ADMIN — ICOSAPENT ETHYL 2 G: 1 CAPSULE ORAL at 20:34

## 2024-09-24 RX ADMIN — HYDRALAZINE HYDROCHLORIDE 10 MG: 20 INJECTION INTRAMUSCULAR; INTRAVENOUS at 06:42

## 2024-09-24 RX ADMIN — Medication 5 MG: at 18:28

## 2024-09-24 RX ADMIN — ENOXAPARIN SODIUM 40 MG: 40 INJECTION SUBCUTANEOUS at 20:34

## 2024-09-24 RX ADMIN — INSULIN GLARGINE 15 UNITS: 100 INJECTION, SOLUTION SUBCUTANEOUS at 20:41

## 2024-09-24 RX ADMIN — HYDRALAZINE HYDROCHLORIDE 10 MG: 20 INJECTION INTRAMUSCULAR; INTRAVENOUS at 23:16

## 2024-09-24 RX ADMIN — ASPIRIN 81 MG 81 MG: 81 TABLET ORAL at 08:41

## 2024-09-24 RX ADMIN — ATORVASTATIN CALCIUM 80 MG: 80 TABLET, FILM COATED ORAL at 20:34

## 2024-09-24 RX ADMIN — ICOSAPENT ETHYL 2 G: 1 CAPSULE ORAL at 08:41

## 2024-09-24 RX ADMIN — INSULIN LISPRO 2 UNITS: 100 INJECTION, SOLUTION INTRAVENOUS; SUBCUTANEOUS at 17:43

## 2024-09-24 RX ADMIN — INSULIN LISPRO 2 UNITS: 100 INJECTION, SOLUTION INTRAVENOUS; SUBCUTANEOUS at 08:40

## 2024-09-24 RX ADMIN — INSULIN LISPRO 2 UNITS: 100 INJECTION, SOLUTION INTRAVENOUS; SUBCUTANEOUS at 14:16

## 2024-09-24 SDOH — SOCIAL STABILITY: SOCIAL INSECURITY: HAVE YOU HAD ANY THOUGHTS OF HARMING ANYONE ELSE?: NO

## 2024-09-24 SDOH — SOCIAL STABILITY: SOCIAL INSECURITY: ARE YOU OR HAVE YOU BEEN THREATENED OR ABUSED PHYSICALLY, EMOTIONALLY, OR SEXUALLY BY ANYONE?: NO

## 2024-09-24 SDOH — SOCIAL STABILITY: SOCIAL INSECURITY: ABUSE: ADULT

## 2024-09-24 SDOH — SOCIAL STABILITY: SOCIAL INSECURITY: DO YOU FEEL UNSAFE GOING BACK TO THE PLACE WHERE YOU ARE LIVING?: NO

## 2024-09-24 SDOH — SOCIAL STABILITY: SOCIAL INSECURITY: DOES ANYONE TRY TO KEEP YOU FROM HAVING/CONTACTING OTHER FRIENDS OR DOING THINGS OUTSIDE YOUR HOME?: NO

## 2024-09-24 SDOH — SOCIAL STABILITY: SOCIAL INSECURITY: HAVE YOU HAD THOUGHTS OF HARMING ANYONE ELSE?: NO

## 2024-09-24 SDOH — SOCIAL STABILITY: SOCIAL INSECURITY: DO YOU FEEL ANYONE HAS EXPLOITED OR TAKEN ADVANTAGE OF YOU FINANCIALLY OR OF YOUR PERSONAL PROPERTY?: NO

## 2024-09-24 SDOH — SOCIAL STABILITY: SOCIAL INSECURITY: ARE THERE ANY APPARENT SIGNS OF INJURIES/BEHAVIORS THAT COULD BE RELATED TO ABUSE/NEGLECT?: NO

## 2024-09-24 SDOH — SOCIAL STABILITY: SOCIAL INSECURITY: HAS ANYONE EVER THREATENED TO HURT YOUR FAMILY OR YOUR PETS?: NO

## 2024-09-24 ASSESSMENT — LIFESTYLE VARIABLES
HOW OFTEN DO YOU HAVE A DRINK CONTAINING ALCOHOL: 2-4 TIMES A MONTH
AUDIT-C TOTAL SCORE: 3
SKIP TO QUESTIONS 9-10: 0
HOW MANY STANDARD DRINKS CONTAINING ALCOHOL DO YOU HAVE ON A TYPICAL DAY: 3 OR 4
AUDIT-C TOTAL SCORE: 3
HOW OFTEN DO YOU HAVE 6 OR MORE DRINKS ON ONE OCCASION: NEVER

## 2024-09-24 ASSESSMENT — COGNITIVE AND FUNCTIONAL STATUS - GENERAL
WALKING IN HOSPITAL ROOM: TOTAL
MOVING FROM LYING ON BACK TO SITTING ON SIDE OF FLAT BED WITH BEDRAILS: TOTAL
TURNING FROM BACK TO SIDE WHILE IN FLAT BAD: TOTAL
STANDING UP FROM CHAIR USING ARMS: TOTAL
MOBILITY SCORE: 6
CLIMB 3 TO 5 STEPS WITH RAILING: TOTAL
MOVING TO AND FROM BED TO CHAIR: TOTAL
PATIENT BASELINE BEDBOUND: NO

## 2024-09-24 ASSESSMENT — ACTIVITIES OF DAILY LIVING (ADL)
JUDGMENT_ADEQUATE_SAFELY_COMPLETE_DAILY_ACTIVITIES: YES
PATIENT'S MEMORY ADEQUATE TO SAFELY COMPLETE DAILY ACTIVITIES?: YES
ADEQUATE_TO_COMPLETE_ADL: YES
LACK_OF_TRANSPORTATION: NO

## 2024-09-24 ASSESSMENT — PATIENT HEALTH QUESTIONNAIRE - PHQ9
SUM OF ALL RESPONSES TO PHQ9 QUESTIONS 1 & 2: 0
2. FEELING DOWN, DEPRESSED OR HOPELESS: NOT AT ALL
1. LITTLE INTEREST OR PLEASURE IN DOING THINGS: NOT AT ALL

## 2024-09-24 ASSESSMENT — PAIN SCALES - GENERAL: PAINLEVEL_OUTOF10: 0 - NO PAIN

## 2024-09-24 ASSESSMENT — PAIN - FUNCTIONAL ASSESSMENT: PAIN_FUNCTIONAL_ASSESSMENT: 0-10

## 2024-09-24 NOTE — PROGRESS NOTES
Physical Therapy    Physical Therapy Treatment    Patient Name: Valentina Fontanez  MRN: [id number]  [place] [facility]  Room: TR02B/TR02B  Today's Date: 9/24/2024  Time Calculation  Start Time: 1304  Stop Time: 1328  Time Calculation (min): 24 min       Assessment/Plan   PT Assessment  PT Assessment Results: Decreased strength, Decreased range of motion, Decreased endurance, Impaired balance, Decreased mobility, Decreased coordination  Rehab Prognosis: Good  End of Session Communication: Bedside nurse (RN notified of pt elevated BP levels)  End of Session Patient Position: Bed, 2 rail up, Alarm on (ED bed. Sitter present)  PT Plan  Inpatient/Swing Bed or Outpatient: Inpatient  PT Plan  Treatment/Interventions: Bed mobility, Transfer training, Gait training, Stair training, Balance training, Strengthening, Endurance training, Range of motion, Therapeutic exercise, Therapeutic activity  PT Plan: Ongoing PT  PT Frequency: 5 times per week  PT Discharge Recommendations: High intensity level of continued care  Equipment Recommended upon Discharge:  (TBD at Rehab)  PT Recommended Transfer Status: Assist x2  PT - OK to Discharge: Yes    General Visit Information:   PT  Visit  PT Received On: 09/24/24  General  Reason for Referral: Presented to ED w/ R-sided weakness, slurred speech that started around 0400 that morning, fell when she woke up; denies any LOC. Reports feeling well when she went to bed around 2100 prior night. CT Head with prior left lacunar infarction, CT Angio was unremarkable. MRI Brain: acute to subacute L corona radiata ischemic infarction. Outside of window for TNK. NIHSS 10.  Past Medical History Relevant to Rehab: HTN, T2DM, acute viral meningitis with a generalized tonic-clonic seizures, and smoking history (~1PPD for 10 years, quit 7 years ago)  Family/Caregiver Present: Yes (Sitter present)  Prior to Session Communication: Bedside nurse  Patient Position Received: Bed, 2 rail up, Alarm on (ED  bed)  Preferred Learning Style: verbal, visual  General Comment: Pt supine in bed with HOB elevated slightly upon arrival. Pt pleasant and agreeable to participate in PT treatment session. RN cleared pt to participate.    Subjective   Precautions:  Precautions  Hearing/Visual Limitations: Pt denies any blurry or double vision  Medical Precautions: Fall precautions, Seizure precautions  Precautions Comment: SBP <180    Vital Signs (Past 2hrs)        Date/Time Vitals Session Patient Position Pulse Resp SpO2 BP MAP (mmHg)    09/24/24 1304 Pre PT  Lying  70  --  --  173/97  --     09/24/24 1305 During PT  Sitting  81  --  --  180/101  --     09/24/24 1306 Post PT  Lying  70  --  --  172/94  --             Objective   Pain:  Pain Assessment  Pain Assessment: 0-10  0-10 (Numeric) Pain Score: 0 - No pain  Cognition:  Cognition  Orientation Level: Oriented X4     Postural Control:  Static Sitting Balance  Static Sitting-Level of Assistance: Minimum assistance  Dynamic Sitting Balance  Dynamic Sitting-Level of Assistance:  ([place] to ModA)    Activity Tolerance:  Activity Tolerance  Endurance: Tolerates 10 - 20 min exercise with multiple rests  Treatments:  Therapeutic Exercise  Therapeutic Exercise Performed: Yes  Therapeutic Exercise Activity 1: Supine: LLE AP 1x10 reps. Seated EOB: LLE AP, abduction 1x10 reps, LAQ 2x10 reps. RLE PROM all joints 1x10 reps. Rest breaks as needed.    Therapeutic Activity  Therapeutic Activity Performed: Yes  Therapeutic Activity 1: Pt tolerated 5 minutes sitting EOB with [place] to ModA. Pt posterior lean and overall posture corrected by verbal and tactile cues. BP taken after 5 minutes sitting EOB. Pt returned to supine secondary to elevated BP values. RN notified.    Bed Mobility  Bed Mobility: Yes  Bed Mobility 1  Bed Mobility 1: Supine to sitting, Sitting to supine  Level of Assistance 1: Moderate assistance, +2, Minimal verbal cues, Minimal tactile cues  Bed Mobility Comments 1: Pt supine to  sit and sit to supine with ModAx2. Verbal and tactile cues for handplacement on bed, technique, and posture.  Bed Mobility 2  Bed Mobility  2: Scooting (towards HOB)  Level of Assistance 2: Dependent, +2  Bed Mobility Comments 2: Pt boosted up in bed dependentx2 with HOB lowered. Pt able to assist with LLE    Ambulation/Gait Training  Ambulation/Gait Training Performed: No  Transfers  Transfer: No    Stairs  Stairs: No    Outcome Measures:  [facility] Basic Mobility  Turning from your back to your side while in a flat bed without using bedrails: Total  Moving from lying on your back to sitting on the side of a flat bed without using bedrails: Total  Moving to and from bed to chair (including a wheelchair): Total  Standing up from a chair using your arms (e.g. wheelchair or bedside chair): Total  To walk in hospital room: Total  Climbing 3-5 steps with railing: Total  Basic Mobility - Total Score: 6    Education Documentation  Body Mechanics, taught by TED Rowan at 9/24/2024  2:17 PM.  Learner: Patient  Readiness: Acceptance  Method: Explanation  Response: Verbalizes Understanding, Needs Reinforcement    Precautions, taught by TED Rowan at 9/24/2024  2:17 PM.  Learner: Patient  Readiness: Acceptance  Method: Explanation  Response: Verbalizes Understanding, Needs Reinforcement    Mobility Training, taught by TED Rowan at 9/24/2024  2:17 PM.  Learner: Patient  Readiness: Acceptance  Method: Explanation  Response: Verbalizes Understanding, Needs Reinforcement    Education Comments  No comments found.      OP EDUCATION:       Encounter Problems       Encounter Problems (Active)       Balance       Patient will complete static and dynamic sitting balance tasks with SUP to improve upright posture, core activation, and proximal stability.  (Progressing)       Start:  09/22/24    Expected End:  10/06/24            Patient to demo static standing with unilateral UE support, performing single UE task  with minAx1, no sway or LOB x 2 mins for functional carryover  (Progressing)       Start:  09/22/24    Expected End:  10/06/24               Mobility       STG - Patient will ambulate >/= 10 ft with minAx1 and LRAD (Progressing)       Start:  09/22/24    Expected End:  10/06/24            Pt. will tolerate >/= 10 minutes of OOB mobility without seated rest break and VSS to demo improved activity tolerance/endurance.  (Progressing)       Start:  09/22/24    Expected End:  10/06/24               PT Transfers       STG - Patient will perform bed mobility with CGAx1 (Progressing)       Start:  09/22/24    Expected End:  10/06/24            STG - Patient will transfer sit <> stand and bed<>chair with minAx1 and LRAD (Progressing)       Start:  09/22/24    Expected End:  10/06/24

## 2024-09-24 NOTE — PROGRESS NOTES
"Valentina Fontanez is a 59 y.o. female on day 3 of admission presenting with Acute ischemic stroke (Multi).    Patient's dispo plan is Acute Rehab. FOC is [facility], referral is sent, team is following for review until patient is stable for transfer. Patient is not formally accepted at this time. Care Transitions team will continue to follow referral.    Patient has moved to inpatient floor. Most recent update on referral:  \"They were still unable to stand patient due to BP, 'BP taken after 5 minutes sitting EOB. Pt returned to supine secondary to elevated BP values.' We will continue to follow. \"      "

## 2024-09-24 NOTE — PROGRESS NOTES
"Valentina Fontanez is a 59 y.o. female on day 3 of admission presenting with Acute ischemic stroke (Multi).    Subjective   Interval Events: Systolics sustained >210. Exam stable. Re-ordering IV PRNs. Giving AM lisinopril dose early. Restarting prescribed hydrochlorothiazide 25 daily. Would consider restarting her prescribed Coreg 12.5 BID if pressures allow. Note that patient was not taking hydrochlorothiazide at home (though they were prescribed). The patient took coreg at home.  AM: Denies any concern.     Objective     Last Recorded Vitals  Blood pressure (!) 180/108, pulse 86, temperature 37.1 °C (98.8 °F), temperature source Temporal, resp. rate (!) 23, height 1.737 m (5' 8.4\"), weight 81.6 kg (180 lb), SpO2 100%.    Heart Rate:  [61-96] 86  Respirations:  [9-23] 23  BP: (160-218)/() 180/108      Physical Exam  Neurological Exam    Physical Exam  HENT:      Head: Normocephalic. Atraumatic.  Eyes:      Extraocular Movements: Extraocular movements intact.      Pupils: Pupils are equal, round, and reactive to light.   Cardiovascular:      Rate and Rhythm: Normal rate.   Pulmonary:      Effort: Pulmonary effort is normal.   Musculoskeletal:      Cervical back: Full passive range of motion without pain.   Skin:     General: Skin is warm and dry.     Mental Status  Alert. Oriented to person, place, and time. Mild dysarthria present. Able to follow complex commands.     Cranial Nerves  CN II: Right visual acuity: Counts fingers. Left visual acuity: Counts fingers.  CN III, IV, VI: Extraocular movements intact bilaterally. Pupils equal round and reactive to light bilaterally.  CN V: Facial sensation is normal on the left. Reports decreased sensation over right side of face.  CN VII:   Right: There is central facial weakness.  Left: There is no facial weakness.  CN VIII: Intact to conversation.     Motor  Normal muscle bulk throughout. Normal muscle tone. The following abnormal movements were seen: Intention tremor " with finger to nose. Strength grossly intact on left. No effort versus gravity of RUE. Effort versus gravity of RLE..     Sensory  Light touch abnormality: Reports decreased sensation over right extremities.      09/24/24 @ 8 AM  NIHSS:   - Level of consciousness: 0 = Alert  - LOC Question: 0 = Both correct  - LOC Commands: 0 = Obeys both  - Best Gaze: 0 = Normal  - Visual Field: 0 = No visual loss  - Facial Paresis: 2 = Complete UMN  - LUE: 0 = No drift; 10 sec  - RUE: 4 = No movement  - LLE: 0 = No drift; 5 sec  - RLE: 2 = Effort vs gravity  - Limb Ataxia: 0 = Absent  - Sensory: 1 = Partial loss  - Best Language: 0 = No aphasia  - Dysarthria: 1 = Mild-moderate  - Neglect: 0 = None  TOTAL: 10            Yair Coma Scale  Best Eye Response: Spontaneous  Best Verbal Response: Oriented  Best Motor Response: Follows commands  Yair Coma Scale Score: 15          Results from last 72 hours   Lab Units 09/21/24  1217   WBC AUTO x10*3/uL 11.1   NRBC AUTO /100 WBCs 0.0   RBC AUTO x10*6/uL 4.65   HEMOGLOBIN g/dL 13.8   HEMATOCRIT % 39.3   MCV fL 85   MCH pg 29.7   MCHC g/dL 35.1   RDW % 12.2   PLATELETS AUTO x10*3/uL 296   NEUTROS PCT AUTO % 66.8   IG PCT AUTO % 0.2   LYMPHS PCT AUTO % 25.5   MONOS PCT AUTO % 6.7   EOS PCT AUTO % 0.5   BASOS PCT AUTO % 0.3   NEUTROS ABS x10*3/uL 7.42   IG AUTO x10*3/uL 0.02   LYMPHS ABS AUTO x10*3/uL 2.84   MONOS ABS AUTO x10*3/uL 0.75   EOS ABS AUTO x10*3/uL 0.06   BASOS ABS AUTO x10*3/uL 0.03      Results from last 72 hours   Lab Units 09/21/24  1217   GLUCOSE mg/dL 225*   SODIUM mmol/L 140   POTASSIUM mmol/L 3.3*   CHLORIDE mmol/L 102   CO2 mmol/L 25   ANION GAP mmol/L 16   BUN mg/dL 11   CREATININE mg/dL 0.75   EGFR mL/min/1.73m*2 >90   CALCIUM mg/dL 9.7   ALBUMIN g/dL 4.3          Results for orders placed or performed during the hospital encounter of 09/21/24 (from the past 24 hour(s))   POCT GLUCOSE   Result Value Ref Range    POCT Glucose 214 (H) 74 - 99 mg/dL   Transthoracic  Echo (TTE) Complete   Result Value Ref Range    AV pk tracey 1.46 m/s    AV mn grad 4.0 mmHg    LVOT diam 1.80 cm    LA vol index A/L 19.2 ml/m2    Tricuspid annular plane systolic excursion 2.2 cm    LV EF 69 %    RV free wall pk S' 12.40 cm/s    LVIDd 3.90 cm    Aortic Valve Area by Continuity of VTI 2.24 cm2    Aortic Valve Area by Continuity of Peak Velocity 1.99 cm2    AV pk grad 8.5 mmHg    LV A4C EF 73.9    POCT GLUCOSE   Result Value Ref Range    POCT Glucose 193 (H) 74 - 99 mg/dL   POCT GLUCOSE   Result Value Ref Range    POCT Glucose 249 (H) 74 - 99 mg/dL   POCT GLUCOSE   Result Value Ref Range    POCT Glucose 152 (H) 74 - 99 mg/dL         Assessment/Plan      Assessment & Plan  Acute ischemic stroke (Multi)    Valentina Fontanez is a 59-year-old female with PMH of HTN, T2DM, acute viral meningitis with a generalized tonic-clonic seizure, and smoking history (~1PPD for 10 years, quit 7 years ago) who presented with right-sided weakness and slurred speech with LKW night prior around 2100. NIHSS 10 (LOCq 1, Facial paresis 2, RUE No effort 3, RLE Effort versus gravity 2, Dysarthria 1, Sensory 1). CT Head with prior left lacunar infarction and CT Angio was unremarkable. MRI Brain Stroke Protocol showed an acute to subacute left corona radiata ischemic infarction (volume 1.5 mL (cm³). She was not a candidate for TNK because outside of the window and no mechanical thrombectomy because no LVO. Her hemoglobin A1c 8.1%. LDL was unable to be calculated in setting of hyper-triglyceridemia (498) so started on omega-3 fatty acids. Urine toxicology positive for cocaine. Etiology is small-vessel disease given risk factors superimposed with vasoconstriction from cocaine. She was started on Aspirin and Atorvastatin for secondary stroke prevention. She is admitted to Stroke Neurology to complete Stroke work-up.     Stroke Classification:  Type: Ischemic stroke  Subtype/etiology: Small vessel with vasoconstriction Vs  cocaines  Vessels involved: Lenticulostriate   Neurological manifestations:  Pre-Intervention Deficits: NIHSS 10 (LOCq 1, Facial paresis 2, RUE No effort 3, RLE Effort versus gravity 2, Dysarthria 1, Sensory 1)  Pre-stroke mRS: 0  Initial treatment: Aspirin 81 mg  Anti-platelets or Anti-coagulation management: Aspirin  Vascular Risk Factors: HTN, T2DM, previous smoker  Antiplatelet/antithrombotic plan for stroke prevention: Will consider DAPT  VTE prophylaxis: Lovenox 40 mg daily  ----------  Update 09/24/24  :: S/P permissive hypertension for 2 days  :: TTE (9/23): LVEF 69%, impaired relaxation pattern of LV diastolic filling, moderate concentric LVH, no PFO, normal LA size  - c/w lisinopril 40mg, hydrochlorothiazide 25mg, goal SBP<180  - Hold coreg due to HR down to 60  - Plan to increase hydrochlorothiazide if needed    To follow up:  - BP control    Dispo: High intensity  ---------     #Left corona radiata infarction  :: Hemoglobin A1c 8.1%, LDL unable to be calculated, Urine Toxicology cocaine-positive  :: Triglycerides: 498  :: EKG: NSR  :: TTE (9/23): LVEF 69%, impaired relaxation pattern of LV diastolic filling, moderate concentric LVH, no PFO, normal LA size  - Admit to floor with Telemetry  - Aspirin 81 mg daily  - Atorvastatin 40 mg nightly  - Started omega 3 for elevated triglycerides.  - PT & OT evaluation     #HTN  :: Permissive Hypertension < 220 for 24-48 hours (9/21-9/22)  - PRN Hydralazine and Labetolol  - Hold home Coreg, and HCTZ   - Restart home lisinopril 40mg 9/23, goal SBP<180  - Restart home hydrochlorothiazide 25mg OD     #T2DM  - Lantus 15U nightly (home Lantus 25U nightly)  - SSI with POCT BG  - Urinalysis with 3+ protein consistent with diabetic nephropathy: Will consider starting ACE-Inhibitor once off permissive hypertension     FENGI  Diet: Adult diet Regular, Consistent Carb, Cardiac; CCD 60 gm/meal; Thin 0; 70 gm fat; 2 - 3 grams Sodium  Bowel Regimen: MiraLAX 17g daily     Vascular  Risk Factor modification goals:  Blood pressure goals: avoid hypotension SBP <100 that could worsen cerebral perfusion, Ischemic stroke- early permissive hypertension SBP < 220 mmHg with cautious inpatient lowering  Lipid Goals: education on healthy diet and statin therapy to maintain or achieve goal LDL-cholesterol < 70mg  Glucose Goals: early treatment of hyperglycemia to goal glucose 140-180 mg/dl with long-term goal A1c < 7%   Smoking Cessation and Education  Assessment for Rehabilitation needs   Patient and family education on signs and symptoms of stroke, calling 911, healthy strategies for stroke prevention.    CODE STATUS: Full Code (Confirmed on admission)  Neck of Kin: Aparna Fontanez (Mother): [phone]      Robbi Quigley MD  Neurology PGY-2

## 2024-09-25 ENCOUNTER — APPOINTMENT (OUTPATIENT)
Dept: CARDIOLOGY | Facility: HOSPITAL | Age: 59
End: 2024-09-25
Payer: COMMERCIAL

## 2024-09-25 LAB
GLUCOSE BLD MANUAL STRIP-MCNC: 177 MG/DL (ref 74–99)
GLUCOSE BLD MANUAL STRIP-MCNC: 178 MG/DL (ref 74–99)
GLUCOSE BLD MANUAL STRIP-MCNC: 227 MG/DL (ref 74–99)
GLUCOSE BLD MANUAL STRIP-MCNC: 261 MG/DL (ref 74–99)

## 2024-09-25 PROCEDURE — 2500000004 HC RX 250 GENERAL PHARMACY W/ HCPCS (ALT 636 FOR OP/ED)

## 2024-09-25 PROCEDURE — 82947 ASSAY GLUCOSE BLOOD QUANT: CPT

## 2024-09-25 PROCEDURE — 99233 SBSQ HOSP IP/OBS HIGH 50: CPT

## 2024-09-25 PROCEDURE — 2500000001 HC RX 250 WO HCPCS SELF ADMINISTERED DRUGS (ALT 637 FOR MEDICARE OP)

## 2024-09-25 PROCEDURE — 2500000002 HC RX 250 W HCPCS SELF ADMINISTERED DRUGS (ALT 637 FOR MEDICARE OP, ALT 636 FOR OP/ED)

## 2024-09-25 PROCEDURE — 97112 NEUROMUSCULAR REEDUCATION: CPT | Mod: GP | Performed by: PHYSICAL THERAPIST

## 2024-09-25 PROCEDURE — 93005 ELECTROCARDIOGRAM TRACING: CPT

## 2024-09-25 PROCEDURE — 1100000001 HC PRIVATE ROOM DAILY

## 2024-09-25 RX ORDER — INSULIN GLARGINE 100 [IU]/ML
18 INJECTION, SOLUTION SUBCUTANEOUS NIGHTLY
Status: DISCONTINUED | OUTPATIENT
Start: 2024-09-25 | End: 2024-09-27 | Stop reason: HOSPADM

## 2024-09-25 RX ORDER — AMOXICILLIN 250 MG
1 CAPSULE ORAL 2 TIMES DAILY
Status: DISCONTINUED | OUTPATIENT
Start: 2024-09-25 | End: 2024-09-27 | Stop reason: HOSPADM

## 2024-09-25 RX ORDER — HYDROCHLOROTHIAZIDE 25 MG/1
25 TABLET ORAL 2 TIMES DAILY
Status: DISCONTINUED | OUTPATIENT
Start: 2024-09-25 | End: 2024-09-27 | Stop reason: HOSPADM

## 2024-09-25 RX ORDER — CARVEDILOL 12.5 MG/1
12.5 TABLET ORAL 2 TIMES DAILY
Status: DISCONTINUED | OUTPATIENT
Start: 2024-09-25 | End: 2024-09-26

## 2024-09-25 RX ORDER — POLYETHYLENE GLYCOL 3350 17 G/17G
17 POWDER, FOR SOLUTION ORAL 2 TIMES DAILY
Status: DISCONTINUED | OUTPATIENT
Start: 2024-09-25 | End: 2024-09-27 | Stop reason: HOSPADM

## 2024-09-25 RX ADMIN — ENOXAPARIN SODIUM 40 MG: 40 INJECTION SUBCUTANEOUS at 20:27

## 2024-09-25 RX ADMIN — Medication 5 MG: at 17:56

## 2024-09-25 RX ADMIN — HYDROCHLOROTHIAZIDE 25 MG: 25 TABLET ORAL at 08:42

## 2024-09-25 RX ADMIN — INSULIN LISPRO 3 UNITS: 100 INJECTION, SOLUTION INTRAVENOUS; SUBCUTANEOUS at 12:48

## 2024-09-25 RX ADMIN — ICOSAPENT ETHYL 2 G: 1 CAPSULE ORAL at 17:56

## 2024-09-25 RX ADMIN — SENNOSIDES AND DOCUSATE SODIUM 1 TABLET: 50; 8.6 TABLET ORAL at 20:27

## 2024-09-25 RX ADMIN — ATORVASTATIN CALCIUM 80 MG: 80 TABLET, FILM COATED ORAL at 20:27

## 2024-09-25 RX ADMIN — INSULIN LISPRO 1 UNITS: 100 INJECTION, SOLUTION INTRAVENOUS; SUBCUTANEOUS at 17:56

## 2024-09-25 RX ADMIN — HYDROCHLOROTHIAZIDE 25 MG: 25 TABLET ORAL at 20:27

## 2024-09-25 RX ADMIN — LISINOPRIL 40 MG: 20 TABLET ORAL at 08:42

## 2024-09-25 RX ADMIN — ASPIRIN 81 MG 81 MG: 81 TABLET ORAL at 08:43

## 2024-09-25 RX ADMIN — SENNOSIDES AND DOCUSATE SODIUM 1 TABLET: 50; 8.6 TABLET ORAL at 14:15

## 2024-09-25 RX ADMIN — POLYETHYLENE GLYCOL 3350 17 G: 17 POWDER, FOR SOLUTION ORAL at 08:42

## 2024-09-25 RX ADMIN — ICOSAPENT ETHYL 2 G: 1 CAPSULE ORAL at 08:42

## 2024-09-25 RX ADMIN — POLYETHYLENE GLYCOL 3350 17 G: 17 POWDER, FOR SOLUTION ORAL at 20:27

## 2024-09-25 RX ADMIN — LABETALOL HYDROCHLORIDE 20 MG: 5 INJECTION, SOLUTION INTRAVENOUS at 23:55

## 2024-09-25 RX ADMIN — INSULIN GLARGINE 18 UNITS: 100 INJECTION, SOLUTION SUBCUTANEOUS at 20:29

## 2024-09-25 RX ADMIN — INSULIN LISPRO 1 UNITS: 100 INJECTION, SOLUTION INTRAVENOUS; SUBCUTANEOUS at 08:42

## 2024-09-25 ASSESSMENT — PAIN SCALES - GENERAL
PAINLEVEL_OUTOF10: 0 - NO PAIN

## 2024-09-25 ASSESSMENT — COGNITIVE AND FUNCTIONAL STATUS - GENERAL
TURNING FROM BACK TO SIDE WHILE IN FLAT BAD: A LOT
EATING MEALS: A LITTLE
DAILY ACTIVITIY SCORE: 13
MOBILITY SCORE: 9
MOVING TO AND FROM BED TO CHAIR: TOTAL
EATING MEALS: A LITTLE
DRESSING REGULAR UPPER BODY CLOTHING: A LOT
HELP NEEDED FOR BATHING: A LOT
DAILY ACTIVITIY SCORE: 13
WALKING IN HOSPITAL ROOM: TOTAL
DRESSING REGULAR LOWER BODY CLOTHING: A LOT
WALKING IN HOSPITAL ROOM: TOTAL
PERSONAL GROOMING: A LOT
CLIMB 3 TO 5 STEPS WITH RAILING: TOTAL
MOVING FROM LYING ON BACK TO SITTING ON SIDE OF FLAT BED WITH BEDRAILS: A LOT
MOVING TO AND FROM BED TO CHAIR: A LOT
DRESSING REGULAR UPPER BODY CLOTHING: A LOT
STANDING UP FROM CHAIR USING ARMS: TOTAL
HELP NEEDED FOR BATHING: A LOT
MOBILITY SCORE: 9
STANDING UP FROM CHAIR USING ARMS: TOTAL
PERSONAL GROOMING: A LOT
TURNING FROM BACK TO SIDE WHILE IN FLAT BAD: A LOT
TURNING FROM BACK TO SIDE WHILE IN FLAT BAD: A LOT
WALKING IN HOSPITAL ROOM: TOTAL
DRESSING REGULAR LOWER BODY CLOTHING: A LOT
CLIMB 3 TO 5 STEPS WITH RAILING: TOTAL
TOILETING: A LOT
DRESSING REGULAR LOWER BODY CLOTHING: A LOT
MOVING FROM LYING ON BACK TO SITTING ON SIDE OF FLAT BED WITH BEDRAILS: A LOT
MOVING FROM LYING ON BACK TO SITTING ON SIDE OF FLAT BED WITH BEDRAILS: A LOT
DAILY ACTIVITIY SCORE: 12
CLIMB 3 TO 5 STEPS WITH RAILING: TOTAL
TOILETING: TOTAL
TOILETING: A LOT
PERSONAL GROOMING: A LOT
MOVING TO AND FROM BED TO CHAIR: A LOT
MOBILITY SCORE: 9
DRESSING REGULAR UPPER BODY CLOTHING: A LOT
HELP NEEDED FOR BATHING: A LOT
WALKING IN HOSPITAL ROOM: TOTAL
MOVING FROM LYING ON BACK TO SITTING ON SIDE OF FLAT BED WITH BEDRAILS: A LITTLE
MOBILITY SCORE: 9
TURNING FROM BACK TO SIDE WHILE IN FLAT BAD: A LOT
CLIMB 3 TO 5 STEPS WITH RAILING: TOTAL
MOVING TO AND FROM BED TO CHAIR: A LOT
EATING MEALS: A LITTLE

## 2024-09-25 ASSESSMENT — PAIN - FUNCTIONAL ASSESSMENT
PAIN_FUNCTIONAL_ASSESSMENT: 0-10

## 2024-09-25 NOTE — PROGRESS NOTES
Physical Therapy    Physical Therapy Treatment    Patient Name: Valentina Fontanez  MRN: [id number]  Department: [facility] [id number]  Room: [address] [place] [address]-  Today's Date: 9/25/2024  Time Calculation  Start Time: 1345  Stop Time: 1355  Time Calculation (min): 10 min  Split session: returned to assist pt back to bed 13:45-13:55        Assessment/Plan   PT Assessment  PT Assessment Results: Decreased strength, Decreased range of motion, Decreased endurance, Impaired balance, Decreased mobility, Decreased coordination, Impaired judgement, Decreased cognition, Decreased safety awareness, Impaired sensation, Impaired tone  Rehab Prognosis: Good  Barriers to Discharge: none noted  Evaluation/Treatment Tolerance: Patient tolerated treatment well  Medical Staff Made Aware: Yes  Strengths: Ability to acquire knowledge, Attitude of self, Coping skills, Premorbid level of function  Barriers to Participation: Other (Comment) (slightly hypertensive)  End of Session Communication: Bedside nurse, Physician (RN and MD aware of HTN)  Assessment Comment: Pt is a 60 yo female with acute to subacute Left corona radiata infarct presents with Right paresis (UE > LE), dysarthria, decreased balance and decreased safety awareness; pt with improved mobility on this date. Recommend high intensity therapy as pt is young and appears very motivated to rehab, is not at baseline, needs all 3 therapies, is making gains and likely has equipment needs.  End of Session Patient Position: Up in chair, Alarm on  PT Plan  Inpatient/Swing Bed or Outpatient: Inpatient  PT Plan  Treatment/Interventions: Bed mobility, Transfer training, Gait training, Balance training, Neuromuscular re-education, Strengthening, Endurance training, Therapeutic exercise, Therapeutic activity, Home exercise program, Positioning, Postural re-education  PT Plan: Ongoing PT  PT Frequency: 5 times per week  PT Discharge Recommendations: High intensity level of continued care  Equipment Recommended upon  Discharge: Other (comment) (TBD at rehab)  PT Recommended Transfer Status: Assist x1 (max A +1 squat pivot bed to chair no device)  PT - OK to Discharge: Yes (PT eval completed & DC recs made)      General Visit Information:   PT  Visit  PT Received On: 09/25/24 (split session: 12:37-13:05pm; went to assist pt back to bed 13:45-13:55)  General  Reason for Referral: Pt presented to ED with right-sided weakness and slurred speech and fall,  NIHSS 10. Dx: acute to subacute left corona radiata ischemic infarct  Past Medical History Relevant to Rehab: HTN, T2DM, acute viral meningitis with a generalized tonic-clonic seizures, and smoking history (~1PPD for 10 years, quit 7 years ago)  Missed Visit: No  Missed Visit Reason:  (.)  Family/Caregiver Present: Yes  Caregiver Feedback: brother arrived at end of first session  Co-Treatment:  (N/A)  Prior to Session Communication: Bedside nurse  Patient Position Received: Bed, 3 rail up, Alarm on  Preferred Learning Style: kinesthetic, verbal  General Comment: Pt supine in bed alert, very engaged in session with RIght paresis, dysarthria as noted. Pt able to transfer to chair as noted and was tearful about her weakness. BP at to of parameter today (RN and MD aware).    Subjective   Precautions:  Precautions  Medical Precautions: Seizure precautions, Fall precautions (DM, Right paresis, decreasd safety awareness, DM, dysarthria)    Vital Signs (Past 2hrs)        Date/Time Vitals Session Patient Position Pulse Resp SpO2 BP MAP (mmHg)               09/25/24 1237 During PT  Lying  89  --  96 %  157/99  --     09/25/24 1238 Post PT  Sitting  92  --  96 %  160/90  --                   Vital Signs Comment: after transfer to chair (RN and MD informed)     Objective   Pain:  Pain Assessment  Pain Assessment: 0-10  0-10 (Numeric) Pain Score: 0 - No pain  Cognition:  Cognition  Arousal/Alertness: Delayed responses to stimuli  Orientation Level:  (oriented x 3)  Following Commands:  (follows  100% of simple 1 step commands with slight delay, intermittent repetition)  Cognition Comments: when asked, she appears to have decreased safety awareness (and per RN report)  Problem Solving: Exceptions to WFL  Safety/Judgement: Exceptions to WFL  Insight: Moderate  Impulsive: Mildly  Processing Speed: Delayed  Coordination:  Movements are Fluid and Coordinated: No (Right LE dyscoordinated with attempts to assess volitional movement; Right UE not moving to command today)  Postural Control:  Postural Control  Postural Control: Impaired  Posture Comment: standing: RIght hip/knee buckling, Right UE requires support  Static Sitting Balance  Static Sitting-Balance Support: Feet supported, Bilateral upper extremity supported  Static Sitting-Level of Assistance: Contact guard (cues for safety)  Dynamic Sitting Balance  Dynamic Sitting-Balance Support: Feet supported, Left upper extremity supported  Dynamic Sitting-Level of Assistance: Moderate assistance  Dynamic Sitting-Balance:  (scoot to EOB)  Dynamic Sitting-Comments: use of Chux  Static Standing Balance  Static Standing-Balance Support: Bilateral upper extremity supported (on PT in front, blocking knee, no device)  Static Standing-Level of Assistance: Maximum assistance (max A initially to assist pt up to chair; mod /max A when returned to assist pt back to bed)  Dynamic Standing Balance  Dynamic Standing-Balance Support: Bilateral upper extremity supported (on PT In front, Right knee blocked, no device)  Dynamic Standing-Level of Assistance: Maximum assistance (max A to get into chair; mod/max A when returned to assist pt back to bed)  Extremity/Trunk Assessments:  RUE   RUE : Exceptions to WFL  RUE Strength  RUE Overall Strength:  (no volitional movement observed on command today)  RUE Tone  RUE Tone: Hypotonic  RLE   RLE : Exceptions to WFL  AROM RLE (degrees)  RLE AROM Comment:  (ankle DF with KE tight end range)  Strength RLE  RLE Overall Strength:  (supine  Right hip add >2-, RIght knee flexion >2- intermittently; sitting Right knee extension >2-)  Tone RLE  RLE Tone:  (hypotonic at rest, slight hypertonicity with attempts to move, no clonus observed)  Activity Tolerance:  Activity Tolerance  Endurance: Other (Comment) (frequent rest breaks and monitoring of BP)  Treatments:  Therapeutic Exercise  Therapeutic Exercise Performed: Yes  Therapeutic Exercise Activity 1: supine AAROM RIght hip adduction, ankle (no movement; PROM), knee flex (AAROM) and extension (PROM); sitting knee ext RIght assist, Left AROM         Bed Mobility  Bed Mobility: Yes  Bed Mobility 1  Bed Mobility 1: Rolling left  Level of Assistance 1: Moderate assistance, Minimal verbal cues, Moderate tactile cues  Bed Mobility Comments 1: use of rail, assist to flex Right UE and place/hold Right UE  Bed Mobility 2  Bed Mobility  2: Side lying left to sit  Level of Assistance 2: Moderate assistance, Moderate tactile cues, Minimal verbal cues  Bed Mobility Comments 2: support Right UE, assist Right LE  Bed Mobility 3  Bed Mobility 3: Scooting (in sitting)  Level of Assistance 3: Moderate verbal cues, Moderate tactile cues, Moderate assistance  Bed Mobility Comments 3: using Chux    Ambulation/Gait Training  Ambulation/Gait Training Performed: No (not yet able)  Transfers  Transfer: Yes  Transfer 1  Transfer From 1: Sit to, Stand to  Transfer to 1: Sit, Stand  Technique 1: Sit to stand, Stand to sit  Transfer Device 1: Gait belt (no device)  Transfer Level of Assistance 1: Maximum assistance, Moderate verbal cues, Moderate tactile cues (max A to get into chair; mod/max when returend and assisted pt to get back to bed)  Trials/Comments 1: blocking RIght knee, supporting Right UE  Transfers 2  Transfer From 2: Stand to, Chair with arms to  Transfer to 2: Chair with arms, Bed  Technique 2: Stand pivot  Transfer Device 2: Gait belt (no device)  Transfer Level of Assistance 2: Maximum assistance, Moderate verbal  cues, Moderate tactile cues (max A initially to get into chair; mod/max A to get back to bed later)  Trials/Comments 2: Right knee blocked (more of a squat pivot due to pts weakness), RIght UE supported    Stairs  Stairs: No (note yet able to do)    Outcome Measures:  [facility] Basic Mobility  Turning from your back to your side while in a flat bed without using bedrails: A little  Moving from lying on your back to sitting on the side of a flat bed without using bedrails: A lot  Moving to and from bed to chair (including a wheelchair): Total  Standing up from a chair using your arms (e.g. wheelchair or bedside chair): Total  To walk in hospital room: Total  Climbing 3-5 steps with railing: Total  Basic Mobility - Total Score: 9    Education Documentation  Body Mechanics, taught by Elsie Montoya PT at 9/25/2024  1:11 PM.  Learner: Patient  Readiness: Eager  Method: Explanation, Demonstration  Response: Needs Reinforcement, Verbalizes Understanding  Comment: reoriented, PT purpose/POC, need for assist with all in-hospital mobility and rationale, progress, safe rolling and coming to sit, sitting balance, safe transfers, vitals    Precautions, taught by Elsie Montoya PT at 9/25/2024  1:11 PM.  Learner: Patient  Readiness: Eager  Method: Explanation, Demonstration  Response: Needs Reinforcement, Verbalizes Understanding  Comment: reoriented, PT purpose/POC, need for assist with all in-hospital mobility and rationale, progress, safe rolling and coming to sit, sitting balance, safe transfers, vitals    Mobility Training, taught by Elsie Montoya PT at 9/25/2024  1:11 PM.  Learner: Patient  Readiness: Eager  Method: Explanation, Demonstration  Response: Needs Reinforcement, Verbalizes Understanding  Comment: reoriented, PT purpose/POC, need for assist with all in-hospital mobility and rationale, progress, safe rolling and coming to sit, sitting balance, safe transfers, vitals    Education Comments  No comments  "found.        Encounter Problems       Encounter Problems (Active)       Balance       Patient will complete static and dynamic sitting balance tasks with SUP to improve upright posture, core activation, and proximal stability.  (Not met)       Start:  09/22/24    Expected End:  10/06/24    Resolved:  09/25/24    Updated to: Patient will reach in sitting with Left UE >8\" out of BUSHRA (laterally either direction and anteriorly with SBA, no LOB)    Update reason: update         Patient to demo static standing with unilateral UE support, performing single UE task with minAx1, no sway or LOB x 2 mins for functional carryover  (Not met)       Start:  09/22/24    Expected End:  10/06/24    Resolved:  09/25/24    Updated to: Patient to stand statically with LRAD and min A, no Right knee buckling >8 minutes with stable BP and no LOB    Update reason: update         Patient to stand statically with LRAD and min A, no Right knee buckling >8 minutes with stable BP and no LOB (Progressing)       Start:  09/25/24    Expected End:  10/09/24                Patient will reach in sitting with Left UE >8\" out of BUSHRA (laterally either direction and anteriorly with SBA, no LOB) (Progressing)       Start:  09/25/24    Expected End:  10/09/24                   Mobility       STG - Patient will ambulate >/= 10 ft with minAx1 and LRAD (Not met)       Start:  09/22/24    Expected End:  10/06/24    Resolved:  09/25/24    Updated to: Patient with ambulate 25' with LRAD and min/mod A (left ankle DF assist with Ace wrap) and chair follow with stable BP    Update reason: update         Pt. will tolerate >/= 10 minutes of OOB mobility without seated rest break and VSS to demo improved activity tolerance/endurance.  (Not met)       Start:  09/22/24    Expected End:  10/06/24    Resolved:  09/25/24    Updated to: Pt. will come supine to/from sit from either side, HOB elevated 45 degrees, use of rail, with supervision safely    Update reason: update "         Pt. will come supine to/from sit from either side, HOB elevated 45 degrees, use of rail, with supervision safely (Progressing)       Start:  09/25/24    Expected End:  10/09/24                Patient with ambulate 25' with LRAD and min/mod A (left ankle DF assist with Ace wrap) and chair follow with stable BP (Not Progressing)       Start:  09/25/24    Expected End:  10/09/24                   PT Transfers       STG - Patient will perform bed mobility with CGAx1 (Not met)       Start:  09/22/24    Expected End:  10/06/24    Resolved:  09/25/24    Updated to: Strength Right ankle DF >3, knee extensors >4- and hip flexors >4-    Update reason: update         STG - Patient will transfer sit <> stand and bed<>chair with minAx1 and LRAD (Not met)       Start:  09/22/24    Expected End:  10/06/24    Resolved:  09/25/24    Updated to: Patient will come sit to/from stand, bed to/from chair with LRAD and Right DF assist Ace wrap with min A, no Right knee buckling/LOB and stable BP    Update reason: update         Strength Right ankle DF >3, knee extensors >4- and hip flexors >4- (Progressing)       Start:  09/25/24    Expected End:  10/09/24                Patient will come sit to/from stand, bed to/from chair with LRAD and Right DF assist Ace wrap with min A, no Right knee buckling/LOB and stable BP (Progressing)       Start:  09/25/24    Expected End:  10/09/24

## 2024-09-25 NOTE — PROGRESS NOTES
09/25/24 1130   Discharge Planning   Living Arrangements Parent   Support Systems Parent   Assistance Needed None   Type of Residence Private residence   Do you have animals or pets at home? No   Who is requesting discharge planning? Provider   Expected Discharge Disposition Rehab   Does the patient need discharge transport arranged? Yes   RoundTrip coordination needed? Yes   Has discharge transport been arranged? No     Previous Home Care: none  DME: cane , walker  Pharmacy: [facility]  PCP:   Dr. Calix    Met with patient and introduced myself as Care Coordinator and member of the discharge planning team.  Spoke to patient about therapies recommendation for high intensith therapy post discharge. Pt says she would like to discharge to her moms home with Home therapy. She does not want to go to a facility. Address is [address] [place] [address]. Care coordinator will continue to follow for home going needs.

## 2024-09-25 NOTE — PROGRESS NOTES
"Valentina Fontanez is a 59 y.o. female on day 4 of admission presenting with Acute ischemic stroke (Multi).    Subjective   Interval Events: NAEON  AM: Denies any concern.   Per RN, has some QT prolongation on the tele, some confusion at night (thinking that she is at home), redirectable     Objective     Last Recorded Vitals  Blood pressure (!) 173/93, pulse 67, temperature 36.4 °C (97.5 °F), resp. rate 18, height 1.737 m (5' 8.4\"), weight 81.6 kg (180 lb), SpO2 93%.    Temp:  [36.4 °C (97.5 °F)-36.6 °C (97.9 °F)] 36.4 °C (97.5 °F)  Heart Rate:  [66-81] 67  Resp:  [8-23] 18  BP: (150-210)/() 173/93 210/108 at 11pm, repeat immediately down to 190/97      Physical Exam  Neurological Exam    Physical Exam  Mental Status  Alert. Oriented to person, place, and time. Mild dysarthria present. Able to follow complex commands.     Cranial Nerves  CN II: Right visual acuity: Counts fingers. Left visual acuity: Counts fingers.  CN III, IV, VI: Extraocular movements intact bilaterally. Pupils equal round and reactive to light bilaterally.  CN V: Facial sensation is normal on the left. Reports decreased sensation over right side of face.  CN VII:   Right: There is central facial weakness.  Left: There is no facial weakness.  CN VIII: Intact to conversation.     Sensory  Light touch abnormality: Reports decreased sensation over right extremities.      09/25/24 @ 8 AM  NIHSS:   - Level of consciousness: 0 = Alert  - LOC Question: 0 = Both correct  - LOC Commands: 0 = Obeys both  - Best Gaze: 0 = Normal  - Visual Field: 0 = No visual loss  - Facial Paresis: 2 = Complete UMN  - LUE: 0 = No drift; 10 sec  - RUE: 4 = No movement  - LLE: 0 = No drift; 5 sec  - RLE: 1 = Drift  - Limb Ataxia: 0 = Absent  - Sensory: 1 = Partial loss  - Best Language: 0 = No aphasia  - Dysarthria: 1 = Mild-moderate  - Neglect: 0 = None  TOTAL: 9          Yair Coma Scale  Best Eye Response: Spontaneous  Best Verbal Response: Oriented  Best Motor " Response: Follows commands  Yair Coma Scale Score: 15          Results for orders placed or performed during the hospital encounter of 09/21/24 (from the past 24 hour(s))   POCT GLUCOSE   Result Value Ref Range    POCT Glucose 206 (H) 74 - 99 mg/dL   POCT GLUCOSE   Result Value Ref Range    POCT Glucose 224 (H) 74 - 99 mg/dL   POCT GLUCOSE   Result Value Ref Range    POCT Glucose 210 (H) 74 - 99 mg/dL   POCT GLUCOSE   Result Value Ref Range    POCT Glucose 179 (H) 74 - 99 mg/dL         Assessment/Plan      Assessment & Plan  Acute ischemic stroke (Multi)    Valentina Fontanez is a 59-year-old female with PMH of HTN, T2DM, acute viral meningitis with a generalized tonic-clonic seizure, and smoking history (~1PPD for 10 years, quit 7 years ago) who presented with right-sided weakness and slurred speech with LKW night prior around 2100. NIHSS 10 (LOCq 1, Facial paresis 2, RUE No effort 3, RLE Effort versus gravity 2, Dysarthria 1, Sensory 1). CT Head with prior left lacunar infarction and CT Angio was unremarkable. MRI Brain Stroke Protocol showed an acute to subacute left corona radiata ischemic infarction (volume 1.5 mL (cm³). She was not a candidate for TNK because outside of the window and no mechanical thrombectomy because no LVO. Her hemoglobin A1c 8.1%. LDL was unable to be calculated in setting of hyper-triglyceridemia (498) so started on omega-3 fatty acids. Urine toxicology positive for cocaine. Etiology is small-vessel disease given risk factors superimposed with vasoconstriction from cocaine. She was started on Aspirin and Atorvastatin for secondary stroke prevention. Now complete stroke work up.     Stroke Classification:  Type: Ischemic stroke  Subtype/etiology: Small vessel with vasoconstriction Vs cocaines  Vessels involved: Lenticulostriate   Neurological manifestations:  Pre-Intervention Deficits: NIHSS 10 (LOCq 1, Facial paresis 2, RUE No effort 3, RLE Effort versus gravity 2, Dysarthria 1, Sensory  1)  Pre-stroke mRS: 0  Initial treatment: Aspirin 81 mg  Anti-platelets or Anti-coagulation management: Aspirin  Vascular Risk Factors: HTN, T2DM, previous smoker  Antiplatelet/antithrombotic plan for stroke prevention: Will consider DAPT  VTE prophylaxis: Lovenox 40 mg daily  ----------  Update 09/25/24  - c/w lisinopril 40mg  - Increase hydrochlorothiazide 25mg to BID  - Goal SBP<180  - Increase glargine to 18 units      To follow up:  - BP control, Plan to increase hydrochlorothiazide if needed    Dispo: High intensity  ---------     #Left corona radiata infarction  :: Hemoglobin A1c 8.1%, LDL unable to be calculated, Urine Toxicology cocaine-positive  :: Triglycerides: 498  :: EKG: NSR  :: TTE (9/23): LVEF 69%, impaired relaxation pattern of LV diastolic filling, moderate concentric LVH, no PFO, normal LA size  - Admit to floor with Telemetry  - Aspirin 81 mg daily  - Atorvastatin 40 mg nightly  - Started omega 3 for elevated triglycerides.  - PT & OT evaluation     #HTN  :: Permissive Hypertension < 220 for 24-48 hours (9/21-9/22)  - PRN Hydralazine and Labetolol  - Hold home Coreg, and HCTZ   - Restart home lisinopril 40mg 9/23, goal SBP<180  - Increase home to hydrochlorothiazide 25mg BID  - Hold off on coreg due to patient not taking at home     #T2DM  - Lantus 18U nightly (home Lantus 25U nightly)  - SSI with POCT BG  - Urinalysis with 3+ protein consistent with diabetic nephropathy: Will consider starting ACE-Inhibitor once off permissive hypertension     FENGI  Diet: Adult diet Regular, Consistent Carb, Cardiac; CCD 60 gm/meal; Thin 0; 70 gm fat; 2 - 3 grams Sodium  Bowel Regimen: MiraLAX 17g daily     Vascular Risk Factor modification goals:  Blood pressure goals: avoid hypotension SBP <100 that could worsen cerebral perfusion, Ischemic stroke- early permissive hypertension SBP < 220 mmHg with cautious inpatient lowering  Lipid Goals: education on healthy diet and statin therapy to maintain or achieve  goal LDL-cholesterol < 70mg  Glucose Goals: early treatment of hyperglycemia to goal glucose 140-180 mg/dl with long-term goal A1c < 7%   Smoking Cessation and Education  Assessment for Rehabilitation needs   Patient and family education on signs and symptoms of stroke, calling 911, healthy strategies for stroke prevention.    CODE STATUS: Full Code (Confirmed on admission)  Neck of Kin: Aparna Fontanez (Mother): [phone]      Robbi Quigley MD  Neurology PGY-2

## 2024-09-26 LAB
ALBUMIN SERPL BCP-MCNC: 4.1 G/DL (ref 3.4–5)
ANION GAP SERPL CALC-SCNC: 15 MMOL/L
ATRIAL RATE: 73 BPM
BASOPHILS # BLD AUTO: 0.02 X10*3/UL (ref 0–0.1)
BASOPHILS NFR BLD AUTO: 0.2 %
BUN SERPL-MCNC: 23 MG/DL (ref 6–23)
CALCIUM SERPL-MCNC: 10 MG/DL (ref 8.6–10.6)
CHLORIDE SERPL-SCNC: 103 MMOL/L (ref 98–107)
CO2 SERPL-SCNC: 26 MMOL/L (ref 21–32)
CREAT SERPL-MCNC: 0.64 MG/DL (ref 0.5–1.05)
EGFRCR SERPLBLD CKD-EPI 2021: >90 ML/MIN/1.73M*2
EOSINOPHIL # BLD AUTO: 0.08 X10*3/UL (ref 0–0.7)
EOSINOPHIL NFR BLD AUTO: 0.7 %
ERYTHROCYTE [DISTWIDTH] IN BLOOD BY AUTOMATED COUNT: 12.4 % (ref 11.5–14.5)
GLUCOSE BLD MANUAL STRIP-MCNC: 160 MG/DL (ref 74–99)
GLUCOSE BLD MANUAL STRIP-MCNC: 201 MG/DL (ref 74–99)
GLUCOSE BLD MANUAL STRIP-MCNC: 212 MG/DL (ref 74–99)
GLUCOSE BLD MANUAL STRIP-MCNC: 213 MG/DL (ref 74–99)
GLUCOSE SERPL-MCNC: 166 MG/DL (ref 74–99)
HCT VFR BLD AUTO: 41.8 % (ref 36–46)
HGB BLD-MCNC: 13.9 G/DL (ref 12–16)
IMM GRANULOCYTES # BLD AUTO: 0.03 X10*3/UL (ref 0–0.7)
IMM GRANULOCYTES NFR BLD AUTO: 0.2 % (ref 0–0.9)
LYMPHOCYTES # BLD AUTO: 3.22 X10*3/UL (ref 1.2–4.8)
LYMPHOCYTES NFR BLD AUTO: 26.4 %
MAGNESIUM SERPL-MCNC: 1.74 MG/DL (ref 1.6–2.4)
MCH RBC QN AUTO: 30.3 PG (ref 26–34)
MCHC RBC AUTO-ENTMCNC: 33.3 G/DL (ref 32–36)
MCV RBC AUTO: 91 FL (ref 80–100)
MONOCYTES # BLD AUTO: 0.71 X10*3/UL (ref 0.1–1)
MONOCYTES NFR BLD AUTO: 5.8 %
NEUTROPHILS # BLD AUTO: 8.15 X10*3/UL (ref 1.2–7.7)
NEUTROPHILS NFR BLD AUTO: 66.7 %
NRBC BLD-RTO: 0 /100 WBCS (ref 0–0)
P AXIS: 65 DEGREES
P OFFSET: 207 MS
P ONSET: 155 MS
PHOSPHATE SERPL-MCNC: 4.2 MG/DL (ref 2.5–4.9)
PLATELET # BLD AUTO: 308 X10*3/UL (ref 150–450)
POTASSIUM SERPL-SCNC: 3.7 MMOL/L (ref 3.5–5.3)
PR INTERVAL: 138 MS
Q ONSET: 224 MS
QRS COUNT: 12 BEATS
QRS DURATION: 78 MS
QT INTERVAL: 416 MS
QTC CALCULATION(BAZETT): 458 MS
QTC FREDERICIA: 444 MS
R AXIS: -14 DEGREES
RBC # BLD AUTO: 4.58 X10*6/UL (ref 4–5.2)
SODIUM SERPL-SCNC: 140 MMOL/L (ref 136–145)
T AXIS: 135 DEGREES
T OFFSET: 432 MS
VENTRICULAR RATE: 73 BPM
WBC # BLD AUTO: 12.2 X10*3/UL (ref 4.4–11.3)

## 2024-09-26 PROCEDURE — 2500000001 HC RX 250 WO HCPCS SELF ADMINISTERED DRUGS (ALT 637 FOR MEDICARE OP)

## 2024-09-26 PROCEDURE — 97112 NEUROMUSCULAR REEDUCATION: CPT | Mod: GP | Performed by: PHYSICAL THERAPIST

## 2024-09-26 PROCEDURE — 94760 N-INVAS EAR/PLS OXIMETRY 1: CPT

## 2024-09-26 PROCEDURE — 85025 COMPLETE CBC W/AUTO DIFF WBC: CPT

## 2024-09-26 PROCEDURE — 2500000002 HC RX 250 W HCPCS SELF ADMINISTERED DRUGS (ALT 637 FOR MEDICARE OP, ALT 636 FOR OP/ED)

## 2024-09-26 PROCEDURE — 82947 ASSAY GLUCOSE BLOOD QUANT: CPT

## 2024-09-26 PROCEDURE — 97530 THERAPEUTIC ACTIVITIES: CPT | Mod: GO

## 2024-09-26 PROCEDURE — 36415 COLL VENOUS BLD VENIPUNCTURE: CPT

## 2024-09-26 PROCEDURE — 99232 SBSQ HOSP IP/OBS MODERATE 35: CPT

## 2024-09-26 PROCEDURE — 83735 ASSAY OF MAGNESIUM: CPT

## 2024-09-26 PROCEDURE — 84100 ASSAY OF PHOSPHORUS: CPT

## 2024-09-26 PROCEDURE — 1100000001 HC PRIVATE ROOM DAILY

## 2024-09-26 PROCEDURE — 97535 SELF CARE MNGMENT TRAINING: CPT | Mod: GO

## 2024-09-26 PROCEDURE — 2500000004 HC RX 250 GENERAL PHARMACY W/ HCPCS (ALT 636 FOR OP/ED)

## 2024-09-26 RX ORDER — ACETAMINOPHEN 500 MG
5 TABLET ORAL DAILY
Start: 2024-09-26

## 2024-09-26 RX ORDER — CARVEDILOL 6.25 MG/1
6.25 TABLET ORAL 2 TIMES DAILY
Status: DISCONTINUED | OUTPATIENT
Start: 2024-09-26 | End: 2024-09-27 | Stop reason: HOSPADM

## 2024-09-26 RX ORDER — NAPROXEN SODIUM 220 MG/1
81 TABLET, FILM COATED ORAL DAILY
Start: 2024-09-27

## 2024-09-26 RX ORDER — ICOSAPENT ETHYL 1 G/1
2 CAPSULE ORAL
Start: 2024-09-26

## 2024-09-26 RX ORDER — HYDROCHLOROTHIAZIDE 25 MG/1
25 TABLET ORAL 2 TIMES DAILY
Start: 2024-09-26

## 2024-09-26 RX ORDER — ATORVASTATIN CALCIUM 80 MG/1
80 TABLET, FILM COATED ORAL NIGHTLY
Start: 2024-09-26

## 2024-09-26 RX ORDER — CARVEDILOL 6.25 MG/1
6.25 TABLET ORAL 2 TIMES DAILY
Start: 2024-09-26

## 2024-09-26 RX ADMIN — ATORVASTATIN CALCIUM 80 MG: 80 TABLET, FILM COATED ORAL at 21:01

## 2024-09-26 RX ADMIN — INSULIN GLARGINE 18 UNITS: 100 INJECTION, SOLUTION SUBCUTANEOUS at 21:01

## 2024-09-26 RX ADMIN — Medication 5 MG: at 21:04

## 2024-09-26 RX ADMIN — ENOXAPARIN SODIUM 40 MG: 40 INJECTION SUBCUTANEOUS at 21:00

## 2024-09-26 RX ADMIN — ICOSAPENT ETHYL 2 G: 1 CAPSULE ORAL at 17:34

## 2024-09-26 RX ADMIN — CARVEDILOL 6.25 MG: 6.25 TABLET, FILM COATED ORAL at 11:27

## 2024-09-26 RX ADMIN — INSULIN LISPRO 2 UNITS: 100 INJECTION, SOLUTION INTRAVENOUS; SUBCUTANEOUS at 17:35

## 2024-09-26 RX ADMIN — INSULIN LISPRO 1 UNITS: 100 INJECTION, SOLUTION INTRAVENOUS; SUBCUTANEOUS at 08:52

## 2024-09-26 RX ADMIN — CARVEDILOL 6.25 MG: 6.25 TABLET, FILM COATED ORAL at 21:01

## 2024-09-26 RX ADMIN — HYDROCHLOROTHIAZIDE 25 MG: 25 TABLET ORAL at 08:53

## 2024-09-26 RX ADMIN — ICOSAPENT ETHYL 2 G: 1 CAPSULE ORAL at 08:54

## 2024-09-26 RX ADMIN — INSULIN LISPRO 2 UNITS: 100 INJECTION, SOLUTION INTRAVENOUS; SUBCUTANEOUS at 12:45

## 2024-09-26 RX ADMIN — HYDROCHLOROTHIAZIDE 25 MG: 25 TABLET ORAL at 21:01

## 2024-09-26 RX ADMIN — LISINOPRIL 40 MG: 20 TABLET ORAL at 08:53

## 2024-09-26 RX ADMIN — ASPIRIN 81 MG 81 MG: 81 TABLET ORAL at 08:53

## 2024-09-26 ASSESSMENT — COGNITIVE AND FUNCTIONAL STATUS - GENERAL
CLIMB 3 TO 5 STEPS WITH RAILING: TOTAL
EATING MEALS: A LITTLE
MOBILITY SCORE: 11
DAILY ACTIVITIY SCORE: 14
DRESSING REGULAR LOWER BODY CLOTHING: A LOT
TOILETING: A LOT
TOILETING: A LOT
MOVING TO AND FROM BED TO CHAIR: A LOT
STANDING UP FROM CHAIR USING ARMS: A LOT
PERSONAL GROOMING: A LOT
STANDING UP FROM CHAIR USING ARMS: A LOT
TURNING FROM BACK TO SIDE WHILE IN FLAT BAD: A LOT
TURNING FROM BACK TO SIDE WHILE IN FLAT BAD: A LOT
HELP NEEDED FOR BATHING: A LOT
DRESSING REGULAR UPPER BODY CLOTHING: A LOT
WALKING IN HOSPITAL ROOM: TOTAL
HELP NEEDED FOR BATHING: A LOT
PERSONAL GROOMING: A LITTLE
WALKING IN HOSPITAL ROOM: TOTAL
DRESSING REGULAR UPPER BODY CLOTHING: A LOT
DAILY ACTIVITIY SCORE: 13
EATING MEALS: A LITTLE
MOBILITY SCORE: 11
MOVING FROM LYING ON BACK TO SITTING ON SIDE OF FLAT BED WITH BEDRAILS: A LITTLE
MOVING TO AND FROM BED TO CHAIR: A LOT
CLIMB 3 TO 5 STEPS WITH RAILING: TOTAL
MOVING FROM LYING ON BACK TO SITTING ON SIDE OF FLAT BED WITH BEDRAILS: A LITTLE
DRESSING REGULAR LOWER BODY CLOTHING: A LOT

## 2024-09-26 ASSESSMENT — PAIN - FUNCTIONAL ASSESSMENT
PAIN_FUNCTIONAL_ASSESSMENT: 0-10
PAIN_FUNCTIONAL_ASSESSMENT: 0-10

## 2024-09-26 ASSESSMENT — ACTIVITIES OF DAILY LIVING (ADL): HOME_MANAGEMENT_TIME_ENTRY: 32

## 2024-09-26 ASSESSMENT — PAIN SCALES - GENERAL
PAINLEVEL_OUTOF10: 0 - NO PAIN

## 2024-09-26 NOTE — CARE PLAN
Problem: Skin  Goal: Decreased wound size/increased tissue granulation at next dressing change  9/25/2024 2152 by Maribell Menendez RN  Outcome: Progressing  9/25/2024 2150 by Maribell Menendez RN  Outcome: Progressing  Goal: Participates in plan/prevention/treatment measures  9/25/2024 2152 by Maribell Menendez RN  Outcome: Progressing  9/25/2024 2150 by Maribell Menendez RN  Outcome: Progressing  Goal: Prevent/manage excess moisture  9/25/2024 2152 by Maribell Menendez RN  Outcome: Progressing  9/25/2024 2150 by Maribell Menendez RN  Outcome: Progressing  Goal: Prevent/minimize sheer/friction injuries  9/25/2024 2152 by Maribell Menendez RN  Outcome: Progressing  Flowsheets (Taken 9/25/2024 2152)  Prevent/minimize sheer/friction injuries:   Increase activity/out of bed for meals   Turn/reposition every 2 hours/use positioning/transfer devices   HOB 30 degrees or less  9/25/2024 2150 by Maribell Menendez RN  Outcome: Progressing  Goal: Promote/optimize nutrition  9/25/2024 2152 by Maribell Menendez RN  Outcome: Progressing  9/25/2024 2150 by Maribell Menendez RN  Outcome: Progressing  Goal: Promote skin healing  9/25/2024 2152 by Maribell Menendez RN  Outcome: Progressing  9/25/2024 2150 by Maribell Menendez RN  Outcome: Progressing   The patient's goals for the shift include      The clinical goals for the shift include Patient will remain free from injury/falls on shift

## 2024-09-26 NOTE — PROGRESS NOTES
"Valentina Fontanez is a 59 y.o. female on day 5 of admission presenting with Acute ischemic stroke (Multi).    Subjective   Interval Events: NAEON  AM: Denies any concern.     Objective     Last Recorded Vitals  Blood pressure 169/90, pulse 105, temperature 37.1 °C (98.8 °F), temperature source Temporal, resp. rate 18, height 1.737 m (5' 8.4\"), weight 81.6 kg (180 lb), SpO2 99%.    Temp:  [35.7 °C (96.3 °F)-37.1 °C (98.8 °F)] 37.1 °C (98.8 °F)  Heart Rate:  [] 105  Resp:  [18] 18  BP: (154-185)/(82-99) 169/90 BP trending down    Physical Exam  Neurological Exam    Physical Exam  Mental Status  Alert. Oriented to person, place, and time. Mild dysarthria present. Able to follow complex commands.     Cranial Nerves  CN II: Right visual acuity: Counts fingers. Left visual acuity: Counts fingers.  CN III, IV, VI: Extraocular movements intact bilaterally. Pupils equal round and reactive to light bilaterally.  CN V: Facial sensation is normal on the left. Reports decreased sensation over right side of face.  CN VII:   Right: There is central facial weakness.  Left: There is no facial weakness.  CN VIII: Intact to conversation.     Sensory  Light touch abnormality: Reports decreased sensation over right extremities.      09/26/24 @ 8 AM  NIHSS:   - Level of consciousness: 0 = Alert  - LOC Question: 0 = Both correct  - LOC Commands: 0 = Obeys both  - Best Gaze: 0 = Normal  - Visual Field: 0 = No visual loss  - Facial Paresis: 2 = Complete UMN  - LUE: 0 = No drift; 10 sec  - RUE: 4 = No movement  - LLE: 0 = No drift; 5 sec  - RLE: 1 = Drift  - Limb Ataxia: 0 = Absent  - Sensory: 0 = Absent  - Best Language: 0 = No aphasia  - Dysarthria: 1 = Mild-moderate  - Neglect: 0 = None  TOTAL: 8          Yair Coma Scale  Best Eye Response: Spontaneous  Best Verbal Response: Oriented  Best Motor Response: Follows commands  Yair Coma Scale Score: 15          Results for orders placed or performed during the hospital encounter of " 09/21/24 (from the past 24 hour(s))   POCT GLUCOSE   Result Value Ref Range    POCT Glucose 177 (H) 74 - 99 mg/dL   POCT GLUCOSE   Result Value Ref Range    POCT Glucose 261 (H) 74 - 99 mg/dL   POCT GLUCOSE   Result Value Ref Range    POCT Glucose 178 (H) 74 - 99 mg/dL   POCT GLUCOSE   Result Value Ref Range    POCT Glucose 227 (H) 74 - 99 mg/dL         Assessment/Plan      Assessment & Plan  Acute ischemic stroke (Multi)    Valentina Fontanez is a 59-year-old female with PMH of HTN, T2DM, acute viral meningitis with a generalized tonic-clonic seizure, and smoking history (~1PPD for 10 years, quit 7 years ago) who presented with right-sided weakness and slurred speech with LKW night prior around 2100. NIHSS 10 (LOCq 1, Facial paresis 2, RUE No effort 3, RLE Effort versus gravity 2, Dysarthria 1, Sensory 1). CT Head with prior left lacunar infarction and CT Angio was unremarkable. MRI Brain Stroke Protocol showed an acute to subacute left corona radiata ischemic infarction (volume 1.5 mL (cm³). She was not a candidate for TNK because outside of the window and no mechanical thrombectomy because no LVO. Her hemoglobin A1c 8.1%. LDL was unable to be calculated in setting of hyper-triglyceridemia (498) so started on omega-3 fatty acids. Urine toxicology positive for cocaine. Etiology is small-vessel disease given risk factors superimposed with vasoconstriction from cocaine. She was started on Aspirin and Atorvastatin for secondary stroke prevention. Now complete stroke work up.     Stroke Classification:  Type: Ischemic stroke  Subtype/etiology: Small vessel with vasoconstriction Vs cocaines  Vessels involved: Lenticulostriate   Neurological manifestations:  Pre-Intervention Deficits: NIHSS 10 (LOCq 1, Facial paresis 2, RUE No effort 3, RLE Effort versus gravity 2, Dysarthria 1, Sensory 1)  Pre-stroke mRS: 0  Initial treatment: Aspirin 81 mg  Anti-platelets or Anti-coagulation management: Aspirin  Vascular Risk Factors: HTN,  T2DM, previous smoker  Antiplatelet/antithrombotic plan for stroke prevention: Will consider DAPT  VTE prophylaxis: Lovenox 40 mg daily  ----------  Update 09/26/24  - c/w lisinopril 40mg, hydrochlorothiazide 25mg BID  - c/w glargine 18 units (just receive last night), will follow glucose level today   - Goal SBP<180  - Start coreg 6.25mg BID, could start amlodipine if needed    To follow up:  - BP control  - Glucose control  - Dispo    Dispo: High intensity  ---------     #Left corona radiata infarction  :: Hemoglobin A1c 8.1%, LDL unable to be calculated, Urine Toxicology cocaine-positive  :: Triglycerides: 498  :: EKG: NSR  :: TTE (9/23): LVEF 69%, impaired relaxation pattern of LV diastolic filling, moderate concentric LVH, no PFO, normal LA size  - Admit to floor with Telemetry  - Aspirin 81 mg daily  - Atorvastatin 40 mg nightly  - Started omega 3 for elevated triglycerides.  - PT & OT evaluation     #HTN  :: Permissive Hypertension < 220 for 24-48 hours (9/21-9/22)  - PRN Hydralazine and Labetolol  - Hold home Coreg, and HCTZ   - Restart home lisinopril 40mg 9/23, goal SBP<180  - Increase home to hydrochlorothiazide 25mg BID  - Restart low dose coreg 6.25 BID     #T2DM  - Lantus 18U nightly (home Lantus 25U nightly)  - SSI with POCT BG  - Urinalysis with 3+ protein consistent with diabetic nephropathy: Will consider starting ACE-Inhibitor once off permissive hypertension     FENGI  Diet: Adult diet Regular, Consistent Carb, Cardiac; CCD 60 gm/meal; Thin 0; 70 gm fat; 2 - 3 grams Sodium  Bowel Regimen: MiraLAX 17g daily     Vascular Risk Factor modification goals:  Blood pressure goals: avoid hypotension SBP <100 that could worsen cerebral perfusion, Ischemic stroke- early permissive hypertension SBP < 220 mmHg with cautious inpatient lowering  Lipid Goals: education on healthy diet and statin therapy to maintain or achieve goal LDL-cholesterol < 70mg  Glucose Goals: early treatment of hyperglycemia to goal  glucose 140-180 mg/dl with long-term goal A1c < 7%   Smoking Cessation and Education  Assessment for Rehabilitation needs   Patient and family education on signs and symptoms of stroke, calling 911, healthy strategies for stroke prevention.    CODE STATUS: Full Code (Confirmed on admission)  Neck of Kin: Aparna Fontanez (Mother): [phone]      Robbi Quigley MD  Neurology PGY-2

## 2024-09-26 NOTE — PROGRESS NOTES
09/26/24 1249   Discharge Planning   Expected Discharge Disposition   ( [facility])     Spoke to patient about therapy's recommendation for High intensity therapy. She is agreeable to discharge to Acute Rehab. [facility] has accept patient and precert was initiated.   Add 1445 Pt was approved for AR and [facility] has a bed available tomorrow.  Transport was requested but not yet confirmed for tomorrow at 1:30.

## 2024-09-26 NOTE — NURSING NOTE
Patient continued severe agitation on shift with wanting to leave facility. Patient continued to remove tele leads refusing medical care and continued to try and get out of bed. No PRN medications ordered for agitation noted. Messaged residence, awaiting response at this time.

## 2024-09-26 NOTE — PROGRESS NOTES
Physical Therapy    Physical Therapy Treatment    Patient Name: Valentina Fontanez  MRN: [id number]  Department: [facility] [id number]  Room: [address] [place] [address]-  Today's Date: 9/26/2024  Time Calculation  Start Time: 1503  Stop Time: 1541  Time Calculation (min): 38 min         Assessment/Plan   PT Assessment  PT Assessment Results: Decreased strength, Decreased range of motion, Decreased endurance, Impaired balance, Decreased mobility, Decreased coordination, Impaired judgement, Decreased cognition, Decreased safety awareness, Impaired sensation, Impaired tone  Rehab Prognosis: Good  Barriers to Discharge: none noted  Evaluation/Treatment Tolerance: Patient tolerated treatment well  Medical Staff Made Aware: Yes  Strengths: Ability to acquire knowledge, Attitude of self (motivation)  Barriers to Participation: Other (Comment) (none)  End of Session Communication: Bedside nurse, Physician (RN and MD aware of HTN)  Assessment Comment: Pt is a 60 yo female with acute to subacute Left corona radiata infarct presents with Right paresis (UE > LE), dysarthria, decreased balance and decreased safety awareness; pt with improved mobility on this date. Recommend high intensity therapy as pt is young and appears very motivated to rehab, is not at baseline, needs all 3 therapies, is making gains and likely has equipment needs.Recommend orthotics consult for Right PRAFO for bed (Right ankle plantarflexors getting tight).  End of Session Patient Position: Alarm on, Bed, 3 rail up  PT Plan  Inpatient/Swing Bed or Outpatient: Inpatient  PT Plan  Treatment/Interventions: Bed mobility, Transfer training, Gait training, Balance training, Neuromuscular re-education, Strengthening, Endurance training, Range of motion, Therapeutic exercise, Therapeutic activity, Home exercise program, Orthotic fitting/training, Positioning, Postural re-education  PT Plan: Ongoing PT  PT Frequency: 5 times per week  PT Discharge Recommendations: High intensity level of continued  care  Equipment Recommended upon Discharge: Other (comment) (TBD at rehab)  PT Recommended Transfer Status: Assist x1 (no device, mod/max A +1 squat pivot)  PT - OK to Discharge: Yes (PT eval completed & DC recs made)      General Visit Information:   PT  Visit  PT Received On: 09/26/24  Response to Previous Treatment: Patient with no complaints from previous session.  General  Reason for Referral: Pt presented to ED with right-sided weakness and slurred speech and fall,  NIHSS 10. Dx: acute to subacute left corona radiata ischemic infarct  Past Medical History Relevant to Rehab: HTN, T2DM, acute viral meningitis with a generalized tonic-clonic seizures, and smoking history (~1PPD for 10 years, quit 7 years ago)  Missed Visit: No  Missed Visit Reason:  (.)  Family/Caregiver Present: No  Caregiver Feedback: brother arrived at end of first session  Co-Treatment:  (N/A)  Prior to Session Communication: Bedside nurse  Patient Position Received: Bed, 3 rail up, Alarm on  Preferred Learning Style: kinesthetic, verbal  General Comment: Pt supine in bed alert, very engaged in session with RIght paresis, dysarthria as noted. Pt able to transfer to chair as noted and was tearful about her weakness. BP at to of parameter today (RN and MD aware).    Subjective   Precautions:  Precautions  Medical Precautions: Seizure precautions, Fall precautions (SBP <220, DM)    Vital Signs (Past 2hrs)        Date/Time Vitals Session Patient Position Pulse Resp SpO2 BP MAP (mmHg)    09/26/24 1500 --  --  78  --  96 %  137/70  --     09/26/24 1505 Post PT  Lying  78  --  94 %  137/70  --                   Vital Signs Comment: end of session, no symptoms     Objective   Pain:  Pain Assessment  Pain Assessment: 0-10  0-10 (Numeric) Pain Score: 0 - No pain  Cognition:  Cognition  Arousal/Alertness: Delayed responses to stimuli  Orientation Level:  (oriented x 3)  Following Commands: Follows one step commands with increased time  Cognition  Comments: appears apraxic, mild lability, mild Right inattention, significant Right paresis (UE > LE)  Memory: Exceptions to WFL  Problem Solving: Exceptions to WFL  Safety/Judgement: Exceptions to WFL  Insight: Moderate  Processing Speed: Delayed  Coordination:  Movements are Fluid and Coordinated: No (Right UE/LE dyscoordinated with attempts to assess volitional movement)  Postural Control:  Postural Control  Postural Control: Impaired  Posture Comment: standing: RIght hip/knee buckling or held in genu recurvatum/equinus, posterior lean, lean towards Right, Right UE requires support  Static Sitting Balance  Static Sitting-Balance Support: Feet supported, Bilateral upper extremity supported  Static Sitting-Level of Assistance: Contact guard (cues for safety)  Dynamic Sitting Balance  Dynamic Sitting-Balance Support: Feet supported, Left upper extremity supported  Dynamic Sitting-Level of Assistance: Moderate assistance (min to mod A)  Dynamic Sitting-Balance:  (scoot to EOB)  Static Standing Balance  Static Standing-Balance Support: Bilateral upper extremity supported (on PT in front, blocking knee, no device)  Static Standing-Level of Assistance: Maximum assistance (mod to max A)  Extremity/Trunk Assessments:  RUE   RUE : Exceptions to WFL  RUE Strength  RUE Overall Strength:  (0/5 except: shoulder adduction 2, shoulder internal rotation 2-, shoulder shrug 2-)  RUE Tone  RUE Tone: Hypotonic (except increased tone at shoulder)  RLE   RLE : Exceptions to WFL  AROM RLE (degrees)  RLE AROM Comment:  (ankle DF with KE tight end range)  Strength RLE  RLE Overall Strength:  (supine: hip abd 2, hip adduction 2+, knee flexion 2-)  Tone RLE  RLE Tone: Hypertonic (hip adductors, knee extensors and ankle plantarflexors)  Activity Tolerance:  Activity Tolerance  Endurance: Other (Comment), Tolerates 30 min exercise with multiple rests (rest breaks as needed)  Treatments:  Therapeutic Exercise  Therapeutic Exercise Performed:  Yes  Therapeutic Exercise Activity 1: Right LE supine: hip abd/add, knee flex/ext, bridging, knee flex/ext    Balance/Neuromuscular Re-Education  Balance/Neuromuscular Re-Education Activity Performed: Yes  Balance/Neuromuscular Re-Education Activity 1: facilitation of use of Right UE/LE with rolling and coming to/from sit  Balance/Neuromuscular Re-Education Activity 2: facilitation of sit to stand transfers with focus on use of Right side, midline and stretching Right foot into neutral DF through WB  Balance/Neuromuscular Re-Education Activity 3: standing balance with focus on less posterior lean, midline posture, Right ankle neutral DF    Bed Mobility  Bed Mobility: Yes  Bed Mobility 1  Bed Mobility 1: Rolling left  Level of Assistance 1: Moderate assistance, Minimal verbal cues, Moderate tactile cues  Bed Mobility 2  Bed Mobility  2: Side lying left to sit  Level of Assistance 2: Moderate assistance, Moderate tactile cues, Minimal verbal cues  Bed Mobility 3  Bed Mobility 3: Scooting (in sitting)  Level of Assistance 3: Moderate verbal cues, Moderate tactile cues, Moderate assistance    Ambulation/Gait Training  Ambulation/Gait Training Performed: No (not yet able)  Transfers  Transfer: Yes  Transfer 1  Transfer From 1: Sit to, Stand to  Transfer to 1: Sit, Stand  Technique 1: Sit to stand, Stand to sit  Transfer Device 1: Gait belt (no device)  Transfer Level of Assistance 1: Maximum assistance, Moderate verbal cues, Moderate tactile cues (max A)  Trials/Comments 1: 7 trials; other person assisted to clean pt up (slightly incontinent of bowel)  Transfers 2  Transfer Level of Assistance 2:  (max A initially to get into chair; mod/max A to get back to bed later)    Stairs  Stairs: No (note yet able to do)         Outcome Measures:  [facility] Basic Mobility  Turning from your back to your side while in a flat bed without using bedrails: A little  Moving from lying on your back to sitting on the side of a flat bed  "without using bedrails: A lot  Moving to and from bed to chair (including a wheelchair): A lot  Standing up from a chair using your arms (e.g. wheelchair or bedside chair): A lot  To walk in hospital room: Total  Climbing 3-5 steps with railing: Total  Basic Mobility - Total Score: 11    Education Documentation  Body Mechanics, taught by Elsie Montoya, PT at 9/26/2024  3:47 PM.  Learner: Patient  Readiness: Eager  Method: Explanation, Demonstration, Teach-back  Response: Needs Reinforcement, Demonstrated Understanding, Verbalizes Understanding  Comment: progress, use of Right UE in function, need for Right foot PRAFO/getting tight, overview of rehab    Mobility Training, taught by Elsie Montoya, PT at 9/26/2024  3:47 PM.  Learner: Patient  Readiness: Eager  Method: Explanation, Demonstration, Teach-back  Response: Needs Reinforcement, Demonstrated Understanding, Verbalizes Understanding  Comment: progress, use of Right UE in function, need for Right foot PRAFO/getting tight, overview of rehab    Education Comments  No comments found.           Encounter Problems       Encounter Problems (Active)       Balance       Patient will complete static and dynamic sitting balance tasks with SUP to improve upright posture, core activation, and proximal stability.  (Not met)       Start:  09/22/24    Expected End:  10/06/24    Resolved:  09/25/24    Updated to: Patient will reach in sitting with Left UE >8\" out of BUSHRA (laterally either direction and anteriorly with SBA, no LOB)    Update reason: update         Patient to demo static standing with unilateral UE support, performing single UE task with minAx1, no sway or LOB x 2 mins for functional carryover  (Not met)       Start:  09/22/24    Expected End:  10/06/24    Resolved:  09/25/24    Updated to: Patient to stand statically with LRAD and min A, no Right knee buckling >8 minutes with stable BP and no LOB    Update reason: update         Patient to stand statically " "with LRAD and min A, no Right knee buckling >8 minutes with stable BP and no LOB (Progressing)       Start:  09/25/24    Expected End:  10/09/24                Patient will reach in sitting with Left UE >8\" out of BUSHRA (laterally either direction and anteriorly with SBA, no LOB) (Progressing)       Start:  09/25/24    Expected End:  10/09/24                   Mobility       STG - Patient will ambulate >/= 10 ft with minAx1 and LRAD (Not met)       Start:  09/22/24    Expected End:  10/06/24    Resolved:  09/25/24    Updated to: Patient with ambulate 25' with LRAD and min/mod A (left ankle DF assist with Ace wrap) and chair follow with stable BP    Update reason: update         Pt. will tolerate >/= 10 minutes of OOB mobility without seated rest break and VSS to demo improved activity tolerance/endurance.  (Not met)       Start:  09/22/24    Expected End:  10/06/24    Resolved:  09/25/24    Updated to: Pt. will come supine to/from sit from either side, HOB elevated 45 degrees, use of rail, with supervision safely    Update reason: update         Pt. will come supine to/from sit from either side, HOB elevated 45 degrees, use of rail, with supervision safely (Progressing)       Start:  09/25/24    Expected End:  10/09/24                Patient with ambulate 25' with LRAD and min/mod A (left ankle DF assist with Ace wrap) and chair follow with stable BP (Not Progressing)       Start:  09/25/24    Expected End:  10/09/24                   PT Transfers       STG - Patient will perform bed mobility with CGAx1 (Not met)       Start:  09/22/24    Expected End:  10/06/24    Resolved:  09/25/24    Updated to: Strength Right ankle DF >3, knee extensors >4- and hip flexors >4-    Update reason: update         STG - Patient will transfer sit <> stand and bed<>chair with minAx1 and LRAD (Not met)       Start:  09/22/24    Expected End:  10/06/24    Resolved:  09/25/24    Updated to: Patient will come sit to/from stand, bed to/from " chair with LRAD and Right DF assist Ace wrap with min A, no Right knee buckling/LOB and stable BP    Update reason: update         Strength Right ankle DF >3, knee extensors >4- and hip flexors >4- (Progressing)       Start:  09/25/24    Expected End:  10/09/24                Patient will come sit to/from stand, bed to/from chair with LRAD and Right DF assist Ace wrap with min A, no Right knee buckling/LOB and stable BP (Progressing)       Start:  09/25/24    Expected End:  10/09/24

## 2024-09-26 NOTE — CARE PLAN
The patient's goals for the shift include      The clinical goals for the shift include Patient will remain free from injury/falls on shift      Problem: Skin  Goal: Decreased wound size/increased tissue granulation at next dressing change  Outcome: Progressing  Goal: Participates in plan/prevention/treatment measures  Outcome: Progressing  Goal: Prevent/manage excess moisture  Outcome: Progressing  Goal: Prevent/minimize sheer/friction injuries  Outcome: Progressing  Goal: Promote/optimize nutrition  Outcome: Progressing  Goal: Promote skin healing  Outcome: Progressing     Problem: Fall/Injury  Goal: Not fall by end of shift  Outcome: Progressing  Goal: Be free from injury by end of the shift  Outcome: Progressing  Goal: Verbalize understanding of personal risk factors for fall in the hospital  Outcome: Progressing  Goal: Verbalize understanding of risk factor reduction measures to prevent injury from fall in the home  Outcome: Progressing  Goal: Use assistive devices by end of the shift  Outcome: Progressing  Goal: Pace activities to prevent fatigue by end of the shift  Outcome: Progressing

## 2024-09-26 NOTE — PROGRESS NOTES
Occupational Therapy    Occupational Therapy Treatment    Name: Valentina Fontanez  MRN: [id number]  : 1965  Date: 24  Room: [address] [place], [place] [address]    Time Calculation  Start Time: 1041  Stop Time: 1131  Time Calculation (min): 50 min    Assessment:  OT Assessment: Pt made progress towards therapeutic goals this session. Pt able to perform stand pivot and bed mobility with increased independence than previously noted. Pt did require education and training on transfer techniques and compensatory strategies to manage mobility and ADL tasks. Pt tearful intermittently during session requirig verbal encouragement and education on benefits of therapy services. Pt continues to benefit from skilled OT services at a HIGH intensity to address noted deficits.  Prognosis: Good  Barriers to Discharge: Decreased caregiver support  Evaluation/Treatment Tolerance: Patient tolerated treatment well  Medical Staff Made Aware: Yes  End of Session Communication: Bedside nurse, PCT/NA/CTA, Care Coordinator  End of Session Patient Position: Bed, 3 rail up, Alarm on  Plan:  Treatment Interventions: ADL retraining, Functional transfer training, UE strengthening/ROM, Endurance training, Cognitive reorientation, Equipment evaluation/education, Neuromuscular reeducation, Compensatory technique education  OT Frequency: 4 times per week  OT Discharge Recommendations: High intensity level of continued care  Equipment Recommended upon Discharge:  (TBD)  OT Recommended Transfer Status: Assist of 1, Maximum assist  OT - OK to Discharge: Yes    Subjective   General:  OT Last Visit  OT Received On: 24  Reason for Referral: Pt presented to ED with right-sided weakness and slurred speech and fall,  NIHSS 10. Dx: acute to subacute left corona radiata ischemic infarct  Past Medical History Relevant to Rehab: HTN, T2DM, acute viral meningitis with a generalized tonic-clonic seizures, and smoking history (~1PPD for 10 years, quit 7 years ago)  Prior to  Session Communication: Bedside nurse, PCT/NA/CTA  Patient Position Received: Bed, 3 rail up, Alarm on  Family/Caregiver Present: No  General Comment: Pt supine in bed upon arrival. Pleasant and agreeable to OT tx. Pt tearful and reporting she is upset that she can no longer protect her mother and others due to her deficits.   Precautions:  Medical Precautions: Seizure precautions, Fall precautions  Vitals:  Vital Signs (Past 2hrs)        Date/Time Vitals Session Patient Position Pulse Resp SpO2 BP MAP (mmHg)    09/26/24 1152 --  --  86  20  96 %  158/88  111                   Lines/Tubes/Drains:  External Urinary Catheter Female (Active)   Number of days: 1     Cognition:  Overall Cognitive Status: Within Functional Limits  Arousal/Alertness: Delayed responses to stimuli  Orientation Level: Oriented X4  Following Commands: Follows one step commands with repetition (and time)  Cognition Comments: Pt with labile mood throughout, tearful, and demo'd delayed processing, decreased insight. Good awareness of RUE this session  Attention: Within Functional Limits  Problem Solving: Exceptions to WFL  Insight: Mild  Impulsive: Within functional limits  Processing Speed: Delayed    Pain Assessment:  Pain Assessment  Pain Assessment: 0-10  0-10 (Numeric) Pain Score: 0 - No pain     Objective   Activities of Daily Living:      Grooming  Grooming Comments: Pt completed grooming tasks while seated on BSC and while in bed with HOB elevated. Pt able to wash face while seated on BSC with SBA. Pt also able to complete oral hygiene while sitting up in bed with SBA and VCs for guidance- educated on compensatory strategy of not placing toothpaste on toothbrush but placing some toothpaste in her mouth and then using toothbrush. Pt demo'd understanding.    UE Bathing  UE Bathing Comments: Pt able to complete UB bathing tasks with Madisno- able to wash most of UB but required Madison to lift RUE to wash armpit. Educated on leaning forward to open  arm space but was unable to complete due to environment and safety.    LE Bathing  LE Bathing Comments: Pt washed LB with ModA while seated on BSC- pt able to wash from waist down to knees thoroughly but required ModA to wash and dry lower legs and feet.    UE Dressing  UE Dressing Comments: Pt required ModA for RUE management and pull around in back- Pt provided appropriate time and cues to complete but was unable due to frustrations and tearfulness    LE Dressing  LE Dressing: Yes  LE Dressing Comments: Pt required MaxA for BLE sock management, unable to complete figure four technique while seated on BSC         Functional Standing Tolerance:  Functional Mobility  Functional Mobility Performed: No  Bed Mobility/Transfers:   Bed Mobility  Bed Mobility: Yes  Bed Mobility 1  Bed Mobility 1: Supine to sitting  Level of Assistance 1: Moderate assistance, Minimal verbal cues  Bed Mobility Comments 1: ModA for UB management; provided adequete time to complete task but was unsuccessful due to R hemibody deficits  Bed Mobility 2  Bed Mobility  2: Sitting to supine  Level of Assistance 2: Minimum assistance, Moderate verbal cues, Minimal tactile cues  Bed Mobility Comments 2: VCs for hooking technique using LLE to hook RLE to bring up into bed- pt demo'd good understanding twice    Transfers  Transfer: Yes  Transfer 1  Transfer From 1: Bed to, Commode-standard to  Transfer to 1: Commode-standard, Bed  Technique 1: Sit to stand, Stand pivot, Stand to sit  Transfer Device 1: Gait belt  Transfer Level of Assistance 1: Moderate assistance, Moderate verbal cues, Moderate tactile cues  Trials/Comments 1: Cues for strategy and sequencing, ModA using gait belt with OT in front    Balance:  Dynamic Sitting Balance  Dynamic Sitting-Balance Support: Feet supported, Left upper extremity supported  Dynamic Sitting-Level of Assistance: Contact guard, Minimum assistance  Dynamic Sitting-Balance: Forward lean, Reaching for objects, Trunk  control activities  Dynamic Sitting-Comments: Fair dynamic sitting balance  Static Sitting Balance  Static Sitting-Balance Support: Left upper extremity supported  Static Sitting-Level of Assistance: Contact guard  Static Sitting-Comment/Number of Minutes: Cues to maintain upright posture    Therapy/Activity:      Therapeutic Activity  Therapeutic Activity Performed: Yes  Therapeutic Activity 1: Functional mobility and transfer training as noted requiring skilled assistance and cueing for safety and training  Therapeutic Activity 2: Seated balance training- pt sat supported and unsupported on BSC for ~25 total minutes during ADL tasks and education  Therapeutic Activity 3: Educated on therapeutic benefits of OT and rehab on functional independence prior to going home    Outcome Measures:  [facility] Daily Activity  Putting on and taking off regular lower body clothing: A lot  Bathing (including washing, rinsing, drying): A lot  Putting on and taking off regular upper body clothing: A lot  Toileting, which includes using toilet, bedpan or urinal: A lot  Taking care of personal grooming such as brushing teeth: A little  Eating Meals: A little  Daily Activity - Total Score: 14     Education Documentation  Body Mechanics, taught by Ran Bartlett OT at 9/26/2024  1:35 PM.  Learner: Patient  Readiness: Acceptance  Method: Explanation, Demonstration  Response: Verbalizes Understanding, Demonstrated Understanding  Comment: compensatory strategies, benefits of OT and rehab, mobility training    Precautions, taught by Ran Bartlett OT at 9/26/2024  1:35 PM.  Learner: Patient  Readiness: Acceptance  Method: Explanation, Demonstration  Response: Verbalizes Understanding, Demonstrated Understanding  Comment: compensatory strategies, benefits of OT and rehab, mobility training    ADL Training, taught by Ran Bartlett OT at 9/26/2024  1:35 PM.  Learner: Patient  Readiness: Acceptance  Method: Explanation,  Demonstration  Response: Verbalizes Understanding, Demonstrated Understanding  Comment: compensatory strategies, benefits of OT and rehab, mobility training    Education Comments  No comments found.        Goals:  Encounter Problems       Encounter Problems (Active)       ADLs       Patient with complete upper body dressing with stand by assist level of assistance  (Progressing)       Start:  09/22/24    Expected End:  10/13/24            Patient with complete lower body dressing with minimal assist  level of assistance   (Progressing)       Start:  09/22/24    Expected End:  10/13/24               EXERCISE/STRENGTHENING       Patient with increase RUE to 2-/5 strength. (Progressing)       Start:  09/22/24    Expected End:  10/13/24               TRANSFERS       Patient will perform bed mobility stand by assist level of assistance in order to improve safety and independence with mobility (Progressing)       Start:  09/22/24    Expected End:  10/13/24            Patient will complete functional transfers with least restrictive device with minimal assist  level of assistance. (Progressing)       Start:  09/22/24    Expected End:  10/13/24                     09/26/24 at 1:37 PM   Ran Bartlett, OT   [phone]

## 2024-09-26 NOTE — DISCHARGE INSTRUCTIONS
Dear Ms. Valentina Fontanez ,    You were admitted to [facility] because you had a stroke. You initially had right-sided weakness and slurred speech. MRI scan showed a stroke in the left side of your brain. You were admitted for further workup of stroke.    We investigated the cause of your stroke.  The echocardiogram (ultrasound of your heart) showed heart thickening that can be related to hypertension. The imaging of the vessels in your brain also showed no abnormality of blood vessels in the neck and brain.  The cause of stroke might be contributed to the hypertension and cocaine use. Therefore, you were treated with blood-thinner medications called Aspirin. We also start you on a lipid lowing medication called atorvastatin and omega-3 fatty acids (Vascepa). Please continue to take these medications at home.     Speech therapy evaluated that you are safe to swallow and you were restarted on a diet.    Physical Therapy and Occupational Therapy came to evaluate you and recommend acute rehab facility.     You will follow up with a Stroke Neurology doctor in 1 month. You will also follow up with your primary care provider (ideally within 1 week).      IMPORTANT MEDICATION NOTES:  - Started Aspirin once daily  - Started Atorvastatin 80mg NIGHTLY  - Started omega-3 fatty acids (Vascepa) twice daily  - Please stop taking cocaine as it is a risk factor for stroke    FOLLOW UP APPOINTMENTS:  - Stroke Neurology in 1 month. You will be called to schedule the appointment. Alternatively, you can call [phone] to schedule an appointment for follow up.  - Primary care provider in 1 week. Please arrange your appointment with your PCP or call Central Scheduling at [phone] to arrange an appointment with  PCP. They will follow your blood pressures, blood sugar levels, cholesterol and overall health    It was a pleasure taking care of you.  The  Neurology Care Team

## 2024-09-27 ENCOUNTER — HOSPITAL ENCOUNTER (EMERGENCY)
Facility: HOSPITAL | Age: 59
Discharge: HOME | End: 2024-09-28
Attending: EMERGENCY MEDICINE
Payer: COMMERCIAL

## 2024-09-27 ENCOUNTER — APPOINTMENT (OUTPATIENT)
Dept: RADIOLOGY | Facility: HOSPITAL | Age: 59
End: 2024-09-27
Payer: COMMERCIAL

## 2024-09-27 VITALS
BODY MASS INDEX: 27.28 KG/M2 | HEART RATE: 78 BPM | RESPIRATION RATE: 20 BRPM | DIASTOLIC BLOOD PRESSURE: 76 MMHG | HEIGHT: 68 IN | OXYGEN SATURATION: 96 % | SYSTOLIC BLOOD PRESSURE: 157 MMHG | TEMPERATURE: 96.4 F | WEIGHT: 180 LBS
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
## 2024-09-27 LAB
GLUCOSE BLD MANUAL STRIP-MCNC: 147 MG/DL (ref 74–99)
GLUCOSE BLD MANUAL STRIP-MCNC: 199 MG/DL (ref 74–99)

## 2024-09-27 PROCEDURE — 70450 CT HEAD/BRAIN W/O DYE: CPT | Performed by: RADIOLOGY

## 2024-09-27 PROCEDURE — 2500000001 HC RX 250 WO HCPCS SELF ADMINISTERED DRUGS (ALT 637 FOR MEDICARE OP)

## 2024-09-27 PROCEDURE — 82947 ASSAY GLUCOSE BLOOD QUANT: CPT

## 2024-09-27 PROCEDURE — 99284 EMERGENCY DEPT VISIT MOD MDM: CPT | Mod: 25

## 2024-09-27 PROCEDURE — 2500000002 HC RX 250 W HCPCS SELF ADMINISTERED DRUGS (ALT 637 FOR MEDICARE OP, ALT 636 FOR OP/ED)

## 2024-09-27 PROCEDURE — 99232 SBSQ HOSP IP/OBS MODERATE 35: CPT

## 2024-09-27 PROCEDURE — 70450 CT HEAD/BRAIN W/O DYE: CPT

## 2024-09-27 RX ADMIN — HYDROCHLOROTHIAZIDE 25 MG: 25 TABLET ORAL at 08:35

## 2024-09-27 RX ADMIN — ASPIRIN 81 MG 81 MG: 81 TABLET ORAL at 08:35

## 2024-09-27 RX ADMIN — CARVEDILOL 6.25 MG: 6.25 TABLET, FILM COATED ORAL at 08:35

## 2024-09-27 RX ADMIN — ICOSAPENT ETHYL 2 G: 1 CAPSULE ORAL at 08:34

## 2024-09-27 RX ADMIN — LISINOPRIL 40 MG: 20 TABLET ORAL at 08:35

## 2024-09-27 RX ADMIN — INSULIN LISPRO 1 UNITS: 100 INJECTION, SOLUTION INTRAVENOUS; SUBCUTANEOUS at 12:07

## 2024-09-27 ASSESSMENT — PAIN - FUNCTIONAL ASSESSMENT
PAIN_FUNCTIONAL_ASSESSMENT: 0-10

## 2024-09-27 ASSESSMENT — PAIN SCALES - GENERAL
PAINLEVEL_OUTOF10: 0 - NO PAIN

## 2024-09-27 ASSESSMENT — COGNITIVE AND FUNCTIONAL STATUS - GENERAL
DRESSING REGULAR LOWER BODY CLOTHING: A LOT
DRESSING REGULAR UPPER BODY CLOTHING: A LOT
HELP NEEDED FOR BATHING: A LOT
MOVING FROM LYING ON BACK TO SITTING ON SIDE OF FLAT BED WITH BEDRAILS: A LITTLE
TOILETING: A LOT
TURNING FROM BACK TO SIDE WHILE IN FLAT BAD: A LOT
EATING MEALS: A LITTLE
MOBILITY SCORE: 11
STANDING UP FROM CHAIR USING ARMS: A LOT
DAILY ACTIVITIY SCORE: 13
PERSONAL GROOMING: A LOT
WALKING IN HOSPITAL ROOM: TOTAL
MOVING TO AND FROM BED TO CHAIR: A LOT
CLIMB 3 TO 5 STEPS WITH RAILING: TOTAL

## 2024-09-27 ASSESSMENT — PAIN DESCRIPTION - PROGRESSION: CLINICAL_PROGRESSION: NOT CHANGED

## 2024-09-27 ASSESSMENT — PAIN DESCRIPTION - PAIN TYPE: TYPE: ACUTE PAIN

## 2024-09-27 NOTE — NURSING NOTE
Transport present tp take pt to acute rehab. Pt alert and oriented with no neurological changes at time of discharge.

## 2024-09-27 NOTE — PROGRESS NOTES
9/27/24 @0854 Transitional Care Coordinator Note  Transport set for 1:30pm to [facility].  Nurse to nurse report number [phone] was giving to pt's nurse Nemo Fried LPN. I called into the pt's room to introduce myself, role and to discuss discharge planning. I spoke with the pt and updated her that transport is set to pick her up at 1:30pm today transporting her to [facility]. Team aware. Will continue to follow.     9/27/24 @1051 addendum  Pt's ride is no longer in roundtrip, secure chat sent to pt's floor nurse Nemo asking how pt travels. Will request transport via roundtrip. Will continue to follow.     9/27/24 @3322 addendum  Per Nemo pt travels by stretcher, no oxygen. [facility] is handling this ride contact info is [phone]. Will continue to follow.

## 2024-09-27 NOTE — PROGRESS NOTES
"Valentina Fontanez is a 59 y.o. female on day 6 of admission presenting with Acute ischemic stroke (Multi).    Subjective   Interval Events: NAEON  AM: Denies any concern.     Objective     Last Recorded Vitals  Blood pressure 144/68, pulse 67, temperature 36.5 °C (97.7 °F), temperature source Temporal, resp. rate 18, height 1.737 m (5' 8.4\"), weight 81.6 kg (180 lb), SpO2 97%.    Temp:  [36.3 °C (97.3 °F)-36.5 °C (97.7 °F)] 36.5 °C (97.7 °F)  Heart Rate:  [67-86] 67  Resp:  [18-20] 18  BP: (137-158)/(68-90) 144/68     Physical Exam  Neurological Exam    Physical Exam  Mental Status  Alert. Oriented to person, place, and time. Mild dysarthria present. Able to follow complex commands.     Cranial Nerves  CN II: Right visual acuity: Counts fingers. Left visual acuity: Counts fingers.  CN III, IV, VI: Extraocular movements intact bilaterally. Pupils equal round and reactive to light bilaterally.  CN V: Facial sensation is normal on the left. Reports decreased sensation over right side of face.  CN VII:   Right: There is central facial weakness.  Left: There is no facial weakness.  CN VIII: Intact to conversation.     Sensory  Light touch abnormality: Reports decreased sensation over right extremities.      09/27/24 @ 8 AM  NIHSS:   - Level of consciousness: 0 = Alert  - LOC Question: 0 = Both correct  - LOC Commands: 0 = Obeys both  - Best Gaze: 0 = Normal  - Visual Field: 0 = No visual loss  - Facial Paresis: 2 = Complete UMN  - LUE: 0 = No drift; 10 sec  - RUE: 4 = No movement  - LLE: 0 = No drift; 5 sec  - RLE: 2 = Effort vs gravity  - Limb Ataxia: 0 = Absent  - Sensory: 0 = Absent  - Best Language: 0 = No aphasia  - Dysarthria: 1 = Mild-moderate  - Neglect: 0 = None  TOTAL: 9          Yair Coma Scale  Best Eye Response: Spontaneous  Best Verbal Response: Oriented  Best Motor Response: Follows commands  [place] Coma Scale Score: 15          Results for orders placed or performed during the hospital encounter of " 09/21/24 (from the past 24 hour(s))   POCT GLUCOSE   Result Value Ref Range    POCT Glucose 160 (H) 74 - 99 mg/dL   Magnesium   Result Value Ref Range    Magnesium 1.74 1.60 - 2.40 mg/dL   Renal Function Panel   Result Value Ref Range    Glucose 166 (H) 74 - 99 mg/dL    Sodium 140 136 - 145 mmol/L    Potassium 3.7 3.5 - 5.3 mmol/L    Chloride 103 98 - 107 mmol/L    Bicarbonate 26 21 - 32 mmol/L    Anion Gap 15 mmol/L    Urea Nitrogen 23 6 - 23 mg/dL    Creatinine 0.64 0.50 - 1.05 mg/dL    eGFR >90 >60 mL/min/1.73m*2    Calcium 10.0 8.6 - 10.6 mg/dL    Phosphorus 4.2 2.5 - 4.9 mg/dL    Albumin 4.1 3.4 - 5.0 g/dL   CBC and Auto Differential   Result Value Ref Range    WBC 12.2 (H) 4.4 - 11.3 x10*3/uL    nRBC 0.0 0.0 - 0.0 /100 WBCs    RBC 4.58 4.00 - 5.20 x10*6/uL    Hemoglobin 13.9 12.0 - 16.0 g/dL    Hematocrit 41.8 36.0 - 46.0 %    MCV 91 80 - 100 fL    MCH 30.3 26.0 - 34.0 pg    MCHC 33.3 32.0 - 36.0 g/dL    RDW 12.4 11.5 - 14.5 %    Platelets 308 150 - 450 x10*3/uL    Neutrophils % 66.7 40.0 - 80.0 %    Immature Granulocytes %, Automated 0.2 0.0 - 0.9 %    Lymphocytes % 26.4 13.0 - 44.0 %    Monocytes % 5.8 2.0 - 10.0 %    Eosinophils % 0.7 0.0 - 6.0 %    Basophils % 0.2 0.0 - 2.0 %    Neutrophils Absolute 8.15 (H) 1.20 - 7.70 x10*3/uL    Immature Granulocytes Absolute, Automated 0.03 0.00 - 0.70 x10*3/uL    Lymphocytes Absolute 3.22 1.20 - 4.80 x10*3/uL    Monocytes Absolute 0.71 0.10 - 1.00 x10*3/uL    Eosinophils Absolute 0.08 0.00 - 0.70 x10*3/uL    Basophils Absolute 0.02 0.00 - 0.10 x10*3/uL   POCT GLUCOSE   Result Value Ref Range    POCT Glucose 201 (H) 74 - 99 mg/dL   POCT GLUCOSE   Result Value Ref Range    POCT Glucose 212 (H) 74 - 99 mg/dL   POCT GLUCOSE   Result Value Ref Range    POCT Glucose 213 (H) 74 - 99 mg/dL         Assessment/Plan      Assessment & Plan  Acute ischemic stroke (Multi)    Valentinaesteban Fontanez is a 59-year-old female with PMH of HTN, T2DM, acute viral meningitis with a generalized  tonic-clonic seizure, and smoking history (~1PPD for 10 years, quit 7 years ago) who presented with right-sided weakness and slurred speech with LKW night prior around 2100. NIHSS 10 (LOCq 1, Facial paresis 2, RUE No effort 3, RLE Effort versus gravity 2, Dysarthria 1, Sensory 1). CT Head with prior left lacunar infarction and CT Angio was unremarkable. MRI Brain Stroke Protocol showed an acute to subacute left corona radiata ischemic infarction (volume 1.5 mL (cm³). She was not a candidate for TNK because outside of the window and no mechanical thrombectomy because no LVO. Her hemoglobin A1c 8.1%. LDL was unable to be calculated in setting of hyper-triglyceridemia (498) so started on omega-3 fatty acids. Urine toxicology positive for cocaine. Etiology is small-vessel disease given risk factors superimposed with vasoconstriction from cocaine. She was started on Aspirin and Atorvastatin for secondary stroke prevention. Now complete stroke work up.     Stroke Classification:  Type: Ischemic stroke  Subtype/etiology: Small vessel with vasoconstriction Vs cocaines  Vessels involved: Lenticulostriate   Neurological manifestations:  Pre-Intervention Deficits: NIHSS 10 (LOCq 1, Facial paresis 2, RUE No effort 3, RLE Effort versus gravity 2, Dysarthria 1, Sensory 1)  Pre-stroke mRS: 0  Initial treatment: Aspirin 81 mg  Anti-platelets or Anti-coagulation management: Aspirin  Vascular Risk Factors: HTN, T2DM, previous smoker  Antiplatelet/antithrombotic plan for stroke prevention: Will consider DAPT  VTE prophylaxis: Lovenox 40 mg daily  ----------  Update 09/27/24  - c/w lisinopril 40mg, hydrochlorothiazide 25mg BID, coreg 6.25mg BID  - Goal SBP<180  - Pending dispo    To follow up:  - BP control, can consider adding amlodipine  - Glucose control  - Dispo    Dispo: High intensity  ---------     #Left corona radiata infarction  :: Hemoglobin A1c 8.1%, LDL unable to be calculated, Urine Toxicology cocaine-positive  ::  Triglycerides: 498  :: EKG: NSR  :: TTE (9/23): LVEF 69%, impaired relaxation pattern of LV diastolic filling, moderate concentric LVH, no PFO, normal LA size  - Admit to floor with Telemetry  - Aspirin 81 mg daily  - Atorvastatin 40 mg nightly  - Started omega 3 for elevated triglycerides.  - PT & OT evaluation     #HTN  :: Permissive Hypertension < 220 for 24-48 hours (9/21-9/22)  - PRN Hydralazine and Labetolol  - Hold home Coreg, and HCTZ   - Restart home lisinopril 40mg 9/23, goal SBP<180  - Increase home to hydrochlorothiazide 25mg BID  - Restart low dose coreg 6.25 BID     #T2DM  - Lantus 18U nightly (home Lantus 25U nightly)  - SSI with POCT BG  - Urinalysis with 3+ protein consistent with diabetic nephropathy: Will consider starting ACE-Inhibitor once off permissive hypertension     FENGI  Diet: Adult diet Regular, Consistent Carb, Cardiac; CCD 60 gm/meal; Thin 0; 70 gm fat; 2 - 3 grams Sodium  Bowel Regimen: MiraLAX 17g daily     Vascular Risk Factor modification goals:  Blood pressure goals: avoid hypotension SBP <100 that could worsen cerebral perfusion, Ischemic stroke- early permissive hypertension SBP < 220 mmHg with cautious inpatient lowering  Lipid Goals: education on healthy diet and statin therapy to maintain or achieve goal LDL-cholesterol < 70mg  Glucose Goals: early treatment of hyperglycemia to goal glucose 140-180 mg/dl with long-term goal A1c < 7%   Smoking Cessation and Education  Assessment for Rehabilitation needs   Patient and family education on signs and symptoms of stroke, calling 911, healthy strategies for stroke prevention.    CODE STATUS: Full Code (Confirmed on admission)  Neck of Kin: Aparna Fontanez (Mother): [phone]      Robbi Quigley MD  Neurology PGY-2

## 2024-09-27 NOTE — SIGNIFICANT EVENT
Fall note    The patient has a fall. The patient was trying to stand up to prepare her clothes for the transportation to the rehab. The patient tried to stand up but felt weak. She was grabbing the bed and slowly dropping herself. The patient was found down at 1:05 pm. The patient endorsed lying down for 7 minutes. Denies any trauma to the head, body or extremities.     Objective:  - /76 mmHg, HR 78, Temp 35.8, SpO2 96%  - Last glucose check 11am 199    Physical examination:  - General appearance: Alert, oriented, cooperative, not in distress  - Mental status: Aox4, no tenderness in the body or head  - Extremities: No obvious bleeding or bruise, no swelling or edema  - Neurological examination:   CN 2: Pupil 3mm BRTL, equal  CN 3: Full EOM  Motor power: LUE 5/5, RUE 0/5, LLE 5/5, RLE 2/5  No sensory deficit    Impression: Fall    Plan:  - The patient does not have any changes in neurological exam and no trauma  - Will monitor for now  - Patient is safe for discharge to acute rehab

## 2024-09-27 NOTE — DISCHARGE INSTRUCTIONS
Please follow up with your primary care doctor. Go directly to rehab and take your medications as indicated. Return if concerning symptoms, as discussed.

## 2024-09-27 NOTE — PROGRESS NOTES
Physical Therapy                 Therapy Communication Note    Patient Name: Valentina Fontanez  MRN: [id number]  Department: [place] [id number]  Room: [address] [place] [address]  Today's Date: 9/27/2024     Discipline: Physical Therapy    Missed Visit Reason: Missed Visit Reason: Other (Comment) (pt currently being discharged to rehab)    Missed Time: Attempt    Comment: 13:55pm

## 2024-09-27 NOTE — ED TRIAGE NOTES
PT discharged from [facility] 9/27 for a stroke. Fell getting out of bed. PT was in route to rehab however they would not accept her until a CT was complete because she is on thinner. She has rt sided deficits from the stroke such as facial droop, slurred speech, rt sided weakness in arm and leg.

## 2024-09-27 NOTE — DISCHARGE SUMMARY
Discharge Diagnosis  Acute ischemic stroke (Multi)    Problem List  Assessment & Plan  Acute ischemic stroke (Multi)      Valentina Fontanez is a 59 y.o. female who presented to the hospital with right sided weakness and slurred speech. They were diagnosed with a stroke.  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     COMPARISON: 09/21/2024 CT.   ACCESSION NUMBER(S): [id number]   ORDERING CLINICIAN: SHUKRI ONEAL   TECHNIQUE: Axial T2, FLAIR, DWI, gradient echo T2 and sagittal and coronal T1 weighted images of brain were acquired.   FINDINGS: Moderately sized region of restricted diffusion in the left corona radiata extending inferiorly into the left basal ganglia with involvement of the posterior limb of the left internal capsule with smaller additional focus of restricted diffusions in FLAIR hyperintensity involving the left centrum semiovale. No midline shift. No pathologic parenchymal increased susceptibility. Small lacunar infarct anterior aspect right thalamus. Moderate nonspecific periventricular and subcortical T2 FLAIR hyperintensities may reflect chronic small vessel ischemic changes given the age of the patient. Major intracranial flow voids are patent. Visualized paranasal sinuses and mastoid air cells are clear       Moderate acute to subacute infarct involving the left corona radiata, posterior limb left internal capsule and left basal ganglia with smaller additional focus in the left centrum  semiovale. No hemorrhagic transformation or mass effect. Please correlate clinically.   MACRO: None   Signed by: Emmanuel Zamora 9/21/2024 2:16 PM Dictation workstation:   [id number]   No CT head results found for the past 14 days  Transthoracic Echo (TTE) Complete    Result Date: 9/23/2024   [facility], [address] [place] [address]                Tel [phone] and Fax [phone] TRANSTHORACIC ECHOCARDIOGRAM REPORT  Patient Name:      KILEY Kumar Physician:    [id number] Masoud Bradford MD Study Date:        9/23/2024            Ordering Provider:    [id number] CONRAD ESCOTO MRN/PID:           [id number]             Fellow: Accession#:        [id number]         Nurse:                Shayla Metzger RN Date of Birth/Age: 1965 / 59 years  Sonographer:          Racheal CORDOVA Gender:            F                    Additional Staff: Height:            172.00 cm            Admit Date:           9/21/2024 Weight:            81.65 kg             Admission Status:     Inpatient -                                                               Priority discharge BSA / BMI:         1.95 m2 / 27.60      Encounter#:           [id number]                    kg/m2 Blood Pressure:    198/114 mmHg         Department Location:  [facility] Non                                                               Invasive Study Type:    TRANSTHORACIC ECHO (TTE) COMPLETE Diagnosis/ICD: Cerebral Infarction, unspecified-I63.9 Indication:    CVA (Cerebracular Accident) CPT Code:      Echo Complete w Full Doppler-40095 Patient History: Diabetes:          Yes Pertinent History: HTN, Hyperlipidemia and CVA. Pneumonia. Study Detail: The following Echo studies were performed: 2D, M-Mode, Doppler and               color flow. Technically challenging study due to prominent lung               artifact. Agitated saline used as a contrast agent for  intraseptal               flow evaluation.  PHYSICIAN INTERPRETATION: Left Ventricle: Left ventricular ejection fraction is normal, calculated by Valdez's biplane at 69%. There are no regional left ventricular wall motion abnormalities. The left ventricular cavity size is normal. There is moderate concentric left ventricular hypertrophy. Spectral Doppler shows an impaired relaxation pattern of left ventricular diastolic filling. Left Atrium: The left atrium is normal in size. There is no evidence of a patent foramen ovale. A bubble study using agitated saline was performed. Bubble study is negative. Right Ventricle: The right ventricle is normal in size. There is normal right ventricular global systolic function. Right Atrium: The right atrium is normal in size. Aortic Valve: The aortic valve is structurally normal. There is minimal aortic valve cusp calcification. The aortic valve dimensionless index is 0.88. There is trace aortic valve regurgitation. The peak instantaneous gradient of the aortic valve is 8.5 mmHg. The mean gradient of the aortic valve is 4.0 mmHg. Mitral Valve: The mitral valve is normal in structure. There is trace mitral valve regurgitation. Tricuspid Valve: The tricuspid valve is structurally normal. There is trace tricuspid regurgitation. Pulmonic Valve: The pulmonic valve is structurally normal. There is physiologic pulmonic valve regurgitation. Pericardium: Trivial pericardial effusion. Aorta: The aortic root is normal. The Ao Sinus is 3.30 cm. The Asc Ao is 3.40 cm. Systemic Veins: The inferior vena cava appears normal in size, with IVC inspiratory collapse greater than 50%. In comparison to the previous echocardiogram(s): There are no prior studies on this patient for comparison purposes.  CONCLUSIONS:  1. Left ventricular ejection fraction is normal, calculated by Valdez's biplane at 69%.  2. Spectral Doppler shows an impaired relaxation pattern of left ventricular diastolic filling.   3. There is moderate concentric left ventricular hypertrophy.  4. There is normal right ventricular global systolic function.  5. There is no evidence of a patent foramen ovale. QUANTITATIVE DATA SUMMARY:  2D MEASUREMENTS:          Normal Ranges: Ao Root d:       3.30 cm  (2.0-3.7cm) LAs:             2.70 cm  (2.7-4.0cm) IVSd:            1.50 cm  (0.6-1.1cm) LVPWd:           1.50 cm  (0.6-1.1cm) LVIDd:           3.90 cm  (3.9-5.9cm) LVIDs:           2.40 cm LV Mass Index:   115 g/m2 LVEDV Index:     46 ml/m2 LV % FS          38.5 %  LA VOLUME:                    Normal Ranges: LA Vol A4C:        35.3 ml    (22+/-6mL/m2) LA Vol A2C:        38.6 ml LA Vol BP:         37.4 ml LA Vol Index A4C:  18.1ml/m2 LA Vol Index A2C:  19.8 ml/m2 LA Vol Index BP:   19.2 ml/m2 LA Area A4C:       13.5 cm2 LA Area A2C:       14.3 cm2 LA Major Axis A4C: 4.4 cm LA Major Axis A2C: 4.5 cm LA Volume Index:   19.2 ml/m2 LA Vol A4C:        34.1 ml LA Vol A2C:        35.7 ml LA Vol Index BSA:  17.9 ml/m2  RA VOLUME BY A/L METHOD:           Normal Ranges: RA Vol A4C:              12.7 ml   (8.3-19.5ml) RA Vol Index A4C:        6.5 ml/m2 RA Area A4C:             7.2 cm2 RA Major Axis A4C:       3.5 cm  AORTA MEASUREMENTS:         Normal Ranges: Ao Sinus, d:        3.30 cm (2.1-3.5cm) Asc Ao, d:          3.40 cm (2.1-3.4cm)  LV SYSTOLIC FUNCTION BY 2D PLANIMETRY (MOD):                      Normal Ranges: EF-A4C View:    74 % (>=55%) EF-A2C View:    68 % EF-Biplane:     69 % LV EF Reported: 69 %  LV DIASTOLIC FUNCTION:             Normal Ranges: MV Peak E:             0.72 m/s    (0.7-1.2 m/s) MV e'                  0.046 m/s   (>8.0) MV lateral e'          0.05 m/s MV medial e'           0.05 m/s MV A Dur:              124.00 msec E/e' Ratio:            15.69       (<8.0) PulmV Sys Fabricio:         34.60 cm/s PulmV Cobb Fabricio:        21.40 cm/s PulmV S/D Fabricio:         1.60 PulmV A Revs Fabricio:      26.90 cm/s PulmV A Revs Dur:      98.00 msec  MITRAL  VALVE:          Normal Ranges: MV DT:        219 msec (150-240msec)  AORTIC VALVE:                     Normal Ranges: AoV Vmax:                1.46 m/s (<=1.7m/s) AoV Peak P.5 mmHg (<20mmHg) AoV Mean P.0 mmHg (1.7-11.5mmHg) LVOT Max Fabricio:            1.14 m/s (<=1.1m/s) AoV VTI:                 25.10 cm (18-25cm) LVOT VTI:                22.10 cm LVOT Diameter:           1.80 cm  (1.8-2.4cm) AoV Area, VTI:           2.24 cm2 (2.5-5.5cm2) AoV Area,Vmax:           1.99 cm2 (2.5-4.5cm2) AoV Dimensionless Index: 0.88  RIGHT VENTRICLE: RV Basal 2.40 cm RV Mid   2.40 cm RV Major 5.6 cm TAPSE:   21.6 mm RV s'    0.12 m/s  TRICUSPID VALVE/RVSP:         Normal Ranges: Est. RA Pressure:     3 mmHg IVC Diam:             1.51 cm  PULMONIC VALVE:          Normal Ranges: PV Accel Time:  90 msec  (>120ms) PV Max Fabricio:     1.2 m/s  (0.6-0.9m/s) PV Max P.4 mmHg PV Mean PG:     3.0 mmHg PV VTI:         22.90 cm  Pulmonary Veins: PulmV A Revs Dur: 98.00 msec PulmV A Revs Fabricio: 26.90 cm/s PulmV Cobb Fabricio:   21.40 cm/s PulmV S/D Fabricio:    1.60 PulmV Sys Fabricio:    34.60 cm/s  [id number] Masoud Bradford MD Electronically signed on 2024 at 10:27:42 AM  ** Final **        Results from last 7 days   Lab Units 24  1217   HEMOGLOBIN A1C % 8.1*     BNP   Date/Time Value Ref Range Status   2024 12:17 PM 30 0 - 99 pg/mL Final       Test Results Pending At Discharge  Pending Labs       Order Current Status    Extra Urine Gray Tube Collected (24)    Urinalysis with Reflex Culture and Microscopic In process            Hospital Course  Valentina Fontanez is a 59 y.o. female with PMH of HTN, T2DM, acute viral meningitis with a generalized tonic-clonic seizures, and smoking history (~1PPD for 10 years, quit 7 years ago) who presented with right-sided weakness and slurred speech that started around 0400 that morning with LKW 2100 night prior. NIHSS 10 (LOCq 1, Facial paresis 2, RUE No effort 3, RLE Effort  versus gravity 2, Dysarthria 1, Sensory 1). CT Head was negative for an acute process; mild gliosis of the left corona radiata suggestive of prior infarction. CT Angio was unremarkable. Home medications include lisinopril, Coreg, Lantus, and hydrochlorothiazide; she had not taken her hydrochlorothiazide or Carvedilol in a month. MRI Brain Stroke Protocol showed an acute to subacute left corona radiata ischemic infarction (volume 1.5 mL (cm³). She was not a candidate for TNK because outside of the window and no mechanical thrombectomy because no LVO.    Her hemoglobin A1c 8.1%. LDL was unable to be calculated in setting of hyper-triglyceridemia (498) so started on omega-3 fatty acids. Urine toxicology positive for cocaine. EKG was NSR. Etiology is small-vessel disease given risk factors superimposed with vasoconstriction from cocaine. She was started on Aspirin 81 mg and Atorvastatin 80 mg for secondary stroke prevention. Echocardiogram showed LVEF 69%, impaired relaxation pattern of LV diastolic filling, moderate concentric LVH, no PFO, normal LA size . After 48 hours of permissive hypertension,  her blood pressure medications were titrated up with the current regimen of lisinopril 40mg, hydrochlorothiazide 25mg BID, coreg 6.25mg BID.     She was cleared for a regular diet; started low-sodium and carb-control. She was evaluated by Physical Therapy and Occupational Therapy who both recommended Acute rehab. She was discharged to acute rehab in stable condition.    Course complicated by mechanical fall. No injury, no head trauma.    Follow up appointments  :: Stroke neurology  - Follow up on management of Left corona radiata infarct    :: PCP  - Follow up post hospitalization and medical optimization of stroke risk factors    Home Medications     Medication List      START taking these medications    • aspirin 81 mg chewable tablet; Chew 1 tablet (81 mg) once daily.  • atorvastatin 80 mg tablet; Commonly known as:  Lipitor; Take 1 tablet (80   mg) by mouth once daily at bedtime.  • icosapent ethyL 1 gram capsule; Commonly known as: Vascepa; Take 2   capsules (2 g) by mouth 2 times daily (morning and late afternoon).  • melatonin 5 mg tablet; Take 1 tablet (5 mg) by mouth once daily.     CHANGE how you take these medications    • carvedilol 6.25 mg tablet; Commonly known as: Coreg; Take 1 tablet (6.25   mg) by mouth 2 times a day.; What changed: medication strength, how much   to take  • hydroCHLOROthiazide 25 mg tablet; Commonly known as: HYDRODiuril; Take 1   tablet (25 mg) by mouth 2 times a day.; What changed: when to take this     CONTINUE taking these medications    • Certavite-Antioxidant  mg-mcg tablet; Generic drug: multivitamin   with minerals; Take 1 tablet by mouth once daily.  • insulin glargine 100 unit/mL (3 mL) pen; Commonly known as: Lantus;   Inject 25 Units under the skin once daily at bedtime. Take as directed per   insulin instructions.  • lisinopril 40 mg tablet; Take 1 tablet (40 mg) by mouth once daily.     ASK your doctor about these medications    • folic acid 1 mg tablet; Commonly known as: Folvite; Take 1 tablet (1 mg)   by mouth once daily. Do not start before October 7, 2023.       Pertinent Physical Exam At Time of Discharge  Physical Exam  Neurological Exam    Vitals:    09/27/24 1305   BP: 157/76   Pulse: 78   Resp:    Temp: 35.8 °C (96.4 °F)   SpO2: 96%     Neurological examination:   CN 2: Pupil 3mm BRTL, equal  CN 3: Full EOM  Motor power: LUE 5/5, RUE 0/5, LLE 5/5, RLE 2/5  No sensory deficit    09/27/24 @ 8 AM  NIHSS:   - Level of consciousness: 0 = Alert  - LOC Question: 0 = Both correct  - LOC Commands: 0 = Obeys both  - Best Gaze: 0 = Normal  - Visual Field: 0 = No visual loss  - Facial Paresis: 2 = Complete UMN  - LUE: 0 = No drift; 10 sec  - RUE: 4 = No movement  - LLE: 0 = No drift; 5 sec  - RLE: 2 = Effort vs gravity  - Limb Ataxia: 0 = Absent  - Sensory: 0 = Absent  - Best  Language: 0 = No aphasia  - Dysarthria: 1 = Mild-moderate  - Neglect: 0 = None  TOTAL: 9    Outpatient Follow-Up  No future appointments.    Robbi Quigley MD

## 2024-09-27 NOTE — ED PROVIDER NOTES
HPI   Chief Complaint   Patient presents with    Fall       HPI  Patient is a 5o-year-old female with a past medical history significant for discharged from the hospital earlier today after CVA with residual right-sided weakness and slurred speech.  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  She was discharged with anticoagulation.  She was not a candidate for TNK because she was outside the window and there were no signs of LVO.  Per report she slid off the bed prior to discharge from [facility].  According to EMS and the report they received patient was assessed by a physician there and they did not feel that she needed further CT imaging.  Is alert and oriented x 4 and insist that she slid slowly off the bed did not strike her head and has no new neurologic deficits.  According to reports she is on anticoagulation but per review of summary of discharge patient was treated with aspirin and atorvastatin for secondary stroke prevention.  She was not a candidate for TNK.      PMHx: As above  PSHx: Denies pertinent  FamilyHx: Denies pertinent  SocialHx: Denies  Allergies: Per EMR  Medications: See Medication Reconciliation     ROS  As above otherwise denies    Physical Exam    GENERAL: Awake and Alert, No Acute Distress  HEENT: AT/NC, PERRL, EOMI, Normal Oropharynx, No Signs of Dehydration  NECK: Normal Inspection, No JVD  CARDIOVASCULAR: RRR, No M/R/G  RESPIRATORY: CTA Bilaterally, No Wheezes, Rales or Rhonchi, Chest Wall Non-tender  ABDOMEN: Soft, non-tender abdomen, Normal Bowel Sounds, No Distention  BACK: No CVA Tenderness  SKIN: Normal Color, Warm, Dry, No Rashes   EXTREMITIES: Non-Tender, Full ROM, No Pedal Edema  NEURO: A&O x 3, slurred speech, right-sided hemiparesis    Nursing Assessment and Vitals Reviewed    Medical Decision  Patient is a 5o-year-old female with a past medical history significant for discharged from the hospital earlier today after CVA with residual right-sided  weakness and slurred speech.  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  She was discharged with anticoagulation.  She was not a candidate for TNK because she was outside the window and there were no signs of LVO.  Per report she slid off the bed prior to discharge from [facility].  According to EMS and the report they received patient was assessed by a physician there and they did not feel that she needed further CT imaging.  Is alert and oriented x 4 and insist that she slid slowly off the bed did not strike her head and has no new neurologic deficits.  According to reports she is on anticoagulation but per review of summary of discharge patient was treated with aspirin and atorvastatin for secondary stroke prevention.  She was not a candidate for TNK.    On evaluation she is alert and oriented x 3-4.  She has right-sided hemiparesis.  Speech is slightly slurred and these appear to be occurring since the initial insult.  CT is ordered per requirement of rehab facility.      CT imaging showed no acute intracranial hemorrhage or fracture.  She has evolving subacute infarct of the left corona radiata, posterior limb of the left internal capsule and left basal ganglia.  Patient was discussed with Dr. Leo via secure chat who agrees these are expected and patient is stable to be discharged to rehab at this time.  Patient remained in stable condition while in the ED and is discharged to rehab facility.  She will follow-up with her doctor and is educated on signs and symptoms that should prompt an immediate turn to emergency room.                  Patient History   No past medical history on file.  No past surgical history on file.  No family history on file.  Social History     Tobacco Use    Smoking status: Never     Passive exposure: Never    Smokeless tobacco: Never   Vaping Use    Vaping status: Unknown   Substance Use Topics    Alcohol use: Yes     Comment: unknown per family    Drug use:  Never       Physical Exam   ED Triage Vitals [09/27/24 1548]   Temperature Heart Rate Respirations BP   36.9 °C (98.4 °F) 71 16 (!) 163/95      Pulse Ox Temp Source Heart Rate Source Patient Position   95 % Oral Monitor --      BP Location FiO2 (%)     -- --       Physical Exam      ED Course & MDM   ED Course as of 09/27/24 1738   Fri Sep 27, 2024   1729 Patient refusing CT may be discharged she is competent. [MT]      ED Course User Index  [MT] Tyler Menendez MD         Diagnoses as of 09/27/24 1738   Fall, initial encounter                 No data recorded                                 Medical Decision Making      Procedure  Procedures     Madhavi Maldonado MD  09/27/24 2009

## 2024-09-28 VITALS
SYSTOLIC BLOOD PRESSURE: 175 MMHG | TEMPERATURE: 98.4 F | HEART RATE: 83 BPM | DIASTOLIC BLOOD PRESSURE: 91 MMHG | OXYGEN SATURATION: 99 % | RESPIRATION RATE: 16 BRPM

## 2024-09-28 PROBLEM — I63.81 LACUNAR INFARCT, ACUTE (MULTI): Status: ACTIVE | Noted: 2024-09-28

## 2024-09-28 PROBLEM — Z74.09 IMPAIRED MOBILITY AND ACTIVITIES OF DAILY LIVING: Status: ACTIVE | Noted: 2024-09-28

## 2024-09-28 PROBLEM — Z78.9 IMPAIRED MOBILITY AND ACTIVITIES OF DAILY LIVING: Status: ACTIVE | Noted: 2024-09-28

## 2024-09-28 PROBLEM — G81.91 RIGHT HEMIPLEGIA (MULTI): Status: ACTIVE | Noted: 2024-09-28

## 2024-09-28 PROCEDURE — 80053 COMPREHEN METABOLIC PANEL: CPT

## 2024-09-28 PROCEDURE — 85027 COMPLETE CBC AUTOMATED: CPT

## 2024-09-28 ASSESSMENT — PAIN SCALES - GENERAL
PAINLEVEL_OUTOF10: 0 - NO PAIN
PAINLEVEL_OUTOF10: 0 - NO PAIN

## 2024-09-28 NOTE — PROGRESS NOTES
Patient was referred to social work on this date to assist with dc planning from the ED back to [facility].   Earlier today the patient had a fall. The patient was trying to stand up to prepare her clothes for the transportation to the rehab. The patient tried to stand up but felt weak. She was grabbing the bed and slowly dropping herself. The patient was found down at 1:05 pm. The patient endorsed lying down for 7 minutes.     She was transferred from [facility] to [facility] and upon arrival after it was determined she had a fall she was sent to [facility] ED for a CT scan before [facility] would accept her back. The patient initially refused at CT scan and then indicated she would prefer to go home. She also declined admission into OBS status for one night in lieu of CT scan as suggested by [facility].     I discussed patient situation with ED DrJessy Who indicated pt agreed now to a CT scan. The pt can return to  Rehab if cleared by medical provider.     Currently, we are waiting for medical clearance.     Ida John is the liaison from [facility] [phone] Patient was dc'd from ED after CT scan. Pt. Went to [facility].

## 2024-09-29 ENCOUNTER — LAB REQUISITION (OUTPATIENT)
Dept: LAB | Facility: HOSPITAL | Age: 59
End: 2024-09-29

## 2024-09-29 DIAGNOSIS — Z51.89 ENCOUNTER FOR OTHER SPECIFIED AFTERCARE: ICD-10-CM

## 2024-09-29 LAB
ALBUMIN SERPL BCP-MCNC: 3.9 G/DL (ref 3.4–5)
ALP SERPL-CCNC: 84 U/L (ref 33–136)
ALT SERPL W P-5'-P-CCNC: 25 U/L (ref 7–45)
ANION GAP SERPL CALC-SCNC: 23 MMOL/L (ref 10–20)
AST SERPL W P-5'-P-CCNC: 28 U/L (ref 9–39)
BILIRUB SERPL-MCNC: 0.7 MG/DL (ref 0–1.2)
BUN SERPL-MCNC: 35 MG/DL (ref 6–23)
CALCIUM SERPL-MCNC: 9.8 MG/DL (ref 8.6–10.6)
CHLORIDE SERPL-SCNC: 98 MMOL/L (ref 98–107)
CO2 SERPL-SCNC: 23 MMOL/L (ref 21–32)
CREAT SERPL-MCNC: 1.04 MG/DL (ref 0.5–1.05)
EGFRCR SERPLBLD CKD-EPI 2021: 62 ML/MIN/1.73M*2
ERYTHROCYTE [DISTWIDTH] IN BLOOD BY AUTOMATED COUNT: 12.3 % (ref 11.5–14.5)
GLUCOSE SERPL-MCNC: 166 MG/DL (ref 74–99)
HCT VFR BLD AUTO: 41.9 % (ref 36–46)
HGB BLD-MCNC: 13.5 G/DL (ref 12–16)
MCH RBC QN AUTO: 30.6 PG (ref 26–34)
MCHC RBC AUTO-ENTMCNC: 32.2 G/DL (ref 32–36)
MCV RBC AUTO: 95 FL (ref 80–100)
NRBC BLD-RTO: 0 /100 WBCS (ref 0–0)
PLATELET # BLD AUTO: 306 X10*3/UL (ref 150–450)
POTASSIUM SERPL-SCNC: 3.7 MMOL/L (ref 3.5–5.3)
PROT SERPL-MCNC: 6.9 G/DL (ref 6.4–8.2)
RBC # BLD AUTO: 4.41 X10*6/UL (ref 4–5.2)
SODIUM SERPL-SCNC: 140 MMOL/L (ref 136–145)
WBC # BLD AUTO: 9.5 X10*3/UL (ref 4.4–11.3)

## 2024-10-01 ENCOUNTER — LAB REQUISITION (OUTPATIENT)
Dept: LAB | Facility: HOSPITAL | Age: 59
End: 2024-10-01
Payer: COMMERCIAL

## 2024-10-01 DIAGNOSIS — Z51.89 ENCOUNTER FOR OTHER SPECIFIED AFTERCARE: ICD-10-CM

## 2024-10-01 LAB
ANION GAP SERPL CALC-SCNC: 13 MMOL/L (ref 10–20)
BUN SERPL-MCNC: 30 MG/DL (ref 6–23)
CALCIUM SERPL-MCNC: 9.3 MG/DL (ref 8.6–10.3)
CHLORIDE SERPL-SCNC: 103 MMOL/L (ref 98–107)
CO2 SERPL-SCNC: 27 MMOL/L (ref 21–32)
CREAT SERPL-MCNC: 0.67 MG/DL (ref 0.5–1.05)
EGFRCR SERPLBLD CKD-EPI 2021: >90 ML/MIN/1.73M*2
ERYTHROCYTE [DISTWIDTH] IN BLOOD BY AUTOMATED COUNT: 11.7 % (ref 11.5–14.5)
GLUCOSE SERPL-MCNC: 171 MG/DL (ref 74–99)
HCT VFR BLD AUTO: 39.1 % (ref 36–46)
HGB BLD-MCNC: 13.1 G/DL (ref 12–16)
MCH RBC QN AUTO: 31 PG (ref 26–34)
MCHC RBC AUTO-ENTMCNC: 33.5 G/DL (ref 32–36)
MCV RBC AUTO: 93 FL (ref 80–100)
NRBC BLD-RTO: 0 /100 WBCS (ref 0–0)
PLATELET # BLD AUTO: 292 X10*3/UL (ref 150–450)
POTASSIUM SERPL-SCNC: 3.5 MMOL/L (ref 3.5–5.3)
RBC # BLD AUTO: 4.22 X10*6/UL (ref 4–5.2)
SODIUM SERPL-SCNC: 139 MMOL/L (ref 136–145)
WBC # BLD AUTO: 8.3 X10*3/UL (ref 4.4–11.3)

## 2024-10-01 PROCEDURE — 85027 COMPLETE CBC AUTOMATED: CPT

## 2024-10-01 PROCEDURE — 80048 BASIC METABOLIC PNL TOTAL CA: CPT

## 2024-10-07 ENCOUNTER — LAB REQUISITION (OUTPATIENT)
Dept: LAB | Facility: HOSPITAL | Age: 59
End: 2024-10-07
Payer: COMMERCIAL

## 2024-10-07 DIAGNOSIS — Z51.89 ENCOUNTER FOR OTHER SPECIFIED AFTERCARE: ICD-10-CM

## 2024-10-07 LAB
ANION GAP SERPL CALC-SCNC: 14 MMOL/L (ref 10–20)
ATRIAL RATE: 73 BPM
BUN SERPL-MCNC: 14 MG/DL (ref 6–23)
CALCIUM SERPL-MCNC: 9.2 MG/DL (ref 8.6–10.3)
CHLORIDE SERPL-SCNC: 102 MMOL/L (ref 98–107)
CO2 SERPL-SCNC: 28 MMOL/L (ref 21–32)
CREAT SERPL-MCNC: 0.56 MG/DL (ref 0.5–1.05)
EGFRCR SERPLBLD CKD-EPI 2021: >90 ML/MIN/1.73M*2
ERYTHROCYTE [DISTWIDTH] IN BLOOD BY AUTOMATED COUNT: 11.8 % (ref 11.5–14.5)
GLUCOSE SERPL-MCNC: 142 MG/DL (ref 74–99)
HCT VFR BLD AUTO: 37.6 % (ref 36–46)
HGB BLD-MCNC: 12.7 G/DL (ref 12–16)
MCH RBC QN AUTO: 30.6 PG (ref 26–34)
MCHC RBC AUTO-ENTMCNC: 33.8 G/DL (ref 32–36)
MCV RBC AUTO: 91 FL (ref 80–100)
NRBC BLD-RTO: 0 /100 WBCS (ref 0–0)
P AXIS: 65 DEGREES
P OFFSET: 207 MS
P ONSET: 155 MS
PLATELET # BLD AUTO: 283 X10*3/UL (ref 150–450)
POTASSIUM SERPL-SCNC: 3.3 MMOL/L (ref 3.5–5.3)
PR INTERVAL: 138 MS
Q ONSET: 224 MS
QRS COUNT: 12 BEATS
QRS DURATION: 78 MS
QT INTERVAL: 416 MS
QTC CALCULATION(BAZETT): 458 MS
QTC FREDERICIA: 444 MS
R AXIS: -14 DEGREES
RBC # BLD AUTO: 4.15 X10*6/UL (ref 4–5.2)
SODIUM SERPL-SCNC: 141 MMOL/L (ref 136–145)
T AXIS: 135 DEGREES
T OFFSET: 432 MS
VENTRICULAR RATE: 73 BPM
WBC # BLD AUTO: 9.9 X10*3/UL (ref 4.4–11.3)

## 2024-10-07 PROCEDURE — 85027 COMPLETE CBC AUTOMATED: CPT

## 2024-10-07 PROCEDURE — 80048 BASIC METABOLIC PNL TOTAL CA: CPT

## 2024-10-14 ENCOUNTER — LAB REQUISITION (OUTPATIENT)
Dept: LAB | Facility: HOSPITAL | Age: 59
End: 2024-10-14
Payer: COMMERCIAL

## 2024-10-14 DIAGNOSIS — Z51.89 ENCOUNTER FOR OTHER SPECIFIED AFTERCARE: ICD-10-CM

## 2024-10-14 LAB
ANION GAP SERPL CALC-SCNC: 14 MMOL/L (ref 10–20)
BASOPHILS # BLD AUTO: 0.02 X10*3/UL (ref 0–0.1)
BASOPHILS NFR BLD AUTO: 0.2 %
BUN SERPL-MCNC: 17 MG/DL (ref 6–23)
CALCIUM SERPL-MCNC: 10 MG/DL (ref 8.6–10.3)
CHLORIDE SERPL-SCNC: 102 MMOL/L (ref 98–107)
CO2 SERPL-SCNC: 29 MMOL/L (ref 21–32)
CREAT SERPL-MCNC: 0.6 MG/DL (ref 0.5–1.05)
EGFRCR SERPLBLD CKD-EPI 2021: >90 ML/MIN/1.73M*2
EOSINOPHIL # BLD AUTO: 0.17 X10*3/UL (ref 0–0.7)
EOSINOPHIL NFR BLD AUTO: 1.9 %
ERYTHROCYTE [DISTWIDTH] IN BLOOD BY AUTOMATED COUNT: 11.8 % (ref 11.5–14.5)
GLUCOSE SERPL-MCNC: 145 MG/DL (ref 74–99)
HCT VFR BLD AUTO: 42.2 % (ref 36–46)
HGB BLD-MCNC: 14.4 G/DL (ref 12–16)
IMM GRANULOCYTES # BLD AUTO: 0.01 X10*3/UL (ref 0–0.7)
IMM GRANULOCYTES NFR BLD AUTO: 0.1 % (ref 0–0.9)
LYMPHOCYTES # BLD AUTO: 3.9 X10*3/UL (ref 1.2–4.8)
LYMPHOCYTES NFR BLD AUTO: 44 %
MCH RBC QN AUTO: 30.8 PG (ref 26–34)
MCHC RBC AUTO-ENTMCNC: 34.1 G/DL (ref 32–36)
MCV RBC AUTO: 90 FL (ref 80–100)
MONOCYTES # BLD AUTO: 0.49 X10*3/UL (ref 0.1–1)
MONOCYTES NFR BLD AUTO: 5.5 %
NEUTROPHILS # BLD AUTO: 4.28 X10*3/UL (ref 1.2–7.7)
NEUTROPHILS NFR BLD AUTO: 48.3 %
NRBC BLD-RTO: 0 /100 WBCS (ref 0–0)
PLATELET # BLD AUTO: 321 X10*3/UL (ref 150–450)
POTASSIUM SERPL-SCNC: 3.8 MMOL/L (ref 3.5–5.3)
RBC # BLD AUTO: 4.68 X10*6/UL (ref 4–5.2)
SODIUM SERPL-SCNC: 141 MMOL/L (ref 136–145)
WBC # BLD AUTO: 8.9 X10*3/UL (ref 4.4–11.3)

## 2024-10-14 PROCEDURE — 85025 COMPLETE CBC W/AUTO DIFF WBC: CPT

## 2024-10-14 PROCEDURE — 80048 BASIC METABOLIC PNL TOTAL CA: CPT

## 2024-10-16 ENCOUNTER — HOME HEALTH ADMISSION (OUTPATIENT)
Dept: HOME HEALTH SERVICES | Facility: HOME HEALTH | Age: 59
End: 2024-10-16
Payer: COMMERCIAL

## 2024-10-16 ENCOUNTER — DOCUMENTATION (OUTPATIENT)
Dept: HOME HEALTH SERVICES | Facility: HOME HEALTH | Age: 59
End: 2024-10-16

## 2024-10-16 NOTE — HH CARE COORDINATION
Home Care received a Referral for Nursing, Physical Therapy, Occupational Therapy, Speech Language Pathology, and Medical Social Work. We have processed the referral for a Start of Care on 10/18/24.     If you have any questions or concerns, please feel free to contact us at [phone]. Follow the prompts, enter your five digit zip code, and you will be directed to your care team on [facility] 3.

## 2024-10-17 PROCEDURE — RXMED WILLOW AMBULATORY MEDICATION CHARGE

## 2024-10-18 DIAGNOSIS — I10 HYPERTENSION, UNSPECIFIED TYPE: ICD-10-CM

## 2024-10-18 DIAGNOSIS — I63.9 ACUTE ISCHEMIC STROKE (MULTI): Primary | ICD-10-CM

## 2024-10-18 DIAGNOSIS — R41.0 DELIRIUM: ICD-10-CM

## 2024-10-18 DIAGNOSIS — E78.5 HYPERLIPIDEMIA, UNSPECIFIED HYPERLIPIDEMIA TYPE: ICD-10-CM

## 2024-10-18 PROCEDURE — RXMED WILLOW AMBULATORY MEDICATION CHARGE

## 2024-10-18 RX ORDER — FOLIC ACID 1 MG/1
1 TABLET ORAL DAILY
Qty: 60 TABLET | Refills: 2 | Status: CANCELLED | OUTPATIENT
Start: 2024-10-18

## 2024-10-19 ENCOUNTER — HOME CARE VISIT (OUTPATIENT)
Dept: HOME HEALTH SERVICES | Facility: HOME HEALTH | Age: 59
End: 2024-10-19
Payer: COMMERCIAL

## 2024-10-19 VITALS
TEMPERATURE: 97.9 F | SYSTOLIC BLOOD PRESSURE: 160 MMHG | HEART RATE: 85 BPM | OXYGEN SATURATION: 97 % | RESPIRATION RATE: 16 BRPM | DIASTOLIC BLOOD PRESSURE: 75 MMHG

## 2024-10-19 DIAGNOSIS — R41.0 DELIRIUM: ICD-10-CM

## 2024-10-19 PROCEDURE — G0299 HHS/HOSPICE OF RN EA 15 MIN: HCPCS

## 2024-10-19 ASSESSMENT — ENCOUNTER SYMPTOMS
DENIES PAIN: 1
MUSCLE WEAKNESS: 1
APPETITE LEVEL: GOOD
LAST BOWEL MOVEMENT: 67132

## 2024-10-19 ASSESSMENT — ACTIVITIES OF DAILY LIVING (ADL)
ENTERING_EXITING_HOME: ONE PERSON
OASIS_M1830: 05

## 2024-10-21 RX ORDER — LISINOPRIL 40 MG/1
40 TABLET ORAL DAILY
Qty: 90 TABLET | Refills: 0 | Status: SHIPPED | OUTPATIENT
Start: 2024-10-21 | End: 2025-01-19

## 2024-10-21 RX ORDER — AMLODIPINE BESYLATE 10 MG/1
10 TABLET ORAL NIGHTLY
Qty: 90 TABLET | Refills: 0 | Status: SHIPPED | OUTPATIENT
Start: 2024-10-21 | End: 2025-01-19

## 2024-10-21 RX ORDER — HYDROCHLOROTHIAZIDE 25 MG/1
25 TABLET ORAL 2 TIMES DAILY
Qty: 180 TABLET | Refills: 0 | Status: SHIPPED | OUTPATIENT
Start: 2024-10-21 | End: 2025-01-19

## 2024-10-21 RX ORDER — FOLIC ACID 1 MG/1
1 TABLET ORAL DAILY
Qty: 90 TABLET | Refills: 0 | Status: SHIPPED | OUTPATIENT
Start: 2024-10-21 | End: 2025-01-19

## 2024-10-21 RX ORDER — ATORVASTATIN CALCIUM 80 MG/1
80 TABLET, FILM COATED ORAL NIGHTLY
Qty: 90 TABLET | Refills: 0 | Status: SHIPPED | OUTPATIENT
Start: 2024-10-21

## 2024-10-21 RX ORDER — HYDRALAZINE HYDROCHLORIDE 25 MG/1
25 TABLET, FILM COATED ORAL 3 TIMES DAILY
Qty: 270 TABLET | Refills: 0 | Status: SHIPPED | OUTPATIENT
Start: 2024-10-21 | End: 2025-01-19

## 2024-10-22 ENCOUNTER — HOME CARE VISIT (OUTPATIENT)
Dept: HOME HEALTH SERVICES | Facility: HOME HEALTH | Age: 59
End: 2024-10-22
Payer: COMMERCIAL

## 2024-10-22 ENCOUNTER — PHARMACY VISIT (OUTPATIENT)
Dept: PHARMACY | Facility: CLINIC | Age: 59
End: 2024-10-22
Payer: COMMERCIAL

## 2024-10-22 VITALS — RESPIRATION RATE: 18 BRPM | HEART RATE: 85 BPM | OXYGEN SATURATION: 96 % | TEMPERATURE: 98.4 F

## 2024-10-22 PROCEDURE — G0151 HHCP-SERV OF PT,EA 15 MIN: HCPCS

## 2024-10-22 PROCEDURE — G0152 HHCP-SERV OF OT,EA 15 MIN: HCPCS

## 2024-10-23 RX ORDER — MULTIVITAMIN/IRON/FOLIC ACID 18MG-0.4MG
1 TABLET ORAL DAILY
Qty: 60 TABLET | Refills: 2 | OUTPATIENT
Start: 2024-10-23

## 2024-10-23 SDOH — HEALTH STABILITY: PHYSICAL HEALTH: EXERCISE ACTIVITIES SETS: 2

## 2024-10-23 SDOH — HEALTH STABILITY: PHYSICAL HEALTH: PHYSICAL EXERCISE: 10

## 2024-10-23 SDOH — HEALTH STABILITY: PHYSICAL HEALTH: PHYSICAL EXERCISE: SITTING, STANDING

## 2024-10-23 SDOH — HEALTH STABILITY: PHYSICAL HEALTH: EXERCISE TYPE: SKILLED THERAPEUTIC EXERCISES

## 2024-10-23 SDOH — HEALTH STABILITY: PHYSICAL HEALTH: EXERCISE ACTIVITY: SKILLED THERAPEUTIC EXERCISES

## 2024-10-23 SDOH — HEALTH STABILITY: PHYSICAL HEALTH: RESISTANCE: AS TOLERATED

## 2024-10-23 SDOH — HEALTH STABILITY: PHYSICAL HEALTH: EXERCISE ACTIVITY: GAIT TRAINING

## 2024-10-23 ASSESSMENT — ACTIVITIES OF DAILY LIVING (ADL)
CURRENT_FUNCTION: MAXIMUM ASSIST
AMBULATION ASSISTANCE ON FLAT SURFACES: 1
PHYSICAL TRANSFERS ASSESSED: 1
PHYSICAL_TRANSFERS_DEVICES: BODY SUPPORT
AMBULATION ASSISTANCE: 1
AMBULATION ASSISTANCE: MAXIMUM ASSIST
AMBULATION_DISTANCE/DURATION_TOLERATED: 50 FT

## 2024-10-23 ASSESSMENT — ENCOUNTER SYMPTOMS
PERSON REPORTING PAIN: PATIENT
MUSCLE WEAKNESS: 1
LIMITED RANGE OF MOTION: 1
DENIES PAIN: 1
ARTHRALGIAS: 1

## 2024-10-23 NOTE — HOME HEALTH
GOALS: PATIENT WILL DEMONSTRATE IMPROVED STRENGHT RUE AND RLE AND MOBILITY.          SUBJECTIVE: THE PATIENT REPORTS NO PAIN. PATIENT REPORTED DIFFICULTY WITH AMBULATION.          LIVING CONDITION: PATIENT IS CURRENTLY LIVING IN A ONE-STORY BUILDING WITH STAIRS. PATIENT IS CURRENTLY LIVING WITH HER MOTHER.          OBJECTIVE: MANUAL MUSCLE TESTING WAS PERFORMED TO DETERMINE BOTH CORE AND LE STRENGTH. PATIENT DEMONSTRATED 0/5 MUSCLE STRENGTH TO MARY LE'S. BALANCE TESTING WAS PERFORMED WHICH SHOWED PATIENT TO BE A HIGH FALL RISK. PATIENT WAS ABLE TO WALK 50 FEET WITH QUAD CANE, DEMONSTRATED DIFFICULTY WITH TRANSFERS FROM STS AND GAIT ABNORMALITIES ALONG WITH BALANCE IMPAIRMENT.          THERAPEUTIC ACTIVITIES: MMT, TINETTI, MOBILITY, AND GAIT ASSESSMENT COMPLETED          ASSESSMENT: DEMONSTRATED GROSSLY REDUCED STRENGTH AND ENDURANCE SECONDARY TO ASSOCIATED COMORBID CONDITIONS. PATIENT, PRESENTLY, IS A FALL RISK, CURRENTLY AMBULATING WITH A CANE. FALL PRECAUTIONS DISCUSSED.           PLAN: CONSIDER COMORBID FACTORS WHEN IMPLEMENTING POC. CONTINUE WITH GENERAL ENDURANCE AND STRENGTH TRAINING UE'S AND LE'S, GAIT TRAINING ACTIVITIES, BALANCE AND PROPRIOCEPTIVE TRAINING, AND FALL PREVENTION STRATEGIES AS PER PT POC. PROGRESS AS TOLERATED.

## 2024-10-24 ENCOUNTER — HOME CARE VISIT (OUTPATIENT)
Dept: HOME HEALTH SERVICES | Facility: HOME HEALTH | Age: 59
End: 2024-10-24
Payer: COMMERCIAL

## 2024-10-24 VITALS
HEART RATE: 80 BPM | RESPIRATION RATE: 18 BRPM | SYSTOLIC BLOOD PRESSURE: 160 MMHG | TEMPERATURE: 97.5 F | DIASTOLIC BLOOD PRESSURE: 98 MMHG

## 2024-10-24 PROCEDURE — G0300 HHS/HOSPICE OF LPN EA 15 MIN: HCPCS

## 2024-10-24 ASSESSMENT — ENCOUNTER SYMPTOMS
PERSON REPORTING PAIN: PATIENT
OCCASIONAL FEELINGS OF UNSTEADINESS: 1
DENIES PAIN: 1
CHANGE IN APPETITE: UNCHANGED
DENIES PAIN: 1
MUSCLE WEAKNESS: 1
APPETITE LEVEL: GOOD
PERSON REPORTING PAIN: PATIENT
LAST BOWEL MOVEMENT: 67136

## 2024-10-24 ASSESSMENT — PAIN SCALES - PAIN ASSESSMENT IN ADVANCED DEMENTIA (PAINAD)
FACIALEXPRESSION: 0 - SMILING OR INEXPRESSIVE.
CONSOLABILITY: 0 - NO NEED TO CONSOLE.
NEGVOCALIZATION: 0 - NONE.
NEGVOCALIZATION: 0
CONSOLABILITY: 0
BREATHING: 0
FACIALEXPRESSION: 0
BODYLANGUAGE: 0
TOTALSCORE: 0
BODYLANGUAGE: 0 - RELAXED.

## 2024-10-25 ENCOUNTER — HOME CARE VISIT (OUTPATIENT)
Dept: HOME HEALTH SERVICES | Facility: HOME HEALTH | Age: 59
End: 2024-10-25
Payer: COMMERCIAL

## 2024-10-25 PROCEDURE — G0152 HHCP-SERV OF OT,EA 15 MIN: HCPCS

## 2024-10-25 PROCEDURE — G0151 HHCP-SERV OF PT,EA 15 MIN: HCPCS

## 2024-10-25 ASSESSMENT — ENCOUNTER SYMPTOMS
PERSON REPORTING PAIN: PATIENT
DENIES PAIN: 1

## 2024-10-25 NOTE — HOME HEALTH
Patient was seen for occupational therapy home health treatment session. VSS. OT educated patient on the importance of consistent time to take medicines every day. OT also adapted the pill bottle closures for ease of one-handed use, no children are in the home. OT began a home exercise program that included self ranging of the R UE, stretching neck, and shoulder. Precautions were discussed for the home exercise program. Patient was told to do the exercises at edge of bed as long as there was no possibility of her slipping off of the bed and that she could maintain a good upright position. Next appointment will focus on patient wheelchair Procurement.

## 2024-10-27 RX ORDER — LISINOPRIL 40 MG/1
TABLET ORAL
Refills: 0 | OUTPATIENT
Start: 2024-10-27

## 2024-10-27 SDOH — HEALTH STABILITY: PHYSICAL HEALTH

## 2024-10-27 SDOH — HEALTH STABILITY: PHYSICAL HEALTH: EXERCISE ACTIVITIES SETS: 2

## 2024-10-27 SDOH — HEALTH STABILITY: PHYSICAL HEALTH: EXERCISE ACTIVITY: GAIT TRAINING

## 2024-10-27 SDOH — HEALTH STABILITY: PHYSICAL HEALTH: PHYSICAL EXERCISE: 5

## 2024-10-27 SDOH — HEALTH STABILITY: PHYSICAL HEALTH: EXERCISE ACTIVITY: SKILLED THERAPEUTIC EXERCISES

## 2024-10-27 SDOH — HEALTH STABILITY: PHYSICAL HEALTH: EXERCISE ACTIVITY: STS TRANSFER

## 2024-10-27 SDOH — HEALTH STABILITY: PHYSICAL HEALTH: RESISTANCE: AS TOLERATED

## 2024-10-27 SDOH — HEALTH STABILITY: PHYSICAL HEALTH: PHYSICAL EXERCISE: SITTING, STANDING

## 2024-10-27 SDOH — HEALTH STABILITY: PHYSICAL HEALTH: PHYSICAL EXERCISE: 10

## 2024-10-27 SDOH — HEALTH STABILITY: PHYSICAL HEALTH: EXERCISE TYPE: SKILLED THERAPEUTIC EXERCISES

## 2024-10-27 ASSESSMENT — ENCOUNTER SYMPTOMS
DENIES PAIN: 1
PERSON REPORTING PAIN: PATIENT

## 2024-10-27 ASSESSMENT — ACTIVITIES OF DAILY LIVING (ADL)
AMBULATION ASSISTANCE ON FLAT SURFACES: 1
AMBULATION_DISTANCE/DURATION_TOLERATED: 30 FT

## 2024-10-28 ENCOUNTER — APPOINTMENT (OUTPATIENT)
Dept: HOME HEALTH SERVICES | Facility: HOME HEALTH | Age: 59
End: 2024-10-28
Payer: COMMERCIAL

## 2024-10-29 ENCOUNTER — APPOINTMENT (OUTPATIENT)
Dept: PRIMARY CARE | Facility: CLINIC | Age: 59
End: 2024-10-29
Payer: COMMERCIAL

## 2024-10-29 ENCOUNTER — HOME CARE VISIT (OUTPATIENT)
Dept: HOME HEALTH SERVICES | Facility: HOME HEALTH | Age: 59
End: 2024-10-29
Payer: COMMERCIAL

## 2024-10-29 VITALS
DIASTOLIC BLOOD PRESSURE: 84 MMHG | OXYGEN SATURATION: 95 % | TEMPERATURE: 98.4 F | HEART RATE: 90 BPM | RESPIRATION RATE: 18 BRPM | SYSTOLIC BLOOD PRESSURE: 149 MMHG

## 2024-10-29 VITALS — OXYGEN SATURATION: 97 % | HEART RATE: 90 BPM | TEMPERATURE: 98.1 F | RESPIRATION RATE: 14 BRPM

## 2024-10-29 PROCEDURE — G0152 HHCP-SERV OF OT,EA 15 MIN: HCPCS

## 2024-10-29 PROCEDURE — G0151 HHCP-SERV OF PT,EA 15 MIN: HCPCS

## 2024-10-29 ASSESSMENT — ENCOUNTER SYMPTOMS
DENIES PAIN: 1
PERSON REPORTING PAIN: PATIENT

## 2024-10-30 ENCOUNTER — HOME CARE VISIT (OUTPATIENT)
Dept: HOME HEALTH SERVICES | Facility: HOME HEALTH | Age: 59
End: 2024-10-30
Payer: COMMERCIAL

## 2024-10-30 ASSESSMENT — ENCOUNTER SYMPTOMS
DENIES PAIN: 1
PERSON REPORTING PAIN: PATIENT

## 2024-10-31 ENCOUNTER — HOME CARE VISIT (OUTPATIENT)
Dept: HOME HEALTH SERVICES | Facility: HOME HEALTH | Age: 59
End: 2024-10-31
Payer: COMMERCIAL

## 2024-10-31 PROCEDURE — G0151 HHCP-SERV OF PT,EA 15 MIN: HCPCS

## 2024-10-31 PROCEDURE — G0152 HHCP-SERV OF OT,EA 15 MIN: HCPCS

## 2024-10-31 SDOH — HEALTH STABILITY: PHYSICAL HEALTH: EXERCISE ACTIVITIES SETS: 2

## 2024-10-31 SDOH — HEALTH STABILITY: PHYSICAL HEALTH

## 2024-10-31 SDOH — HEALTH STABILITY: PHYSICAL HEALTH: RESISTANCE: AS TOLERATED

## 2024-10-31 SDOH — HEALTH STABILITY: PHYSICAL HEALTH: EXERCISE ACTIVITY: GAIT TRAINING

## 2024-10-31 SDOH — HEALTH STABILITY: PHYSICAL HEALTH: PHYSICAL EXERCISE: 10

## 2024-10-31 SDOH — HEALTH STABILITY: PHYSICAL HEALTH: PHYSICAL EXERCISE: SITTING, STANDING

## 2024-10-31 SDOH — HEALTH STABILITY: PHYSICAL HEALTH: EXERCISE ACTIVITY: STS TRANSFER WITH SUPPORT

## 2024-10-31 SDOH — HEALTH STABILITY: PHYSICAL HEALTH: EXERCISE ACTIVITY: ISOMETRIC GLUTEAL AND ABDOMINAL STRENGTHENING

## 2024-10-31 SDOH — HEALTH STABILITY: PHYSICAL HEALTH: EXERCISE ACTIVITIES SETS: 1

## 2024-10-31 SDOH — HEALTH STABILITY: PHYSICAL HEALTH: EXERCISE TYPE: SKILLED THERAPEUTIC EXERCISES

## 2024-10-31 SDOH — HEALTH STABILITY: PHYSICAL HEALTH: EXERCISE ACTIVITY: SKILLED THERAPEUTIC EXERCISES

## 2024-10-31 SDOH — HEALTH STABILITY: PHYSICAL HEALTH: PHYSICAL EXERCISE: 5

## 2024-10-31 ASSESSMENT — ENCOUNTER SYMPTOMS
PERSON REPORTING PAIN: PATIENT
DENIES PAIN: 1

## 2024-10-31 ASSESSMENT — ACTIVITIES OF DAILY LIVING (ADL)
AMBULATION ASSISTANCE ON FLAT SURFACES: 1
AMBULATION_DISTANCE/DURATION_TOLERATED: 30 FT

## 2024-11-01 ENCOUNTER — HOME CARE VISIT (OUTPATIENT)
Dept: HOME HEALTH SERVICES | Facility: HOME HEALTH | Age: 59
End: 2024-11-01
Payer: COMMERCIAL

## 2024-11-04 ENCOUNTER — APPOINTMENT (OUTPATIENT)
Dept: HOME HEALTH SERVICES | Facility: HOME HEALTH | Age: 59
End: 2024-11-04
Payer: COMMERCIAL

## 2024-11-04 ENCOUNTER — HOME CARE VISIT (OUTPATIENT)
Dept: HOME HEALTH SERVICES | Facility: HOME HEALTH | Age: 59
End: 2024-11-04
Payer: COMMERCIAL

## 2024-11-04 VITALS — TEMPERATURE: 98.5 F | HEART RATE: 84 BPM | OXYGEN SATURATION: 98 %

## 2024-11-04 PROCEDURE — G0152 HHCP-SERV OF OT,EA 15 MIN: HCPCS

## 2024-11-04 ASSESSMENT — ENCOUNTER SYMPTOMS
PERSON REPORTING PAIN: PATIENT
DENIES PAIN: 1

## 2024-11-06 ENCOUNTER — HOME CARE VISIT (OUTPATIENT)
Dept: HOME HEALTH SERVICES | Facility: HOME HEALTH | Age: 59
End: 2024-11-06
Payer: COMMERCIAL

## 2024-11-06 PROCEDURE — G0152 HHCP-SERV OF OT,EA 15 MIN: HCPCS

## 2024-11-06 PROCEDURE — G0151 HHCP-SERV OF PT,EA 15 MIN: HCPCS

## 2024-11-06 ASSESSMENT — ENCOUNTER SYMPTOMS
DENIES PAIN: 1
PERSON REPORTING PAIN: PATIENT

## 2024-11-06 ASSESSMENT — ACTIVITIES OF DAILY LIVING (ADL)
GROOMING ASSESSED: 1
TOILETING: INDEPENDENT
GROOMING_CURRENT_FUNCTION: INDEPENDENT
TOILETING: 1

## 2024-11-07 ENCOUNTER — HOME CARE VISIT (OUTPATIENT)
Dept: HOME HEALTH SERVICES | Facility: HOME HEALTH | Age: 59
End: 2024-11-07
Payer: COMMERCIAL

## 2024-11-07 VITALS
TEMPERATURE: 97.6 F | DIASTOLIC BLOOD PRESSURE: 68 MMHG | SYSTOLIC BLOOD PRESSURE: 104 MMHG | RESPIRATION RATE: 18 BRPM | HEART RATE: 85 BPM

## 2024-11-07 PROCEDURE — G0300 HHS/HOSPICE OF LPN EA 15 MIN: HCPCS

## 2024-11-07 ASSESSMENT — PAIN SCALES - PAIN ASSESSMENT IN ADVANCED DEMENTIA (PAINAD)
NEGVOCALIZATION: 0 - NONE.
CONSOLABILITY: 0 - NO NEED TO CONSOLE.
BODYLANGUAGE: 0 - RELAXED.
FACIALEXPRESSION: 0
NEGVOCALIZATION: 0
FACIALEXPRESSION: 0 - SMILING OR INEXPRESSIVE.
BREATHING: 0
CONSOLABILITY: 0
BODYLANGUAGE: 0
TOTALSCORE: 0

## 2024-11-07 ASSESSMENT — ENCOUNTER SYMPTOMS
LAST BOWEL MOVEMENT: 67150
MUSCLE WEAKNESS: 1
APPETITE LEVEL: GOOD
DENIES PAIN: 1
CHANGE IN APPETITE: UNCHANGED
PERSON REPORTING PAIN: PATIENT

## 2024-11-08 ENCOUNTER — HOME CARE VISIT (OUTPATIENT)
Dept: HOME HEALTH SERVICES | Facility: HOME HEALTH | Age: 59
End: 2024-11-08
Payer: COMMERCIAL

## 2024-11-08 PROCEDURE — G0151 HHCP-SERV OF PT,EA 15 MIN: HCPCS

## 2024-11-10 SDOH — HEALTH STABILITY: PHYSICAL HEALTH: EXERCISE ACTIVITIES SETS: 2

## 2024-11-10 SDOH — HEALTH STABILITY: PHYSICAL HEALTH: PHYSICAL EXERCISE: SITTING, STANDING

## 2024-11-10 SDOH — HEALTH STABILITY: PHYSICAL HEALTH: EXERCISE ACTIVITY: SKILLED THERAPEUTIC EXERCISES

## 2024-11-10 SDOH — HEALTH STABILITY: PHYSICAL HEALTH

## 2024-11-10 SDOH — HEALTH STABILITY: PHYSICAL HEALTH: EXERCISE ACTIVITY: GAIT TRAINING

## 2024-11-10 SDOH — HEALTH STABILITY: PHYSICAL HEALTH: EXERCISE ACTIVITY: STAIR TRAINING

## 2024-11-10 SDOH — HEALTH STABILITY: PHYSICAL HEALTH: PHYSICAL EXERCISE: 10

## 2024-11-10 SDOH — HEALTH STABILITY: PHYSICAL HEALTH: PHYSICAL EXERCISE: 5

## 2024-11-10 SDOH — HEALTH STABILITY: PHYSICAL HEALTH: RESISTANCE: AS TOLERATED

## 2024-11-10 SDOH — HEALTH STABILITY: PHYSICAL HEALTH: EXERCISE TYPE: SKILLED THERAPEUTIC EXERCISES

## 2024-11-10 ASSESSMENT — ACTIVITIES OF DAILY LIVING (ADL)
AMBULATION_DISTANCE/DURATION_TOLERATED: 40 FT
AMBULATION ASSISTANCE ON FLAT SURFACES: 1

## 2024-11-11 ENCOUNTER — HOME CARE VISIT (OUTPATIENT)
Dept: HOME HEALTH SERVICES | Facility: HOME HEALTH | Age: 59
End: 2024-11-11
Payer: COMMERCIAL

## 2024-11-11 ENCOUNTER — APPOINTMENT (OUTPATIENT)
Dept: HOME HEALTH SERVICES | Facility: HOME HEALTH | Age: 59
End: 2024-11-11
Payer: COMMERCIAL

## 2024-11-11 VITALS
DIASTOLIC BLOOD PRESSURE: 64 MMHG | OXYGEN SATURATION: 97 % | TEMPERATURE: 98.1 F | SYSTOLIC BLOOD PRESSURE: 102 MMHG | HEART RATE: 88 BPM

## 2024-11-11 PROCEDURE — G0152 HHCP-SERV OF OT,EA 15 MIN: HCPCS

## 2024-11-11 SDOH — HEALTH STABILITY: PHYSICAL HEALTH: EXERCISE TYPE: SKILLED THERAPEUTIC EXERCISES

## 2024-11-11 SDOH — HEALTH STABILITY: PHYSICAL HEALTH

## 2024-11-11 SDOH — HEALTH STABILITY: PHYSICAL HEALTH: EXERCISE ACTIVITY: GAIT TRAINING

## 2024-11-11 SDOH — HEALTH STABILITY: PHYSICAL HEALTH: EXERCISE ACTIVITIES SETS: 2

## 2024-11-11 SDOH — HEALTH STABILITY: PHYSICAL HEALTH: PHYSICAL EXERCISE: SITTING, STANDING

## 2024-11-11 SDOH — HEALTH STABILITY: PHYSICAL HEALTH: EXERCISE ACTIVITY: SKILLED THERAPEUTIC EXERCISES

## 2024-11-11 SDOH — HEALTH STABILITY: PHYSICAL HEALTH: EXERCISE ACTIVITY: STAIR TRAINING WITH SUPPORT

## 2024-11-11 SDOH — HEALTH STABILITY: PHYSICAL HEALTH: PHYSICAL EXERCISE: 10

## 2024-11-11 SDOH — HEALTH STABILITY: PHYSICAL HEALTH: PHYSICAL EXERCISE: 5

## 2024-11-11 SDOH — HEALTH STABILITY: PHYSICAL HEALTH: RESISTANCE: AS TOLERATED

## 2024-11-11 ASSESSMENT — ENCOUNTER SYMPTOMS
DENIES PAIN: 1
PERSON REPORTING PAIN: PATIENT
PERSON REPORTING PAIN: PATIENT
DENIES PAIN: 1

## 2024-11-11 ASSESSMENT — ACTIVITIES OF DAILY LIVING (ADL)
AMBULATION ASSISTANCE ON FLAT SURFACES: 1
AMBULATION_DISTANCE/DURATION_TOLERATED: 40 FT

## 2024-11-12 ENCOUNTER — HOME CARE VISIT (OUTPATIENT)
Dept: HOME HEALTH SERVICES | Facility: HOME HEALTH | Age: 59
End: 2024-11-12
Payer: COMMERCIAL

## 2024-11-12 VITALS
TEMPERATURE: 97.3 F | OXYGEN SATURATION: 98 % | RESPIRATION RATE: 18 BRPM | SYSTOLIC BLOOD PRESSURE: 140 MMHG | DIASTOLIC BLOOD PRESSURE: 80 MMHG | HEART RATE: 73 BPM

## 2024-11-12 VITALS
TEMPERATURE: 97.3 F | RESPIRATION RATE: 16 BRPM | HEART RATE: 73 BPM | SYSTOLIC BLOOD PRESSURE: 140 MMHG | OXYGEN SATURATION: 98 % | DIASTOLIC BLOOD PRESSURE: 80 MMHG

## 2024-11-12 PROCEDURE — G0299 HHS/HOSPICE OF RN EA 15 MIN: HCPCS

## 2024-11-12 PROCEDURE — G0151 HHCP-SERV OF PT,EA 15 MIN: HCPCS

## 2024-11-12 ASSESSMENT — ENCOUNTER SYMPTOMS
DENIES PAIN: 1
LAST BOWEL MOVEMENT: 67155
APPETITE LEVEL: GOOD

## 2024-11-13 SDOH — HEALTH STABILITY: PHYSICAL HEALTH: RESISTANCE: AS TOLERATED

## 2024-11-13 SDOH — HEALTH STABILITY: PHYSICAL HEALTH

## 2024-11-13 SDOH — HEALTH STABILITY: PHYSICAL HEALTH: PHYSICAL EXERCISE: 5

## 2024-11-13 SDOH — HEALTH STABILITY: PHYSICAL HEALTH: PHYSICAL EXERCISE: 10

## 2024-11-13 SDOH — HEALTH STABILITY: PHYSICAL HEALTH: EXERCISE ACTIVITY: GAIT TRAINING

## 2024-11-13 SDOH — HEALTH STABILITY: PHYSICAL HEALTH: EXERCISE ACTIVITIES SETS: 2

## 2024-11-13 SDOH — HEALTH STABILITY: PHYSICAL HEALTH: PHYSICAL EXERCISE: SITTING, STANDING

## 2024-11-13 SDOH — HEALTH STABILITY: PHYSICAL HEALTH: EXERCISE TYPE: SKILLED THERAPEUTIC EXERCISES

## 2024-11-13 SDOH — HEALTH STABILITY: PHYSICAL HEALTH: EXERCISE ACTIVITY: STAIR TRAINING

## 2024-11-13 SDOH — HEALTH STABILITY: PHYSICAL HEALTH: EXERCISE ACTIVITY: SKILLED THERAPEUTIC EXERCISES

## 2024-11-13 ASSESSMENT — ACTIVITIES OF DAILY LIVING (ADL)
AMBULATION ASSISTANCE ON FLAT SURFACES: 1
AMBULATION_DISTANCE/DURATION_TOLERATED: 30 FT

## 2024-11-13 ASSESSMENT — ENCOUNTER SYMPTOMS
PERSON REPORTING PAIN: PATIENT
DENIES PAIN: 1

## 2024-11-14 ENCOUNTER — HOME CARE VISIT (OUTPATIENT)
Dept: HOME HEALTH SERVICES | Facility: HOME HEALTH | Age: 59
End: 2024-11-14
Payer: COMMERCIAL

## 2024-11-14 PROCEDURE — G0152 HHCP-SERV OF OT,EA 15 MIN: HCPCS

## 2024-11-15 ENCOUNTER — HOME CARE VISIT (OUTPATIENT)
Dept: HOME HEALTH SERVICES | Facility: HOME HEALTH | Age: 59
End: 2024-11-15
Payer: COMMERCIAL

## 2024-11-19 ENCOUNTER — APPOINTMENT (OUTPATIENT)
Dept: PRIMARY CARE | Facility: CLINIC | Age: 59
End: 2024-11-19
Payer: COMMERCIAL

## 2024-11-20 ENCOUNTER — HOME CARE VISIT (OUTPATIENT)
Dept: HOME HEALTH SERVICES | Facility: HOME HEALTH | Age: 59
End: 2024-11-20
Payer: COMMERCIAL

## 2024-11-21 ENCOUNTER — HOME CARE VISIT (OUTPATIENT)
Dept: HOME HEALTH SERVICES | Facility: HOME HEALTH | Age: 59
End: 2024-11-21
Payer: COMMERCIAL

## 2024-11-21 ENCOUNTER — PATIENT MESSAGE (OUTPATIENT)
Dept: PRIMARY CARE | Facility: CLINIC | Age: 59
End: 2024-11-21
Payer: COMMERCIAL

## 2024-11-21 DIAGNOSIS — E78.5 HYPERLIPIDEMIA, UNSPECIFIED HYPERLIPIDEMIA TYPE: ICD-10-CM

## 2024-11-21 DIAGNOSIS — I63.81 LACUNAR INFARCT, ACUTE (MULTI): Primary | ICD-10-CM

## 2024-11-21 DIAGNOSIS — Z76.0 MEDICATION REFILL: ICD-10-CM

## 2024-11-21 DIAGNOSIS — Z79.4 TYPE 2 DIABETES MELLITUS WITH OTHER SPECIFIED COMPLICATION, WITH LONG-TERM CURRENT USE OF INSULIN: ICD-10-CM

## 2024-11-21 DIAGNOSIS — E11.69 TYPE 2 DIABETES MELLITUS WITH OTHER SPECIFIED COMPLICATION, WITH LONG-TERM CURRENT USE OF INSULIN: ICD-10-CM

## 2024-11-21 DIAGNOSIS — I10 PRIMARY HYPERTENSION: ICD-10-CM

## 2024-11-22 ENCOUNTER — HOME CARE VISIT (OUTPATIENT)
Dept: HOME HEALTH SERVICES | Facility: HOME HEALTH | Age: 59
End: 2024-11-22
Payer: COMMERCIAL

## 2024-11-24 ASSESSMENT — ACTIVITIES OF DAILY LIVING (ADL)
OASIS_M1830: 02
HOME_HEALTH_OASIS: 01

## 2024-12-03 ENCOUNTER — APPOINTMENT (OUTPATIENT)
Dept: PRIMARY CARE | Facility: CLINIC | Age: 59
End: 2024-12-03
Payer: COMMERCIAL

## 2024-12-04 RX ORDER — ICOSAPENT ETHYL 1 G/1
2 CAPSULE ORAL 2 TIMES DAILY
Qty: 120 CAPSULE | Refills: 0 | Status: SHIPPED | OUTPATIENT
Start: 2024-12-04

## 2024-12-04 RX ORDER — NAPROXEN SODIUM 220 MG/1
81 TABLET, FILM COATED ORAL DAILY
Qty: 30 TABLET | Refills: 0 | Status: SHIPPED | OUTPATIENT
Start: 2024-12-04

## 2024-12-04 RX ORDER — INSULIN GLARGINE 100 [IU]/ML
33 INJECTION, SOLUTION SUBCUTANEOUS NIGHTLY
Qty: 15 ML | Refills: 1 | Status: SHIPPED | OUTPATIENT
Start: 2024-12-04

## 2024-12-04 RX ORDER — ACETAMINOPHEN 500 MG
5 TABLET ORAL DAILY
Qty: 90 TABLET | Refills: 0 | Status: SHIPPED | OUTPATIENT
Start: 2024-12-04 | End: 2025-03-05

## 2024-12-04 RX ORDER — CARVEDILOL 6.25 MG/1
6.25 TABLET ORAL 2 TIMES DAILY
Qty: 180 TABLET | Refills: 1 | Status: SHIPPED | OUTPATIENT
Start: 2024-12-04 | End: 2025-06-02

## 2024-12-11 DIAGNOSIS — I10 PRIMARY HYPERTENSION: ICD-10-CM

## 2024-12-11 DIAGNOSIS — E78.5 HYPERLIPIDEMIA, UNSPECIFIED HYPERLIPIDEMIA TYPE: ICD-10-CM

## 2024-12-11 DIAGNOSIS — Z76.0 MEDICATION REFILL: ICD-10-CM

## 2024-12-12 PROCEDURE — RXMED WILLOW AMBULATORY MEDICATION CHARGE

## 2024-12-14 ENCOUNTER — PHARMACY VISIT (OUTPATIENT)
Dept: PHARMACY | Facility: CLINIC | Age: 59
End: 2024-12-14
Payer: COMMERCIAL

## 2024-12-16 ENCOUNTER — PHARMACY VISIT (OUTPATIENT)
Dept: PHARMACY | Facility: CLINIC | Age: 59
End: 2024-12-16
Payer: COMMERCIAL

## 2024-12-16 DIAGNOSIS — E78.5 HYPERLIPIDEMIA, UNSPECIFIED HYPERLIPIDEMIA TYPE: ICD-10-CM

## 2024-12-16 DIAGNOSIS — Z76.0 MEDICATION REFILL: ICD-10-CM

## 2024-12-16 RX ORDER — ICOSAPENT ETHYL 1 G/1
2 CAPSULE ORAL 2 TIMES DAILY
Qty: 120 CAPSULE | Refills: 0 | OUTPATIENT
Start: 2024-12-16

## 2024-12-17 ENCOUNTER — TELEPHONE (OUTPATIENT)
Dept: PRIMARY CARE | Facility: CLINIC | Age: 59
End: 2024-12-17
Payer: COMMERCIAL

## 2024-12-17 RX ORDER — CARVEDILOL 6.25 MG/1
TABLET ORAL
Refills: 0 | OUTPATIENT
Start: 2024-12-17

## 2024-12-17 RX ORDER — ICOSAPENT ETHYL 1 G/1
CAPSULE ORAL
Refills: 0 | OUTPATIENT
Start: 2024-12-17

## 2024-12-17 NOTE — TELEPHONE ENCOUNTER
Communication received clarification needed r/t pt preferred pharmacy of dispense. Call placed going directly to voicemail X2 attempts to number [phone]. Voicemail left to return nurse call. Call placed to [phone]. No answer, voicemail full. Calls placed to emergency contact; as well, but still unable to make contact with pt nor emergency contact on file .Message sent to pt over [employer].

## 2024-12-20 ENCOUNTER — DOCUMENTATION (OUTPATIENT)
Dept: PRIMARY CARE | Facility: HOSPITAL | Age: 59
End: 2024-12-20
Payer: COMMERCIAL

## 2024-12-20 NOTE — PROGRESS NOTES
Pt replied to nurse MyChart message. Confirmed desire to utilize [facility], as preferred pharmacy, and to remove [facility], as it is becoming too confusing for the pt. Pt chart updated. Message forwarded to provider.

## 2024-12-27 DIAGNOSIS — I63.9 ACUTE ISCHEMIC STROKE (MULTI): ICD-10-CM

## 2024-12-27 DIAGNOSIS — I10 HYPERTENSION, UNSPECIFIED TYPE: ICD-10-CM

## 2024-12-31 RX ORDER — AMLODIPINE BESYLATE 10 MG/1
10 TABLET ORAL NIGHTLY
Qty: 90 TABLET | Refills: 3 | OUTPATIENT
Start: 2024-12-31

## 2025-01-01 RX ORDER — AMLODIPINE BESYLATE 10 MG/1
10 TABLET ORAL NIGHTLY
Qty: 90 TABLET | Refills: 0 | Status: SHIPPED | OUTPATIENT
Start: 2025-01-01 | End: 2025-04-01

## 2025-01-14 ENCOUNTER — APPOINTMENT (OUTPATIENT)
Dept: NEUROLOGY | Facility: CLINIC | Age: 60
End: 2025-01-14
Payer: COMMERCIAL

## 2025-01-16 DIAGNOSIS — Z76.0 MEDICATION REFILL: ICD-10-CM

## 2025-01-16 DIAGNOSIS — E78.5 HYPERLIPIDEMIA, UNSPECIFIED HYPERLIPIDEMIA TYPE: ICD-10-CM

## 2025-01-16 PROCEDURE — RXMED WILLOW AMBULATORY MEDICATION CHARGE

## 2025-01-16 RX ORDER — ICOSAPENT ETHYL 1 G/1
2 CAPSULE ORAL 2 TIMES DAILY
Qty: 120 CAPSULE | Refills: 0 | Status: SHIPPED | OUTPATIENT
Start: 2025-01-16

## 2025-01-18 ENCOUNTER — PHARMACY VISIT (OUTPATIENT)
Dept: PHARMACY | Facility: CLINIC | Age: 60
End: 2025-01-18
Payer: COMMERCIAL

## 2025-01-20 ENCOUNTER — PHARMACY VISIT (OUTPATIENT)
Dept: PHARMACY | Facility: CLINIC | Age: 60
End: 2025-01-20
Payer: COMMERCIAL

## 2025-01-21 ENCOUNTER — APPOINTMENT (OUTPATIENT)
Dept: PRIMARY CARE | Facility: CLINIC | Age: 60
End: 2025-01-21
Payer: COMMERCIAL

## 2025-01-28 ENCOUNTER — TELEMEDICINE (OUTPATIENT)
Dept: PRIMARY CARE | Facility: CLINIC | Age: 60
End: 2025-01-28
Payer: COMMERCIAL

## 2025-01-28 DIAGNOSIS — E11.69 TYPE 2 DIABETES MELLITUS WITH OTHER SPECIFIED COMPLICATION, WITH LONG-TERM CURRENT USE OF INSULIN: ICD-10-CM

## 2025-01-28 DIAGNOSIS — E78.5 HYPERLIPIDEMIA, UNSPECIFIED HYPERLIPIDEMIA TYPE: ICD-10-CM

## 2025-01-28 DIAGNOSIS — Z79.4 TYPE 2 DIABETES MELLITUS WITH OTHER SPECIFIED COMPLICATION, WITH LONG-TERM CURRENT USE OF INSULIN: ICD-10-CM

## 2025-01-28 DIAGNOSIS — Z76.0 MEDICATION REFILL: ICD-10-CM

## 2025-01-28 DIAGNOSIS — I63.9 ACUTE ISCHEMIC STROKE (MULTI): ICD-10-CM

## 2025-01-28 DIAGNOSIS — I10 HYPERTENSION, UNSPECIFIED TYPE: ICD-10-CM

## 2025-01-28 PROCEDURE — RXMED WILLOW AMBULATORY MEDICATION CHARGE

## 2025-01-28 RX ORDER — ATORVASTATIN CALCIUM 80 MG/1
80 TABLET, FILM COATED ORAL NIGHTLY
Qty: 90 TABLET | Refills: 3 | Status: SHIPPED | OUTPATIENT
Start: 2025-01-28 | End: 2026-01-28

## 2025-01-28 RX ORDER — INSULIN GLARGINE 100 [IU]/ML
25 INJECTION, SOLUTION SUBCUTANEOUS EVERY MORNING
Qty: 15 ML | Refills: 1 | Status: SHIPPED | OUTPATIENT
Start: 2025-01-28

## 2025-01-28 RX ORDER — LISINOPRIL 40 MG/1
40 TABLET ORAL DAILY
Qty: 90 TABLET | Refills: 1 | Status: SHIPPED | OUTPATIENT
Start: 2025-01-28 | End: 2025-07-27

## 2025-01-28 NOTE — PROGRESS NOTES
Subjective   Patient ID: Valentina Fontanez is a 59 y.o. female who presents for Follow-up.  Rehab s/p Stroke  - Been doing rehab at home  - Considering restarting pending insurance coverage  - Follow up with Neurology in April  - Explained to her that she was switched to Vascepa    HTN  - Has not to been checked her BP recently  - Was told it was in the normal range during her last OT visit  - Taking Coreg 6.25 BID, Lisinopril 40 mg, Amlodipine 10 mg, Hydralazine 25 TID   - Lisinopril refill   - Denies any symptoms          Assessment & Plan  Acute ischemic stroke (Multi)  - Continue with rehab exercises at home  -Refill medications as needed to optimize postop management  Orders:    lisinopril 40 mg tablet; Take 1 tablet (40 mg) by mouth once daily.    atorvastatin (Lipitor) 80 mg tablet; Take 1 tablet (80 mg) by mouth once daily at bedtime.    Hypertension, unspecified type  -Continue with current regimen  -Will obtain labs at next visit  Orders:    lisinopril 40 mg tablet; Take 1 tablet (40 mg) by mouth once daily.    Hyperlipidemia, unspecified hyperlipidemia type  -Continue current medications  -Lipitor refill sent to pharmacy  Orders:    atorvastatin (Lipitor) 80 mg tablet; Take 1 tablet (80 mg) by mouth once daily at bedtime.      Warren Velasquez MD

## 2025-01-28 NOTE — ASSESSMENT & PLAN NOTE
- Continue with rehab exercises at home  -Refill medications as needed to optimize postop management  Orders:    lisinopril 40 mg tablet; Take 1 tablet (40 mg) by mouth once daily.    atorvastatin (Lipitor) 80 mg tablet; Take 1 tablet (80 mg) by mouth once daily at bedtime.

## 2025-01-28 NOTE — ASSESSMENT & PLAN NOTE
-Continue current medications  -Lipitor refill sent to pharmacy  Orders:    atorvastatin (Lipitor) 80 mg tablet; Take 1 tablet (80 mg) by mouth once daily at bedtime.

## 2025-01-28 NOTE — ASSESSMENT & PLAN NOTE
-Continue with current regimen  -Will obtain labs at next visit  Orders:    lisinopril 40 mg tablet; Take 1 tablet (40 mg) by mouth once daily.

## 2025-01-31 ENCOUNTER — PHARMACY VISIT (OUTPATIENT)
Dept: PHARMACY | Facility: CLINIC | Age: 60
End: 2025-01-31
Payer: COMMERCIAL

## 2025-02-12 DIAGNOSIS — Z76.0 MEDICATION REFILL: ICD-10-CM

## 2025-02-12 DIAGNOSIS — E78.5 HYPERLIPIDEMIA, UNSPECIFIED HYPERLIPIDEMIA TYPE: ICD-10-CM

## 2025-02-12 PROCEDURE — RXMED WILLOW AMBULATORY MEDICATION CHARGE

## 2025-02-12 RX ORDER — ICOSAPENT ETHYL 1 G/1
2 CAPSULE ORAL 2 TIMES DAILY
Qty: 120 CAPSULE | Refills: 1 | Status: SHIPPED | OUTPATIENT
Start: 2025-02-12

## 2025-02-14 ENCOUNTER — PHARMACY VISIT (OUTPATIENT)
Dept: PHARMACY | Facility: CLINIC | Age: 60
End: 2025-02-14
Payer: COMMERCIAL

## 2025-02-21 PROCEDURE — RXMED WILLOW AMBULATORY MEDICATION CHARGE

## 2025-02-25 ENCOUNTER — PHARMACY VISIT (OUTPATIENT)
Dept: PHARMACY | Facility: CLINIC | Age: 60
End: 2025-02-25
Payer: COMMERCIAL

## 2025-03-03 PROCEDURE — RXMED WILLOW AMBULATORY MEDICATION CHARGE

## 2025-03-07 ENCOUNTER — PHARMACY VISIT (OUTPATIENT)
Dept: PHARMACY | Facility: CLINIC | Age: 60
End: 2025-03-07
Payer: COMMERCIAL

## 2025-03-14 PROCEDURE — RXMED WILLOW AMBULATORY MEDICATION CHARGE

## 2025-03-17 ENCOUNTER — PHARMACY VISIT (OUTPATIENT)
Dept: PHARMACY | Facility: CLINIC | Age: 60
End: 2025-03-17
Payer: COMMERCIAL

## 2025-03-20 ENCOUNTER — PHARMACY VISIT (OUTPATIENT)
Dept: PHARMACY | Facility: CLINIC | Age: 60
End: 2025-03-20
Payer: COMMERCIAL

## 2025-03-20 PROCEDURE — RXMED WILLOW AMBULATORY MEDICATION CHARGE

## 2025-03-24 ENCOUNTER — PHARMACY VISIT (OUTPATIENT)
Dept: PHARMACY | Facility: CLINIC | Age: 60
End: 2025-03-24

## 2025-03-27 DIAGNOSIS — I63.9 ACUTE ISCHEMIC STROKE (MULTI): ICD-10-CM

## 2025-03-27 DIAGNOSIS — I10 HYPERTENSION, UNSPECIFIED TYPE: ICD-10-CM

## 2025-03-27 RX ORDER — AMLODIPINE BESYLATE 10 MG/1
TABLET ORAL
Qty: 90 TABLET | Refills: 3 | Status: SHIPPED | OUTPATIENT
Start: 2025-03-27

## 2025-04-07 ENCOUNTER — APPOINTMENT (OUTPATIENT)
Dept: NEUROLOGY | Facility: CLINIC | Age: 60
End: 2025-04-07
Payer: COMMERCIAL

## 2025-04-13 DIAGNOSIS — Z76.0 MEDICATION REFILL: ICD-10-CM

## 2025-04-13 DIAGNOSIS — E78.5 HYPERLIPIDEMIA, UNSPECIFIED HYPERLIPIDEMIA TYPE: ICD-10-CM

## 2025-04-14 RX ORDER — ICOSAPENT ETHYL 1 G/1
2 CAPSULE ORAL 2 TIMES DAILY
Qty: 120 CAPSULE | Refills: 2 | Status: SHIPPED | OUTPATIENT
Start: 2025-04-14

## 2025-04-16 ENCOUNTER — PATIENT OUTREACH (OUTPATIENT)
Dept: CARE COORDINATION | Age: 60
End: 2025-04-16
Payer: COMMERCIAL

## 2025-04-23 ENCOUNTER — PATIENT OUTREACH (OUTPATIENT)
Dept: CARE COORDINATION | Age: 60
End: 2025-04-23
Payer: COMMERCIAL

## 2025-04-25 ENCOUNTER — PATIENT OUTREACH (OUTPATIENT)
Dept: CARE COORDINATION | Age: 60
End: 2025-04-25
Payer: COMMERCIAL

## 2025-05-24 PROCEDURE — RXMED WILLOW AMBULATORY MEDICATION CHARGE

## 2025-05-28 ENCOUNTER — PHARMACY VISIT (OUTPATIENT)
Dept: PHARMACY | Facility: CLINIC | Age: 60
End: 2025-05-28
Payer: COMMERCIAL

## 2025-06-20 DIAGNOSIS — E78.5 HYPERLIPIDEMIA, UNSPECIFIED HYPERLIPIDEMIA TYPE: ICD-10-CM

## 2025-06-20 DIAGNOSIS — E11.69 TYPE 2 DIABETES MELLITUS WITH OTHER SPECIFIED COMPLICATION, WITH LONG-TERM CURRENT USE OF INSULIN: ICD-10-CM

## 2025-06-20 DIAGNOSIS — Z79.4 TYPE 2 DIABETES MELLITUS WITH OTHER SPECIFIED COMPLICATION, WITH LONG-TERM CURRENT USE OF INSULIN: ICD-10-CM

## 2025-06-20 DIAGNOSIS — Z76.0 MEDICATION REFILL: ICD-10-CM

## 2025-06-20 PROCEDURE — RXMED WILLOW AMBULATORY MEDICATION CHARGE

## 2025-06-23 ENCOUNTER — PHARMACY VISIT (OUTPATIENT)
Dept: PHARMACY | Facility: CLINIC | Age: 60
End: 2025-06-23
Payer: COMMERCIAL

## 2025-06-23 PROCEDURE — RXMED WILLOW AMBULATORY MEDICATION CHARGE

## 2025-06-23 RX ORDER — INSULIN GLARGINE 100 [IU]/ML
25 INJECTION, SOLUTION SUBCUTANEOUS EVERY MORNING
Qty: 15 ML | Refills: 1 | Status: SHIPPED | OUTPATIENT
Start: 2025-06-23

## 2025-06-25 ENCOUNTER — PHARMACY VISIT (OUTPATIENT)
Dept: PHARMACY | Facility: CLINIC | Age: 60
End: 2025-06-25
Payer: COMMERCIAL

## 2025-07-20 PROCEDURE — RXMED WILLOW AMBULATORY MEDICATION CHARGE

## 2025-07-22 ENCOUNTER — PHARMACY VISIT (OUTPATIENT)
Dept: PHARMACY | Facility: CLINIC | Age: 60
End: 2025-07-22
Payer: COMMERCIAL

## 2025-08-19 DIAGNOSIS — E78.5 HYPERLIPIDEMIA, UNSPECIFIED HYPERLIPIDEMIA TYPE: ICD-10-CM

## 2025-08-19 DIAGNOSIS — Z79.4 TYPE 2 DIABETES MELLITUS WITH OTHER SPECIFIED COMPLICATION, WITH LONG-TERM CURRENT USE OF INSULIN: ICD-10-CM

## 2025-08-19 DIAGNOSIS — I63.9 ACUTE ISCHEMIC STROKE (MULTI): ICD-10-CM

## 2025-08-19 DIAGNOSIS — I10 HYPERTENSION, UNSPECIFIED TYPE: ICD-10-CM

## 2025-08-19 DIAGNOSIS — E11.69 TYPE 2 DIABETES MELLITUS WITH OTHER SPECIFIED COMPLICATION, WITH LONG-TERM CURRENT USE OF INSULIN: ICD-10-CM

## 2025-08-19 DIAGNOSIS — Z76.0 MEDICATION REFILL: ICD-10-CM

## 2025-08-21 PROCEDURE — RXMED WILLOW AMBULATORY MEDICATION CHARGE

## 2025-08-21 RX ORDER — LISINOPRIL 40 MG/1
40 TABLET ORAL DAILY
Qty: 90 TABLET | Refills: 1 | Status: SHIPPED | OUTPATIENT
Start: 2025-08-21 | End: 2026-02-17

## 2025-08-21 RX ORDER — INSULIN GLARGINE 100 [IU]/ML
25 INJECTION, SOLUTION SUBCUTANEOUS EVERY MORNING
Qty: 15 ML | Refills: 1 | Status: SHIPPED | OUTPATIENT
Start: 2025-08-21

## 2025-08-23 ENCOUNTER — PHARMACY VISIT (OUTPATIENT)
Dept: PHARMACY | Facility: CLINIC | Age: 60
End: 2025-08-23
Payer: COMMERCIAL

## 2025-08-25 ENCOUNTER — PHARMACY VISIT (OUTPATIENT)
Dept: PHARMACY | Facility: CLINIC | Age: 60
End: 2025-08-25
Payer: COMMERCIAL